# Patient Record
Sex: FEMALE | Race: WHITE | NOT HISPANIC OR LATINO | Employment: OTHER | URBAN - METROPOLITAN AREA
[De-identification: names, ages, dates, MRNs, and addresses within clinical notes are randomized per-mention and may not be internally consistent; named-entity substitution may affect disease eponyms.]

---

## 2017-05-04 LAB
ALBUMIN/CREAT UR: 17 MCG/MG CREAT
BUN SERPL-MCNC: 18 MG/DL (ref 7–25)
BUN/CREAT SERPL: ABNORMAL (CALC) (ref 6–22)
CALCIUM SERPL-MCNC: 10.1 MG/DL (ref 8.6–10.4)
CHLORIDE SERPL-SCNC: 99 MMOL/L (ref 98–110)
CO2 SERPL-SCNC: 30 MMOL/L (ref 20–31)
CREAT SERPL-MCNC: 0.86 MG/DL (ref 0.6–0.93)
CREAT UR-MCNC: 64 MG/DL (ref 20–320)
GLUCOSE SERPL-MCNC: 242 MG/DL (ref 65–99)
HBA1C MFR BLD: 8.6 % OF TOTAL HGB
MICROALBUMIN UR-MCNC: 1.1 MG/DL
POTASSIUM SERPL-SCNC: 4.7 MMOL/L (ref 3.5–5.3)
SL AMB EGFR AFRICAN AMERICAN: 74 ML/MIN/1.73M2
SL AMB EGFR NON AFRICAN AMERICAN: 64 ML/MIN/1.73M2
SODIUM SERPL-SCNC: 137 MMOL/L (ref 135–146)

## 2017-06-30 ENCOUNTER — CONVERSION ENCOUNTER (OUTPATIENT)
Dept: MAMMOGRAPHY | Facility: CLINIC | Age: 82
End: 2017-06-30

## 2018-02-02 LAB
ALBUMIN SERPL-MCNC: 4.2 G/DL (ref 3.6–5.1)
ALBUMIN/CREAT UR: 8 MCG/MG CREAT
ALBUMIN/GLOB SERPL: 1.2 (CALC) (ref 1–2.5)
ALP SERPL-CCNC: 110 U/L (ref 33–130)
ALT SERPL-CCNC: 19 U/L (ref 6–29)
AST SERPL-CCNC: 18 U/L (ref 10–35)
BILIRUB SERPL-MCNC: 0.5 MG/DL (ref 0.2–1.2)
BUN SERPL-MCNC: 20 MG/DL (ref 7–25)
BUN/CREAT SERPL: ABNORMAL (CALC) (ref 6–22)
CALCIUM SERPL-MCNC: 10.9 MG/DL (ref 8.6–10.4)
CHLORIDE SERPL-SCNC: 101 MMOL/L (ref 98–110)
CHOLEST SERPL-MCNC: 157 MG/DL
CHOLEST/HDLC SERPL: 2.5 (CALC)
CO2 SERPL-SCNC: 29 MMOL/L (ref 20–31)
CREAT SERPL-MCNC: 0.82 MG/DL (ref 0.6–0.88)
CREAT UR-MCNC: 77 MG/DL (ref 20–320)
GLOBULIN SER CALC-MCNC: 3.4 G/DL (CALC) (ref 1.9–3.7)
GLUCOSE SERPL-MCNC: 119 MG/DL (ref 65–99)
HBA1C MFR BLD: 7.8 % OF TOTAL HGB
HDLC SERPL-MCNC: 62 MG/DL
LDLC SERPL CALC-MCNC: 81 MG/DL (CALC)
MICROALBUMIN UR-MCNC: 0.6 MG/DL
NONHDLC SERPL-MCNC: 95 MG/DL (CALC)
POTASSIUM SERPL-SCNC: 3.8 MMOL/L (ref 3.5–5.3)
PROT SERPL-MCNC: 7.6 G/DL (ref 6.1–8.1)
SL AMB EGFR AFRICAN AMERICAN: 78 ML/MIN/1.73M2
SL AMB EGFR NON AFRICAN AMERICAN: 68 ML/MIN/1.73M2
SODIUM SERPL-SCNC: 141 MMOL/L (ref 135–146)
TRIGL SERPL-MCNC: 63 MG/DL

## 2018-03-09 LAB
25(OH)D3 SERPL-MCNC: 56 NG/ML (ref 30–100)
HBA1C MFR BLD: 7.5 % OF TOTAL HGB
PTH-INTACT SERPL-MCNC: NORMAL PG/ML

## 2018-03-15 LAB
CALCIUM SERPL-MCNC: 10.2 MG/DL (ref 8.6–10.4)
PTH-INTACT SERPL-MCNC: 82 PG/ML (ref 14–64)

## 2018-05-21 LAB
CALCIUM SERPL-MCNC: 9.7 MG/DL (ref 8.4–10.2)
GLUCOSE SERPL-MCNC: 179 MG/DL (ref 70–99)
GLUCOSE SERPL-MCNC: 185 MG/DL (ref 70–99)
GLUCOSE SERPL-MCNC: 230 MG/DL (ref 70–99)
GLUCOSE SERPL-MCNC: 233 MG/DL (ref 70–99)
PTH-INTACT SERPL-MCNC: 102 PG/ML (ref 16.7–78.9)
PTH-INTACT SERPL-MCNC: 40.5 PG/ML (ref 16.7–78.9)

## 2018-05-22 LAB
CALCIUM SERPL-MCNC: 9 MG/DL (ref 8.4–10.2)
GLUCOSE SERPL-MCNC: 170 MG/DL (ref 70–99)
GLUCOSE SERPL-MCNC: 206 MG/DL (ref 70–99)

## 2018-05-28 LAB
ABSOL LYMPHOCYTES (HISTORICAL): 1.9 K/UL (ref 0.5–4)
ALBUMIN SERPL BCP-MCNC: 3.8 G/DL (ref 3–5.2)
ALP SERPL-CCNC: 120 U/L (ref 43–122)
ALT SERPL W P-5'-P-CCNC: 35 U/L (ref 9–52)
ANION GAP SERPL CALCULATED.3IONS-SCNC: 12 MMOL/L (ref 5–14)
AST SERPL W P-5'-P-CCNC: 23 U/L (ref 14–36)
BASOPHILS # BLD AUTO: 0.1 K/UL (ref 0–0.1)
BASOPHILS # BLD AUTO: 1 % (ref 0–1)
BILIRUB SERPL-MCNC: 0.4 MG/DL
BUN SERPL-MCNC: 23 MG/DL (ref 5–25)
CALCIUM SERPL-MCNC: 9.4 MG/DL (ref 8.4–10.2)
CALLED AND READ BACK BY. (HISTORICAL): NORMAL
CALLED AND READ BACK BY. (HISTORICAL): NORMAL
CHLORIDE SERPL-SCNC: 101 MEQ/L (ref 97–108)
CK SERPL-CCNC: 39 U/L (ref 30–135)
CK SERPL-CCNC: 45 U/L (ref 30–135)
CO2 SERPL-SCNC: 25 MMOL/L (ref 22–30)
CREATINE KINASE-MB FRACTION (HISTORICAL): 0.9 NG/ML (ref 0–2.37)
CREATINE KINASE-MB FRACTION (HISTORICAL): 1.21 NG/ML (ref 0–2.37)
CREATINE, SERUM (HISTORICAL): 0.84 MG/DL (ref 0.6–1.2)
DEPRECATED RDW RBC AUTO: 12.8 %
EGFR (HISTORICAL): >60 ML/MIN/1.73 M2
EOSINOPHIL # BLD AUTO: 0.2 K/UL (ref 0–0.4)
EOSINOPHIL NFR BLD AUTO: 3 % (ref 0–6)
GLUCOSE SERPL-MCNC: 128 MG/DL (ref 70–99)
GLUCOSE SERPL-MCNC: 191 MG/DL (ref 70–99)
GLUCOSE SERPL-MCNC: 217 MG/DL (ref 70–99)
GLUCOSE SERPL-MCNC: 281 MG/DL (ref 70–99)
GLUCOSE SERPL-MCNC: 287 MG/DL (ref 70–99)
HCT VFR BLD AUTO: 38.3 % (ref 36–46)
HGB BLD-MCNC: 13.1 G/DL (ref 12–16)
LYMPHOCYTES NFR BLD AUTO: 21 % (ref 25–45)
MAGNESIUM SERPL-MCNC: 2 MG/DL (ref 1.6–2.3)
MCH RBC QN AUTO: 31 PG (ref 26–34)
MCHC RBC AUTO-ENTMCNC: 34.2 % (ref 31–36)
MCV RBC AUTO: 91 FL (ref 80–100)
MONOCYTES # BLD AUTO: 0.7 K/UL (ref 0.2–0.9)
MONOCYTES NFR BLD AUTO: 8 % (ref 1–10)
NEUTROPHILS ABS COUNT (HISTORICAL): 6.3 K/UL (ref 1.8–7.8)
NEUTS SEG NFR BLD AUTO: 67 % (ref 45–65)
PLATELET # BLD AUTO: 232 K/MCL (ref 150–450)
POTASSIUM SERPL-SCNC: 3.8 MEQ/L (ref 3.6–5)
RBC # BLD AUTO: 4.23 M/MCL (ref 4–5.2)
SODIUM SERPL-SCNC: 138 MEQ/L (ref 137–147)
TOTAL PROTEIN (HISTORICAL): 7.1 G/DL (ref 5.9–8.4)
TROPONIN I SERPL-MCNC: 0.08 NG/ML (ref 0–0.03)
TROPONIN I SERPL-MCNC: 0.12 NG/ML (ref 0–0.03)
TROPONIN I SERPL-MCNC: 0.16 NG/ML (ref 0–0.03)
TROPONIN I SERPL-MCNC: 0.38 NG/ML (ref 0–0.03)
TSH SERPL DL<=0.05 MIU/L-ACNC: 1.92 UIU/ML (ref 0.47–4.68)
WBC # BLD AUTO: 9.3 K/MCL (ref 4.5–11)

## 2018-05-29 LAB
ABSOL LYMPHOCYTES (HISTORICAL): 2.2 K/UL (ref 0.5–4)
ANION GAP SERPL CALCULATED.3IONS-SCNC: 7 MMOL/L (ref 5–14)
BASOPHILS # BLD AUTO: 0.1 K/UL (ref 0–0.1)
BASOPHILS # BLD AUTO: 1 % (ref 0–1)
BUN SERPL-MCNC: 19 MG/DL (ref 5–25)
CALCIUM SERPL-MCNC: 9.2 MG/DL (ref 8.4–10.2)
CHLORIDE SERPL-SCNC: 99 MEQ/L (ref 97–108)
CO2 SERPL-SCNC: 30 MMOL/L (ref 22–30)
CREATINE, SERUM (HISTORICAL): 0.87 MG/DL (ref 0.6–1.2)
DEPRECATED RDW RBC AUTO: 13.1 %
EGFR (HISTORICAL): >60 ML/MIN/1.73 M2
EOSINOPHIL # BLD AUTO: 0.3 K/UL (ref 0–0.4)
EOSINOPHIL NFR BLD AUTO: 4 % (ref 0–6)
GLUCOSE SERPL-MCNC: 103 MG/DL (ref 70–99)
GLUCOSE SERPL-MCNC: 104 MG/DL (ref 70–99)
GLUCOSE SERPL-MCNC: 113 MG/DL (ref 70–99)
GLUCOSE SERPL-MCNC: 244 MG/DL (ref 70–99)
GLUCOSE SERPL-MCNC: 270 MG/DL (ref 70–99)
HCT VFR BLD AUTO: 38.4 % (ref 36–46)
HGB BLD-MCNC: 12.8 G/DL (ref 12–16)
LYMPHOCYTES NFR BLD AUTO: 24 % (ref 25–45)
MCH RBC QN AUTO: 30.1 PG (ref 26–34)
MCHC RBC AUTO-ENTMCNC: 33.5 % (ref 31–36)
MCV RBC AUTO: 90 FL (ref 80–100)
MONOCYTES # BLD AUTO: 0.8 K/UL (ref 0.2–0.9)
MONOCYTES NFR BLD AUTO: 8 % (ref 1–10)
NEUTROPHILS ABS COUNT (HISTORICAL): 5.8 K/UL (ref 1.8–7.8)
NEUTS SEG NFR BLD AUTO: 63 % (ref 45–65)
PLATELET # BLD AUTO: 259 K/MCL (ref 150–450)
POTASSIUM SERPL-SCNC: 3.7 MEQ/L (ref 3.6–5)
RBC # BLD AUTO: 4.27 M/MCL (ref 4–5.2)
SODIUM SERPL-SCNC: 137 MEQ/L (ref 137–147)
TROPONIN I SERPL-MCNC: 0.07 NG/ML (ref 0–0.03)
WBC # BLD AUTO: 9.2 K/MCL (ref 4.5–11)

## 2018-05-30 LAB
GLUCOSE SERPL-MCNC: 242 MG/DL (ref 70–99)
GLUCOSE SERPL-MCNC: 86 MG/DL (ref 70–99)

## 2018-07-12 ENCOUNTER — TELEPHONE (OUTPATIENT)
Dept: FAMILY MEDICINE CLINIC | Facility: CLINIC | Age: 83
End: 2018-07-12

## 2018-07-12 DIAGNOSIS — E78.49 OTHER HYPERLIPIDEMIA: Primary | ICD-10-CM

## 2018-07-12 RX ORDER — SIMVASTATIN 20 MG
20 TABLET ORAL DAILY
Qty: 90 TABLET | Refills: 3 | Status: SHIPPED | OUTPATIENT
Start: 2018-07-12 | End: 2018-12-17 | Stop reason: SDUPTHER

## 2018-07-12 RX ORDER — SIMVASTATIN 20 MG
1 TABLET ORAL DAILY
COMMUNITY
Start: 2017-04-03 | End: 2018-07-12 | Stop reason: SDUPTHER

## 2018-07-12 NOTE — TELEPHONE ENCOUNTER
Pt needs a refill on her Simvastatin 20 mg 1 tablet daily sent into the pharmacy listed in chart  Pt sees Dr Juan Horne

## 2018-07-23 DIAGNOSIS — I10 HYPERTENSION, UNSPECIFIED TYPE: Primary | ICD-10-CM

## 2018-07-23 RX ORDER — DILTIAZEM HYDROCHLORIDE 120 MG/1
120 CAPSULE, COATED, EXTENDED RELEASE ORAL DAILY
Qty: 30 CAPSULE | Refills: 5 | Status: SHIPPED | OUTPATIENT
Start: 2018-07-23 | End: 2019-01-14 | Stop reason: SDUPTHER

## 2018-07-24 DIAGNOSIS — E55.9 VITAMIN D DEFICIENCY, UNSPECIFIED: Primary | ICD-10-CM

## 2018-07-24 RX ORDER — B-COMPLEX WITH VITAMIN C
1 TABLET ORAL 2 TIMES DAILY
Refills: 1 | COMMUNITY
Start: 2018-05-24 | End: 2018-07-24 | Stop reason: SDUPTHER

## 2018-07-24 RX ORDER — B-COMPLEX WITH VITAMIN C
1 TABLET ORAL
Qty: 200 TABLET | Refills: 0 | Status: SHIPPED | OUTPATIENT
Start: 2018-07-24 | End: 2018-12-17 | Stop reason: SDUPTHER

## 2018-08-15 ENCOUNTER — CLINICAL SUPPORT (OUTPATIENT)
Dept: FAMILY MEDICINE CLINIC | Facility: CLINIC | Age: 83
End: 2018-08-15
Payer: COMMERCIAL

## 2018-08-15 DIAGNOSIS — R73.09 ABNORMAL GLUCOSE: Primary | ICD-10-CM

## 2018-08-15 PROCEDURE — 36415 COLL VENOUS BLD VENIPUNCTURE: CPT

## 2018-08-16 LAB
EST. AVERAGE GLUCOSE BLD GHB EST-MCNC: 189 (CALC)
EST. AVERAGE GLUCOSE BLD GHB EST-SCNC: 10.4 (CALC)
HBA1C MFR BLD: 8.2 % OF TOTAL HGB

## 2018-08-22 ENCOUNTER — OFFICE VISIT (OUTPATIENT)
Dept: FAMILY MEDICINE CLINIC | Facility: CLINIC | Age: 83
End: 2018-08-22
Payer: COMMERCIAL

## 2018-08-22 VITALS
BODY MASS INDEX: 28.19 KG/M2 | SYSTOLIC BLOOD PRESSURE: 150 MMHG | OXYGEN SATURATION: 95 % | RESPIRATION RATE: 18 BRPM | WEIGHT: 153.2 LBS | HEART RATE: 85 BPM | DIASTOLIC BLOOD PRESSURE: 92 MMHG | TEMPERATURE: 97.2 F | HEIGHT: 62 IN

## 2018-08-22 DIAGNOSIS — E04.1 THYROID NODULE: ICD-10-CM

## 2018-08-22 DIAGNOSIS — I48.0 PAROXYSMAL ATRIAL FIBRILLATION (HCC): ICD-10-CM

## 2018-08-22 DIAGNOSIS — E11.22 CONTROLLED TYPE 2 DIABETES MELLITUS WITH STAGE 1 CHRONIC KIDNEY DISEASE, WITHOUT LONG-TERM CURRENT USE OF INSULIN (HCC): ICD-10-CM

## 2018-08-22 DIAGNOSIS — E11.9 TYPE 2 DIABETES MELLITUS WITHOUT COMPLICATION, UNSPECIFIED WHETHER LONG TERM INSULIN USE (HCC): Primary | ICD-10-CM

## 2018-08-22 DIAGNOSIS — E21.3 HYPERPARATHYROIDISM (HCC): ICD-10-CM

## 2018-08-22 DIAGNOSIS — I72.8 SPLENIC ARTERY ANEURYSM (HCC): ICD-10-CM

## 2018-08-22 DIAGNOSIS — N18.1 CONTROLLED TYPE 2 DIABETES MELLITUS WITH STAGE 1 CHRONIC KIDNEY DISEASE, WITHOUT LONG-TERM CURRENT USE OF INSULIN (HCC): ICD-10-CM

## 2018-08-22 DIAGNOSIS — E78.2 MIXED HYPERLIPIDEMIA: ICD-10-CM

## 2018-08-22 DIAGNOSIS — I10 BENIGN ESSENTIAL HYPERTENSION: Primary | ICD-10-CM

## 2018-08-22 PROBLEM — M54.17 LUMBOSACRAL RADICULOPATHY: Status: ACTIVE | Noted: 2017-04-10

## 2018-08-22 PROBLEM — I72.2 ANEURYSM OF RENAL ARTERY (HCC): Status: RESOLVED | Noted: 2017-04-17 | Resolved: 2018-08-22

## 2018-08-22 PROBLEM — I72.2 ANEURYSM OF RENAL ARTERY (HCC): Status: ACTIVE | Noted: 2017-04-17

## 2018-08-22 PROCEDURE — 99214 OFFICE O/P EST MOD 30 MIN: CPT | Performed by: FAMILY MEDICINE

## 2018-08-22 RX ORDER — UREA 10 %
1 LOTION (ML) TOPICAL DAILY
COMMUNITY
Start: 2018-05-31 | End: 2018-08-22 | Stop reason: SDUPTHER

## 2018-08-22 RX ORDER — FUROSEMIDE 20 MG/1
1 TABLET ORAL DAILY
Refills: 0 | COMMUNITY
Start: 2018-05-24 | End: 2018-12-27 | Stop reason: SDUPTHER

## 2018-08-22 RX ORDER — HYDROCHLOROTHIAZIDE 25 MG/1
0.5 TABLET ORAL DAILY
COMMUNITY
Start: 2018-02-06 | End: 2018-08-22 | Stop reason: ALTCHOICE

## 2018-08-22 RX ORDER — AMLODIPINE BESYLATE 5 MG/1
5 TABLET ORAL DAILY
COMMUNITY
End: 2018-08-22 | Stop reason: ALTCHOICE

## 2018-08-22 RX ORDER — REPAGLINIDE 0.5 MG/1
0.5 TABLET ORAL 3 TIMES DAILY
Qty: 270 TABLET | Refills: 3 | Status: SHIPPED | OUTPATIENT
Start: 2018-08-22 | End: 2018-12-17 | Stop reason: DRUGHIGH

## 2018-08-22 RX ORDER — LISINOPRIL 40 MG/1
1 TABLET ORAL DAILY
COMMUNITY
Start: 2018-02-06 | End: 2018-08-22 | Stop reason: ALTCHOICE

## 2018-08-22 RX ORDER — ATENOLOL 50 MG/1
1 TABLET ORAL EVERY 12 HOURS
COMMUNITY
Start: 2018-03-06 | End: 2018-12-17 | Stop reason: SDUPTHER

## 2018-08-22 RX ORDER — REPAGLINIDE 0.5 MG/1
1 TABLET ORAL 3 TIMES DAILY
COMMUNITY
Start: 2018-06-08 | End: 2018-08-22 | Stop reason: SDUPTHER

## 2018-08-22 RX ORDER — ELECTROLYTES/DEXTROSE
1 SOLUTION, ORAL ORAL EVERY 24 HOURS
COMMUNITY
Start: 2010-10-04 | End: 2018-09-24 | Stop reason: ALTCHOICE

## 2018-08-22 RX ORDER — DILTIAZEM HYDROCHLORIDE 120 MG/1
1 CAPSULE, COATED, EXTENDED RELEASE ORAL EVERY 24 HOURS
COMMUNITY
Start: 2018-06-22 | End: 2018-08-22 | Stop reason: SDUPTHER

## 2018-08-22 RX ORDER — CHOLECALCIFEROL (VITAMIN D3) 50 MCG
1 TABLET ORAL DAILY
COMMUNITY
Start: 2014-09-25 | End: 2019-02-08 | Stop reason: ALTCHOICE

## 2018-08-22 NOTE — ASSESSMENT & PLAN NOTE
Lab Results   Component Value Date    HGBA1C 8 2 (H) 08/15/2018   Hospital labs reviewed from 5/2018  No hypoglycemia  Had 2 hospitalizations which messed with her BS  No change in meds  We discuuseed today  She tries to eat healthy and exercises regularly  No change in meds and follow the HbA1c  Blood Sugar Average: Last 72 hrs: JML=[id number]  RADHA= 175-200

## 2018-08-22 NOTE — ASSESSMENT & PLAN NOTE
Has been in NSR since the hospitalization  Has had no change in meds  No palpitations  Goes to exercise 2-3x/wk and does resistence and Cardio for an hour  Reviewed labs from 5/23/18  No change in meds

## 2018-08-22 NOTE — PROGRESS NOTES
Assessment/Plan:    Diabetes mellitus type 2, controlled ([facility] )  Lab Results   Component Value Date    HGBA1C 8 2 (H) 08/15/2018   Hospital labs reviewed from 5/2018  No hypoglycemia  Had 2 hospitalizations which messed with her BS  No change in meds  We discuuseed today  She tries to eat healthy and exercises regularly  No change in meds and follow the HbA1c  Blood Sugar Average: Last 72 hrs: ISQ=[id number]  RADHA= 175-200  Paroxysmal atrial fibrillation (HCC)  Has been in NSR since the hospitalization  Has had no change in meds  No palpitations  Goes to exercise 2-3x/wk and does resistence and Cardio for an hour  Reviewed labs from 5/23/18  No change in meds  Benign essential hypertension  HBPM - SBP= 120s and 130s and the DBP = 70s  Exercises regularly and I reviewed labs from 5/2018  Mixed hyperlipidemia  Doing well on simvistatin  No change in present meds  Hyperparathyroidism ([place] [address] )  On calcium replacement once a day, post parathroid surgery for removal of active adenoma  Diagnoses and all orders for this visit:    Benign essential hypertension    Paroxysmal atrial fibrillation ([place] [address] )    Controlled type 2 diabetes mellitus with stage 1 chronic kidney disease, without long-term current use of insulin ([facility])  -     Hemoglobin A1C; Future  -     Basic metabolic panel; Future  -     Lipid Panel with Direct LDL reflex; Future    Hyperparathyroidism ([place] [address] )    Splenic artery aneurysm (HCC)    Mixed hyperlipidemia    Thyroid nodule  -     US head neck soft tissue; Future    Other orders  -     Discontinue: amLODIPine (NORVASC) 5 mg tablet; Take 5 mg by mouth daily  -     Discontinue: aspirin 81 MG tablet; Take 1 tablet by mouth daily  -     atenolol (TENORMIN) 50 mg tablet; Take 1 tablet by mouth Every 12 hours  -     Insulin Pen Needle (BD ULTRA-FINE MICRO PEN NEEDLE) 32G X 6 MM MISC; daily  -     Discontinue: calcium carbonate (OS-AMBER) 1250 (500 Ca) MG chewable tablet;  Chew 1 tablet daily  - Cholecalciferol (VITAMIN D) 2000 units tablet; Take 1 tablet by mouth daily  -     Discontinue: diltiazem (DILTIAZEM CD) 120 mg 24 hr capsule; Take 1 tablet by mouth every 24 hours  -     furosemide (LASIX) 20 mg tablet; Take 1 tablet by mouth daily  -     Discontinue: hydrochlorothiazide (HYDRODIURIL) 25 mg tablet; Take 0 5 tablets by mouth daily  -     insulin detemir (LEVEMIR FLEXTOUCH) 100 Units/mL injection pen; Inject 12 Units under the skin daily  -     Discontinue: lisinopril (ZESTRIL) 40 mg tablet; Take 1 tablet by mouth daily  -     Multiple Vitamins-Minerals (MULTIVITAMIN ADULT) TABS; Take 1 tablet by mouth every 24 hours  -     repaglinide (PRANDIN) 0 5 mg tablet; Take 1 tablet by mouth 3 (three) times a day  -     rivaroxaban (XARELTO) 20 mg tablet; Take 1 tablet by mouth every 24 hours          Subjective:     Chief Complaint   Patient presents with    Hypertension        Patient ID: Nba Duvall is a 82 y o  female  HPI    The following portions of the patient's history were reviewed and updated as appropriate: allergies, current medications, past family history, past medical history, past social history, past surgical history and problem list     Review of Systems   Constitutional: Negative for activity change, appetite change, fatigue, fever and unexpected weight change  HENT: Negative for congestion, dental problem and sneezing  Eyes: Negative for discharge and visual disturbance  Respiratory: Negative for cough and wheezing  Gastrointestinal: Negative for abdominal pain, constipation, diarrhea, nausea and vomiting  Endocrine: Negative for polydipsia and polyuria  Genitourinary: Negative for dysuria and frequency  Musculoskeletal: Negative for arthralgias  Skin: Negative for rash  Allergic/Immunologic: Negative for environmental allergies and food allergies  Neurological: Negative for headaches  Hematological: Negative for adenopathy     Psychiatric/Behavioral: Negative for behavioral problems and sleep disturbance  Objective:  Vitals:    08/22/18 1306 08/22/18 1335   BP: (!) 198/102 150/92   BP Location: Left arm    Patient Position: Sitting    Cuff Size: Standard    Pulse: 85    Resp: 18    Temp: (!) 97 2 °F (36 2 °C)    TempSrc: Temporal    SpO2: 95%    Weight: 69 5 kg (153 lb 3 2 oz)    Height: 5' 2" (1 575 m)       Physical Exam   Constitutional: She is oriented to person, place, and time  She appears well-developed and well-nourished  HENT:   Head: Normocephalic  Right Ear: External ear normal    Left Ear: External ear normal    Nose: Nose normal    Eyes: Conjunctivae are normal  Pupils are equal, round, and reactive to light  Right eye exhibits no discharge  Left eye exhibits no discharge  Neck: Normal range of motion  Neck supple  No thyromegaly present  Cardiovascular: Normal rate, regular rhythm and normal heart sounds  No murmur heard  Pulmonary/Chest: Effort normal and breath sounds normal    Abdominal: Soft  Bowel sounds are normal  There is no tenderness  Musculoskeletal: Normal range of motion  Lymphadenopathy:     She has no cervical adenopathy  Neurological: She is alert and oriented to person, place, and time  Skin: Skin is warm  No rash noted  Psychiatric: She has a normal mood and affect   Her behavior is normal

## 2018-08-27 ENCOUNTER — TRANSCRIBE ORDERS (OUTPATIENT)
Dept: ADMINISTRATIVE | Facility: HOSPITAL | Age: 83
End: 2018-08-27

## 2018-08-27 DIAGNOSIS — Z12.39 SCREENING BREAST EXAMINATION: Primary | ICD-10-CM

## 2018-09-12 ENCOUNTER — TELEPHONE (OUTPATIENT)
Dept: FAMILY MEDICINE CLINIC | Facility: CLINIC | Age: 83
End: 2018-09-12

## 2018-09-12 DIAGNOSIS — Z12.31 ENCOUNTER FOR SCREENING MAMMOGRAM FOR BREAST CANCER: Primary | ICD-10-CM

## 2018-09-18 ENCOUNTER — HOSPITAL ENCOUNTER (OUTPATIENT)
Dept: MAMMOGRAPHY | Facility: CLINIC | Age: 83
Discharge: HOME | End: 2018-09-18
Payer: COMMERCIAL

## 2018-09-18 DIAGNOSIS — Z12.39 SCREENING BREAST EXAMINATION: ICD-10-CM

## 2018-09-18 PROCEDURE — 77067 SCR MAMMO BI INCL CAD: CPT

## 2018-09-24 ENCOUNTER — OFFICE VISIT (OUTPATIENT)
Dept: CARDIOLOGY CLINIC | Facility: CLINIC | Age: 83
End: 2018-09-24
Payer: COMMERCIAL

## 2018-09-24 VITALS
BODY MASS INDEX: 28.01 KG/M2 | SYSTOLIC BLOOD PRESSURE: 146 MMHG | DIASTOLIC BLOOD PRESSURE: 90 MMHG | OXYGEN SATURATION: 95 % | HEART RATE: 63 BPM | HEIGHT: 62 IN | WEIGHT: 152.2 LBS

## 2018-09-24 DIAGNOSIS — E78.2 MIXED HYPERLIPIDEMIA: ICD-10-CM

## 2018-09-24 DIAGNOSIS — I48.0 PAROXYSMAL ATRIAL FIBRILLATION (HCC): ICD-10-CM

## 2018-09-24 DIAGNOSIS — I10 BENIGN ESSENTIAL HYPERTENSION: Primary | ICD-10-CM

## 2018-09-24 PROCEDURE — 99214 OFFICE O/P EST MOD 30 MIN: CPT | Performed by: INTERNAL MEDICINE

## 2018-09-24 PROCEDURE — 93000 ELECTROCARDIOGRAM COMPLETE: CPT | Performed by: INTERNAL MEDICINE

## 2018-09-24 NOTE — PROGRESS NOTES
Cardiology Follow Up    Adrienne Valencia  1935  [id number]  [address] [facility] [facility]  [address] [address] [phone]    1  Benign essential hypertension  POCT ECG   2  Paroxysmal atrial fibrillation (HCC)     3  Mixed hyperlipidemia         Patient was 1st seen by us for new onset atrial fibrillation with RVR postop parathyroidectomy by Dr Santi Houston  She is a 2nd admission for the same and at that time had a type 2 non ST elevation myocardial infarction  CT scan PE study was negative  Past medical history includes hypertension, hyperlipidemia and diabetes mellitus  05/29/2018 stress test: Negative Persantine stress test  LVEF 77%, hyperdynamic ventricle  Normal tomographic perfusion series  05/23/2018 echocardiogram: Normal left ventricular systolic function, EF 81-53%  Intermediate left ventricular diastolic function  Normal left ventricular filling pressures  Mild-to-moderate left atrial enlargement  Aortic sclerosis without stenosis  05/24/2018 FLP: Cholesterol 128, triglycerides 52, HDL 51, LDL calculated 59  Interval History:   Patient with a history of paroxysmal atrial fibrillation returns  She denies palpitations or being aware of an irregular heartbeat  Her EKG demonstrates sinus rhythm  She denies chest discomfort or shortness of breath      Patient Active Problem List   Diagnosis    Benign essential hypertension    Female bladder prolapse    Fibrocystic breast disease    Hypercalcemia    Incontinence    Kyphosis    Microproteinuria    Lumbosacral radiculopathy    Ventricular premature depolarization    Tinnitus    Paroxysmal atrial fibrillation (HCC)    Mixed hyperlipidemia    Diabetes mellitus type 2, controlled ([address] )    Hyperparathyroidism ([address] )    Splenic artery aneurysm (HCC)    Thyroid nodule     Past Medical History:   Diagnosis Date    Anemia IRON DEF ANEMIA DUE TO MENORRHAGIA    Hypercalcemia 10/06/2010    MILD  DECREASES HCTZ    Melanoma ([address] ) 11/2011    IN SITU  MID BACK    Palpitations 05/28/2018    SINUS TACHYCARDIA  TROPONIN SPLL    Skin cancer 2017    NOSE     Social History     Social History    Marital status: /Civil Union     Spouse name: N/A    Number of children: N/A    Years of education: N/A     Occupational History    Not on file       Social History Main Topics    Smoking status: Never Smoker    Smokeless tobacco: Never Used    Alcohol use No    Drug use: No    Sexual activity: Not on file     Other Topics Concern    Not on file     Social History Narrative    No narrative on file      Family History   Problem Relation Age of Onset    Other Mother         TESTED(-)    Colon cancer Father     Prostate cancer Father     Factor V Leiden deficiency Daughter     Factor V Leiden deficiency Daughter      Past Surgical History:   Procedure Laterality Date    HYSTERECTOMY  1995    UTERINE PROLAPSE VAGINAL POLYPS    HYSTERECTOMY      IRON REPLACEMENT    OTHER SURGICAL HISTORY  11/2011    EXCISION MELANOMA IN SITU MID BACK    PARATHYROIDECTOMY  05/21/2018       Current Outpatient Prescriptions:     atenolol (TENORMIN) 50 mg tablet, Take 1 tablet by mouth Every 12 hours, Disp: , Rfl:     calcium carbonate-vitamin D (OSCAL-D) 500 mg-200 units per tablet, Take 1 tablet by mouth daily with breakfast, Disp: 200 tablet, Rfl: 0    Cholecalciferol (VITAMIN D) 2000 units tablet, Take 1 tablet by mouth daily, Disp: , Rfl:     diltiazem (CARDIZEM CD) 120 mg 24 hr capsule, Take 1 capsule (120 mg total) by mouth daily, Disp: 30 capsule, Rfl: 5    furosemide (LASIX) 20 mg tablet, Take 1 tablet by mouth daily, Disp: , Rfl: 0    insulin detemir (LEVEMIR FLEXTOUCH) 100 Units/mL injection pen, Inject 12 Units under the skin daily, Disp: , Rfl:     repaglinide (PRANDIN) 0 5 mg tablet, Take 1 tablet (0 5 mg total) by mouth 3 (three) times a day, Disp: 270 tablet, Rfl: 3    rivaroxaban (XARELTO) 20 mg tablet, Take 1 tablet by mouth every 24 hours, Disp: , Rfl:     simvastatin (ZOCOR) 20 mg tablet, Take 1 tablet (20 mg total) by mouth daily, Disp: 90 tablet, Rfl: 3  Allergies   Allergen Reactions    No Active Allergies        Results for orders placed or performed in visit on 09/24/18   POCT ECG    Narrative    Normal sinus rhythm at a rate of 63 beats per minute  First degree AV block  Lab Results   Component Value Date    CHOL 128 05/24/2018     Lab Results   Component Value Date    HDL 59 05/24/2018    HDL 62 02/01/2018     Lab Results   Component Value Date    LDLCALC 59 05/24/2018     Lab Results   Component Value Date    TRIG 52 05/24/2018    TRIG 63 02/01/2018     No components found for: CHOLHDL  Lab Results   Component Value Date    GLUCOSE 104 (H) 05/29/2018    CALCIUM 9 2 05/29/2018     05/29/2018    K 3 7 05/29/2018    CO2 30 05/29/2018    CL 99 05/29/2018    BUN 19 05/29/2018    CREATININE 0 82 02/01/2018     Lab Results   Component Value Date     05/29/2018    K 3 7 05/29/2018    CL 99 05/29/2018    CO2 30 05/29/2018    ANIONGAP 7 05/29/2018    BUN 19 05/29/2018    CREATININE 0 82 02/01/2018    GLUCOSE 104 (H) 05/29/2018    GLUF 91 05/24/2018    CALCIUM 9 2 05/29/2018    AST 23 05/28/2018    ALT 35 05/28/2018    ALKPHOS 120 05/28/2018    PROT 7 1 05/28/2018    BILITOT 0 4 05/28/2018     Lab Results   Component Value Date    WBC 9 2 05/29/2018    HGB 12 8 05/29/2018    HCT 38 4 05/29/2018    MCV 90 05/29/2018     05/29/2018     Lab Results   Component Value Date    PRH2QLZGEWNI 1 920 05/28/2018     Review of Systems:  Review of Systems   Constitutional: Negative  HENT: Negative  Respiratory: Negative for chest tightness and shortness of breath  Cardiovascular: Negative for chest pain and leg swelling  Gastrointestinal: Negative  Endocrine: Negative  Genitourinary: Negative  Musculoskeletal: Negative  Skin: Negative  Allergic/Immunologic: Negative  Neurological: Negative  Hematological: Negative  Psychiatric/Behavioral: Negative  Physical Exam:  /90 (BP Location: Left arm, Patient Position: Sitting, Cuff Size: Large)   Pulse 63   Ht 5' 2" (1 575 m)   Wt 69 kg (152 lb 3 2 oz)   SpO2 95%   BMI 27 84 kg/m²   Physical Exam   Constitutional: She is oriented to person, place, and time  She appears well-developed and well-nourished  HENT:   Head: Normocephalic and atraumatic  Neck: Normal range of motion  Neck supple  No JVD present  No tracheal deviation present  No thyromegaly present  Cardiovascular: Normal rate, regular rhythm, normal heart sounds and intact distal pulses  Exam reveals no gallop and no friction rub  No murmur heard  Pulmonary/Chest: Effort normal  No respiratory distress  She has no rales  Abdominal: Soft  Bowel sounds are normal    Musculoskeletal: Normal range of motion  She exhibits no edema  Neurological: She is alert and oriented to person, place, and time  Skin: Skin is warm and dry  Psychiatric: She has a normal mood and affect  Her behavior is normal        Discussion/Summary:  1  Paroxysmal atrial fibrillation now in sinus rhythm  2    Return in 6 months

## 2018-10-04 ENCOUNTER — HOSPITAL ENCOUNTER (OUTPATIENT)
Dept: ULTRASOUND IMAGING | Facility: HOSPITAL | Age: 83
Discharge: HOME | End: 2018-10-04
Payer: COMMERCIAL

## 2018-10-04 DIAGNOSIS — E04.1 THYROID NODULE: ICD-10-CM

## 2018-10-04 PROCEDURE — 76536 US EXAM OF HEAD AND NECK: CPT

## 2018-10-15 ENCOUNTER — CLINICAL SUPPORT (OUTPATIENT)
Dept: FAMILY MEDICINE CLINIC | Facility: CLINIC | Age: 83
End: 2018-10-15
Payer: COMMERCIAL

## 2018-10-15 DIAGNOSIS — Z23 NEED FOR INFLUENZA VACCINATION: Primary | ICD-10-CM

## 2018-10-15 PROCEDURE — G0008 ADMIN INFLUENZA VIRUS VAC: HCPCS

## 2018-10-15 PROCEDURE — 90662 IIV NO PRSV INCREASED AG IM: CPT

## 2018-10-29 ENCOUNTER — OFFICE VISIT (OUTPATIENT)
Dept: VASCULAR SURGERY | Facility: CLINIC | Age: 83
End: 2018-10-29
Payer: COMMERCIAL

## 2018-10-29 VITALS
HEIGHT: 64 IN | BODY MASS INDEX: 25.1 KG/M2 | HEART RATE: 76 BPM | SYSTOLIC BLOOD PRESSURE: 162 MMHG | RESPIRATION RATE: 16 BRPM | DIASTOLIC BLOOD PRESSURE: 90 MMHG | WEIGHT: 147 LBS

## 2018-10-29 DIAGNOSIS — I72.8 SPLENIC ARTERY ANEURYSM (HCC): Primary | ICD-10-CM

## 2018-10-29 PROCEDURE — 4040F PNEUMOC VAC/ADMIN/RCVD: CPT | Performed by: SURGERY

## 2018-10-29 PROCEDURE — 99203 OFFICE O/P NEW LOW 30 MIN: CPT | Performed by: SURGERY

## 2018-10-29 NOTE — PATIENT INSTRUCTIONS
1) Splenic artery aneurysm  -you have an aneurysm (enlargement of the artery) going to the spleen which is about 13mm in size  -we would only consider treating that surgically if it reached >20mm in size  -we will continue to monitor this with yearly ultrasounds; the first one will be in 6 months

## 2018-10-29 NOTE — PROGRESS NOTES
Assessment/Plan:    Pt is an 83 yo F w/ DM, hyperparathyroidism (s/p resection), HTN, afib (on Xarelto), back pain, incontinence, tinnitus, HLD, presents to discuss incidental finding of splenic artery aneurysm    Splenic artery aneurysm (HCC)  -     Ambulatory referral to Vascular Surgery  -     VAS celiac and/or mesenteric duplex; complete study; Future  -reviewed CT chest w/ contrast which had incidental finding of splenic artery aneurysm; appears to be ~13mm by my read; done at [facility] and can't see formal read or indications for study (pt is unsure); very tortuous splenic artery but does appear to have single inflow and single outflow vessel; heavy calcific ring around aneurysm suggests chronic nature  -no fam hx of aneurysm; no smoking hx  -discussed pathophysiology of splenic aneurysm disease and indications for treatment including symptomatic or size >2cm; no indication for surgery at this time  -will continue surveillance with yearly DU; will get baseline exam in 6 mos; will also evaluate for AAA at that time (not included on chest CT)  -f/u 1 year    Medications  -cont statin  -not taking ASA but is on Xarelto for afib    Subjective:      Patient ID: Jahaira Brito is a 82 y o  female  Patient is new to our practice referred by Dr Rupali Perdomo)  Patient is asymptomatic  She has a history of HTN and diabetes  She takes Zocor and Xarelto  HPI:    Pt presents to discuss incidental finding of splenic artery aneurysm on chest CT  Patient reports getting chest CT in relation to her parathyroidectomy procedure  She denies abdominal or back pain    She denies family or personal hx of aneurysm    Denies any current or prior smoking history          The following portions of the patient's history were reviewed and updated as appropriate: allergies, current medications, past family history, past medical history, past social history, past surgical history and problem list     Review of Systems Constitutional: Negative for chills and fever  HENT: Negative for postnasal drip, sinus pressure and sore throat  Eyes: Positive for pain and itching  Respiratory: Negative for shortness of breath  Cardiovascular: Negative for chest pain  Irregular heart rate   Gastrointestinal: Negative  Negative for abdominal distention and abdominal pain  Endocrine: Positive for cold intolerance  Genitourinary: Negative  Musculoskeletal: Negative  Negative for back pain  Skin: Negative  Negative for wound  Allergic/Immunologic: Negative  Neurological: Negative  Hematological: Negative  Psychiatric/Behavioral: Positive for sleep disturbance  The patient is nervous/anxious  Objective:      /90 (BP Location: Right arm, Patient Position: Sitting)   Pulse 76   Resp 16   Ht 5' 3 5" (1 613 m)   Wt 66 7 kg (147 lb)   BMI 25 63 kg/m²          Physical Exam   Constitutional: She is oriented to person, place, and time  She appears well-developed and well-nourished  HENT:   Head: Normocephalic and atraumatic  Eyes: Conjunctivae are normal    Neck: Normal range of motion  Neck supple  Cardiovascular: Normal rate, regular rhythm and normal heart sounds  No murmur heard  Pulses:       Radial pulses are 2+ on the right side, and 2+ on the left side  Femoral pulses are 2+ on the right side, and 2+ on the left side  Popliteal pulses are 2+ on the right side, and 2+ on the left side  Dorsalis pedis pulses are 1+ on the right side, and 1+ on the left side  Posterior tibial pulses are 2+ on the right side, and 2+ on the left side  No carotid bruits B   Pulmonary/Chest: Effort normal and breath sounds normal  No respiratory distress  She has no wheezes  Abdominal: Soft  She exhibits mass (no large pulsatile mass; can palpate aorta)  She exhibits no distension  There is no tenderness  There is no rebound  Musculoskeletal: Normal range of motion   She exhibits no edema  Neurological: She is alert and oriented to person, place, and time  Skin: Skin is warm and dry  Psychiatric: She has a normal mood and affect  Her behavior is normal    Nursing note and vitals reviewed  Vitals:    10/29/18 1422   BP: 162/90   BP Location: Right arm   Patient Position: Sitting   Pulse: 76   Resp: 16   Weight: 66 7 kg (147 lb)   Height: 5' 3 5" (1 613 m)       Patient Active Problem List   Diagnosis    Benign essential hypertension    Female bladder prolapse    Fibrocystic breast disease    Hypercalcemia    Incontinence    Kyphosis    Microproteinuria    Lumbosacral radiculopathy    Ventricular premature depolarization    Tinnitus    Paroxysmal atrial fibrillation (HCC)    Mixed hyperlipidemia    Diabetes mellitus type 2, controlled (HCC)    Hyperparathyroidism ([address]    Splenic artery aneurysm (HCC)    Thyroid nodule       Past Surgical History:   Procedure Laterality Date    GALLBLADDER SURGERY  1991    HYSTERECTOMY  1995    UTERINE PROLAPSE VAGINAL POLYPS    HYSTERECTOMY      IRON REPLACEMENT    OTHER SURGICAL HISTORY  11/2011    EXCISION MELANOMA IN SITU MID BACK    PARATHYROIDECTOMY  05/21/2018       Family History   Problem Relation Age of Onset    Other Mother         TESTED(-)    Colon cancer Father     Prostate cancer Father     Factor V Leiden deficiency Daughter     Factor V Leiden deficiency Daughter        Social History     Social History    Marital status: /Civil Union     Spouse name: N/A    Number of children: N/A    Years of education: N/A     Occupational History    Not on file       Social History Main Topics    Smoking status: Never Smoker    Smokeless tobacco: Never Used    Alcohol use No    Drug use: No    Sexual activity: Not on file     Other Topics Concern    Not on file     Social History Narrative    No narrative on file       Allergies   Allergen Reactions    No Active Allergies          Current Outpatient Prescriptions:     atenolol (TENORMIN) 50 mg tablet, Take 1 tablet by mouth Every 12 hours, Disp: , Rfl:     calcium carbonate-vitamin D (OSCAL-D) 500 mg-200 units per tablet, Take 1 tablet by mouth daily with breakfast, Disp: 200 tablet, Rfl: 0    Cholecalciferol (VITAMIN D) 2000 units tablet, Take 1 tablet by mouth daily, Disp: , Rfl:     diltiazem (CARDIZEM CD) 120 mg 24 hr capsule, Take 1 capsule (120 mg total) by mouth daily, Disp: 30 capsule, Rfl: 5    furosemide (LASIX) 20 mg tablet, Take 1 tablet by mouth daily, Disp: , Rfl: 0    insulin detemir (LEVEMIR FLEXTOUCH) 100 Units/mL injection pen, Inject 12 Units under the skin daily, Disp: , Rfl:     repaglinide (PRANDIN) 0 5 mg tablet, Take 1 tablet (0 5 mg total) by mouth 3 (three) times a day, Disp: 270 tablet, Rfl: 3    rivaroxaban (XARELTO) 20 mg tablet, Take 1 tablet by mouth every 24 hours, Disp: , Rfl:     simvastatin (ZOCOR) 20 mg tablet, Take 1 tablet (20 mg total) by mouth daily, Disp: 90 tablet, Rfl: 3

## 2018-12-03 ENCOUNTER — CLINICAL SUPPORT (OUTPATIENT)
Dept: FAMILY MEDICINE CLINIC | Facility: CLINIC | Age: 83
End: 2018-12-03

## 2018-12-03 DIAGNOSIS — E11.9 TYPE 2 DIABETES MELLITUS WITHOUT COMPLICATION, WITHOUT LONG-TERM CURRENT USE OF INSULIN (HCC): ICD-10-CM

## 2018-12-03 DIAGNOSIS — E78.49 OTHER HYPERLIPIDEMIA: ICD-10-CM

## 2018-12-03 DIAGNOSIS — I10 ESSENTIAL HYPERTENSION: Primary | ICD-10-CM

## 2018-12-04 LAB
BUN SERPL-MCNC: 17 MG/DL (ref 7–25)
BUN/CREAT SERPL: ABNORMAL (CALC) (ref 6–22)
CALCIUM SERPL-MCNC: 9.4 MG/DL (ref 8.6–10.4)
CHLORIDE SERPL-SCNC: 101 MMOL/L (ref 98–110)
CHOLEST SERPL-MCNC: 143 MG/DL
CHOLEST/HDLC SERPL: 2.2 (CALC)
CO2 SERPL-SCNC: 29 MMOL/L (ref 20–32)
CREAT SERPL-MCNC: 0.82 MG/DL (ref 0.6–0.88)
GLUCOSE SERPL-MCNC: 123 MG/DL (ref 65–99)
HBA1C MFR BLD: 8.6 % OF TOTAL HGB
HDLC SERPL-MCNC: 65 MG/DL
LDLC SERPL CALC-MCNC: 65 MG/DL (CALC)
NONHDLC SERPL-MCNC: 78 MG/DL (CALC)
POTASSIUM SERPL-SCNC: 4 MMOL/L (ref 3.5–5.3)
SL AMB EGFR AFRICAN AMERICAN: 78 ML/MIN/1.73M2
SL AMB EGFR NON AFRICAN AMERICAN: 67 ML/MIN/1.73M2
SODIUM SERPL-SCNC: 141 MMOL/L (ref 135–146)
TRIGL SERPL-MCNC: 51 MG/DL

## 2018-12-17 ENCOUNTER — OFFICE VISIT (OUTPATIENT)
Dept: FAMILY MEDICINE CLINIC | Facility: CLINIC | Age: 83
End: 2018-12-17
Payer: COMMERCIAL

## 2018-12-17 VITALS
BODY MASS INDEX: 27.07 KG/M2 | TEMPERATURE: 97.5 F | HEART RATE: 79 BPM | DIASTOLIC BLOOD PRESSURE: 88 MMHG | HEIGHT: 63 IN | SYSTOLIC BLOOD PRESSURE: 136 MMHG | RESPIRATION RATE: 18 BRPM | OXYGEN SATURATION: 97 % | WEIGHT: 152.8 LBS

## 2018-12-17 DIAGNOSIS — E11.22 CONTROLLED TYPE 2 DIABETES MELLITUS WITH STAGE 1 CHRONIC KIDNEY DISEASE, WITHOUT LONG-TERM CURRENT USE OF INSULIN (HCC): ICD-10-CM

## 2018-12-17 DIAGNOSIS — N18.1 CONTROLLED TYPE 2 DIABETES MELLITUS WITH STAGE 1 CHRONIC KIDNEY DISEASE, WITHOUT LONG-TERM CURRENT USE OF INSULIN (HCC): ICD-10-CM

## 2018-12-17 DIAGNOSIS — E55.9 VITAMIN D DEFICIENCY: ICD-10-CM

## 2018-12-17 DIAGNOSIS — E83.52 HYPERCALCEMIA: ICD-10-CM

## 2018-12-17 DIAGNOSIS — I10 ESSENTIAL HYPERTENSION: Primary | ICD-10-CM

## 2018-12-17 DIAGNOSIS — I10 BENIGN ESSENTIAL HYPERTENSION: ICD-10-CM

## 2018-12-17 DIAGNOSIS — E11.9 TYPE 2 DIABETES MELLITUS WITHOUT COMPLICATION, UNSPECIFIED WHETHER LONG TERM INSULIN USE (HCC): ICD-10-CM

## 2018-12-17 DIAGNOSIS — E55.9 VITAMIN D DEFICIENCY, UNSPECIFIED: ICD-10-CM

## 2018-12-17 DIAGNOSIS — E78.2 MIXED HYPERLIPIDEMIA: ICD-10-CM

## 2018-12-17 DIAGNOSIS — I48.0 PAROXYSMAL ATRIAL FIBRILLATION (HCC): ICD-10-CM

## 2018-12-17 DIAGNOSIS — E78.49 OTHER HYPERLIPIDEMIA: ICD-10-CM

## 2018-12-17 PROBLEM — E21.3 HYPERPARATHYROIDISM (HCC): Status: RESOLVED | Noted: 2018-08-22 | Resolved: 2018-12-17

## 2018-12-17 PROCEDURE — 3075F SYST BP GE 130 - 139MM HG: CPT | Performed by: FAMILY MEDICINE

## 2018-12-17 PROCEDURE — 1036F TOBACCO NON-USER: CPT | Performed by: FAMILY MEDICINE

## 2018-12-17 PROCEDURE — 1160F RVW MEDS BY RX/DR IN RCRD: CPT | Performed by: FAMILY MEDICINE

## 2018-12-17 PROCEDURE — 99214 OFFICE O/P EST MOD 30 MIN: CPT | Performed by: FAMILY MEDICINE

## 2018-12-17 PROCEDURE — 3008F BODY MASS INDEX DOCD: CPT | Performed by: FAMILY MEDICINE

## 2018-12-17 PROCEDURE — 3079F DIAST BP 80-89 MM HG: CPT | Performed by: FAMILY MEDICINE

## 2018-12-17 RX ORDER — SIMVASTATIN 20 MG
20 TABLET ORAL DAILY
Qty: 90 TABLET | Refills: 3 | Status: SHIPPED | OUTPATIENT
Start: 2018-12-17 | End: 2019-01-16 | Stop reason: SDUPTHER

## 2018-12-17 RX ORDER — ATENOLOL 50 MG/1
50 TABLET ORAL DAILY
Qty: 90 TABLET | Refills: 3 | Status: SHIPPED | OUTPATIENT
Start: 2018-12-17 | End: 2018-12-17 | Stop reason: SDUPTHER

## 2018-12-17 RX ORDER — REPAGLINIDE 0.5 MG/1
0.5 TABLET ORAL 3 TIMES DAILY
Qty: 270 TABLET | Refills: 3 | Status: CANCELLED | OUTPATIENT
Start: 2018-12-17

## 2018-12-17 RX ORDER — PEN NEEDLE, DIABETIC 32GX 5/32"
NEEDLE, DISPOSABLE MISCELLANEOUS DAILY
Refills: 2 | COMMUNITY
Start: 2018-11-12 | End: 2019-01-16 | Stop reason: SDUPTHER

## 2018-12-17 RX ORDER — REPAGLINIDE 1 MG/1
1 TABLET ORAL
Qty: 270 TABLET | Refills: 3 | Status: SHIPPED | OUTPATIENT
Start: 2018-12-17 | End: 2019-02-08 | Stop reason: SINTOL

## 2018-12-17 RX ORDER — ATENOLOL 50 MG/1
50 TABLET ORAL DAILY
Qty: 90 TABLET | Refills: 3 | Status: SHIPPED | OUTPATIENT
Start: 2018-12-17 | End: 2019-01-16 | Stop reason: DRUGHIGH

## 2018-12-17 RX ORDER — REPAGLINIDE 1 MG/1
1 TABLET ORAL
Qty: 270 TABLET | Refills: 3 | Status: SHIPPED | OUTPATIENT
Start: 2018-12-17 | End: 2018-12-17 | Stop reason: SDUPTHER

## 2018-12-17 RX ORDER — B-COMPLEX WITH VITAMIN C
1 TABLET ORAL
Qty: 200 TABLET | Refills: 3 | Status: SHIPPED | OUTPATIENT
Start: 2018-12-17 | End: 2019-01-16 | Stop reason: SDUPTHER

## 2018-12-17 NOTE — ASSESSMENT & PLAN NOTE
BMP = WNL  Avoids salt  BMP=WNL  Patient's blood pressure is controlled  Patient denies any side effects with medications  Patient educated on the importance of weight loss, and appropriate dieting  Patient admits to be compliant with medications  No change in meds

## 2018-12-17 NOTE — PROGRESS NOTES
Patient's shoes and socks removed  Right Foot/Ankle   Right Foot Inspection  Skin Exam: skin normal and skin intact no dry skin, no warmth, no callus, no erythema, no maceration, no abnormal color, no pre-ulcer, no ulcer and no callus                              Vascular    The right DP pulse is 2+  The right PT pulse is 2+  Left Foot/Ankle  Left Foot Inspection  Skin Exam: skin normal and skin intactno dry skin, no warmth, no erythema, no maceration, normal color, no pre-ulcer, no ulcer and no callus                                           Vascular    The left DP pulse is 2+  The left PT pulse is 2+  Assign Risk Category:  No deformity present; No loss of protective sensation;  No weak pulses       Risk: 0

## 2018-12-17 NOTE — ASSESSMENT & PLAN NOTE
Lab Results   Component Value Date    HGBA1C 8 6 (H) 12/03/2018   HbA1c is increased from 8 2% in 8/2018 and 7 5% in 3/2018  Eats out 3x/wk, on average and that makes it difficult to control her calories and CHO  On Levemir at 12 units SQ daily  On the repaglinide but sometimes forgets one dose  Blood Sugar Average: Last 72 hrs: FBS = range of 125-155  Noon = 170-280  0xp=076-292  RADHA = 235-290

## 2018-12-17 NOTE — ASSESSMENT & PLAN NOTE
1 5 cm seen on CTA of 5/28/18 - had been admitted for A-Fib and this was a spurious finding  Saw the vascular specialist on 10/29/2018  The splenic artery aneurysm is 13 mm and any concerns would start at 20 mm  Patient will have a repeat ultrasound in 6 months and will be followed by vascular

## 2018-12-17 NOTE — ASSESSMENT & PLAN NOTE
Followed by Tonya Ho  Seen every 6 months  No change in meds  No recent echocardiogram    In May of 2018 she had a parathyroidectomy and the day after went to the emergency room with atrial fibrillation  As far as we know she has had no recurrence of atrial fibrillation since that time

## 2018-12-17 NOTE — PROGRESS NOTES
Assessment/Plan:    Paroxysmal atrial fibrillation ([facility] [address] )  Followed by [facility] - Dr Cris Meyers  Seen every 6 months  No change in meds  No recent echocardiogram    In May of 2018 she had a parathyroidectomy and the day after went to the emergency room with atrial fibrillation  As far as we know she has had no recurrence of atrial fibrillation since that time  Diabetes mellitus type 2, controlled ([facility] [address] )  Lab Results   Component Value Date    HGBA1C 8 6 (H) 12/03/2018   HbA1c is increased from 8 2% in 8/2018 and 7 5% in 3/2018  Eats out 3x/wk, on average and that makes it difficult to control her calories and CHO  On Levemir at 12 units SQ daily  On the repaglinide but sometimes forgets one dose  Blood Sugar Average: Last 72 hrs: FBS = range of 125-155  Noon = 170-280  [id number]  RADHA = 235-290  Hypercalcemia  Calcium = 9 2 (WNL) since the parathyroidectomy  Benign essential hypertension  BMP = WNL  Avoids salt  BMP=WNL  Patient's blood pressure is controlled  Patient denies any side effects with medications  Patient educated on the importance of weight loss, and appropriate dieting  Patient admits to be compliant with medications  No change in meds  Vitamin D deficiency  Stopping the vitamin D3 supplement because she is taking calcium and vitamin D tablet  Splenic artery aneurysm (HCC)  1 5 cm seen on CTA of 5/28/18 - had been admitted for A-Fib and this was a spurious finding  Saw the vascular specialist on 10/29/2018  The splenic artery aneurysm is 13 mm and any concerns would start at 20 mm  Patient will have a repeat ultrasound in 6 months and will be followed by vascular  Diagnoses and all orders for this visit:    Essential hypertension  -     atenolol (TENORMIN) 50 mg tablet;  Take 1 tablet (50 mg total) by mouth daily    Type 2 diabetes mellitus without complication, unspecified whether long term insulin use (HCC)  -     insulin detemir (LEVEMIR FLEXTOUCH) 100 Units/mL injection pen; Inject 12 Units under the skin daily  -     Glucose Blood (ROBBIE BREEZE 2 TEST) DISK; 1 each by In Vitro route daily  -     repaglinide (PRANDIN) 1 mg tablet; Take 1 tablet (1 mg total) by mouth 3 (three) times a day before meals    Mixed hyperlipidemia    Paroxysmal atrial fibrillation (HCC)    Hypercalcemia    Benign essential hypertension    Controlled type 2 diabetes mellitus with stage 1 chronic kidney disease, without long-term current use of insulin (HCC)  -     Hemoglobin A1C; Future  -     Comprehensive metabolic panel; Future  -     Microalbumin / creatinine urine ratio    Vitamin D deficiency  -     Vitamin D 25 hydroxy; Future    Other orders  -     BD PEN NEEDLE RIZWAN U/F 32G X 4 MM MISC; daily  -     Cancel: repaglinide (PRANDIN) 0 5 mg tablet; Take 1 tablet (0 5 mg total) by mouth 3 (three) times a day          Subjective:     Chief Complaint   Patient presents with    Diabetes    Hypertension    Hyperlipidemia        Patient ID: Imelda Mcelroy is a 82 y o  female  HPI    The following portions of the patient's history were reviewed and updated as appropriate: allergies, current medications, past family history, past medical history, past social history, past surgical history and problem list     Review of Systems   Constitutional: Negative for activity change, appetite change, fatigue, fever and unexpected weight change  HENT: Negative for congestion, dental problem and sneezing  Eyes: Negative for discharge and visual disturbance  Respiratory: Negative for cough, chest tightness, shortness of breath and wheezing  Cardiovascular: Negative for chest pain, palpitations and leg swelling  Gastrointestinal: Negative for abdominal pain, constipation, diarrhea, nausea and vomiting  Endocrine: Negative for polydipsia and polyuria  Genitourinary: Negative for dysuria and frequency  Musculoskeletal: Negative for arthralgias  Skin: Negative for rash  Allergic/Immunologic: Negative for environmental allergies and food allergies  Neurological: Negative for headaches  Hematological: Negative for adenopathy  Psychiatric/Behavioral: Negative for behavioral problems and sleep disturbance  Objective:  Vitals:    12/17/18 1356   BP: 136/88   BP Location: Left arm   Patient Position: Sitting   Cuff Size: Standard   Pulse: 79   Resp: 18   Temp: 97 5 °F (36 4 °C)   TempSrc: Temporal   SpO2: 97%   Weight: 69 3 kg (152 lb 12 8 oz)   Height: 5' 3" (1 6 m)      Physical Exam   Constitutional: She is oriented to person, place, and time  She appears well-developed and well-nourished  HENT:   Head: Normocephalic  Right Ear: External ear normal    Left Ear: External ear normal    Nose: Nose normal    Eyes: Pupils are equal, round, and reactive to light  Conjunctivae are normal  Right eye exhibits no discharge  Left eye exhibits no discharge  Neck: Normal range of motion  Neck supple  No thyromegaly present  Cardiovascular: Normal rate, regular rhythm and normal heart sounds  No murmur heard  Pulmonary/Chest: Effort normal and breath sounds normal    Abdominal: Soft  Bowel sounds are normal  There is no tenderness  Musculoskeletal: Normal range of motion  Lymphadenopathy:     She has no cervical adenopathy  Neurological: She is alert and oriented to person, place, and time  Skin: Skin is warm  No rash noted  Psychiatric: She has a normal mood and affect   Her behavior is normal  Judgment and thought content normal

## 2018-12-26 DIAGNOSIS — I48.91 ATRIAL FIBRILLATION, UNSPECIFIED TYPE (HCC): Primary | ICD-10-CM

## 2018-12-26 NOTE — TELEPHONE ENCOUNTER
Patient is a [facility] patient, Sending to Dr Brinda Ivan due to Dr Kay Martin being out of the office

## 2018-12-27 RX ORDER — FUROSEMIDE 20 MG/1
20 TABLET ORAL DAILY
Qty: 90 TABLET | Refills: 5 | Status: SHIPPED | OUTPATIENT
Start: 2018-12-27 | End: 2019-03-27

## 2019-01-07 DIAGNOSIS — E11.9 TYPE 2 DIABETES MELLITUS WITHOUT COMPLICATION, UNSPECIFIED WHETHER LONG TERM INSULIN USE (HCC): ICD-10-CM

## 2019-01-14 DIAGNOSIS — I48.91 ATRIAL FIBRILLATION, UNSPECIFIED TYPE (HCC): ICD-10-CM

## 2019-01-14 DIAGNOSIS — I10 HYPERTENSION, UNSPECIFIED TYPE: Primary | ICD-10-CM

## 2019-01-14 RX ORDER — DILTIAZEM HYDROCHLORIDE 120 MG/1
120 CAPSULE, COATED, EXTENDED RELEASE ORAL DAILY
Qty: 90 CAPSULE | Refills: 3 | Status: SHIPPED | OUTPATIENT
Start: 2019-01-14 | End: 2019-06-24 | Stop reason: SDUPTHER

## 2019-01-16 ENCOUNTER — OFFICE VISIT (OUTPATIENT)
Dept: FAMILY MEDICINE CLINIC | Facility: CLINIC | Age: 84
End: 2019-01-16
Payer: COMMERCIAL

## 2019-01-16 VITALS
RESPIRATION RATE: 18 BRPM | SYSTOLIC BLOOD PRESSURE: 160 MMHG | BODY MASS INDEX: 27.04 KG/M2 | WEIGHT: 152.6 LBS | HEART RATE: 79 BPM | OXYGEN SATURATION: 97 % | DIASTOLIC BLOOD PRESSURE: 84 MMHG | TEMPERATURE: 97.7 F | HEIGHT: 63 IN

## 2019-01-16 DIAGNOSIS — R42 VERTIGO: ICD-10-CM

## 2019-01-16 DIAGNOSIS — E11.9 CONTROLLED TYPE 2 DIABETES MELLITUS WITHOUT COMPLICATION, WITH LONG-TERM CURRENT USE OF INSULIN (HCC): ICD-10-CM

## 2019-01-16 DIAGNOSIS — Z79.4 CONTROLLED TYPE 2 DIABETES MELLITUS WITHOUT COMPLICATION, WITH LONG-TERM CURRENT USE OF INSULIN (HCC): ICD-10-CM

## 2019-01-16 DIAGNOSIS — E55.9 VITAMIN D DEFICIENCY, UNSPECIFIED: ICD-10-CM

## 2019-01-16 DIAGNOSIS — E11.9 TYPE 2 DIABETES MELLITUS WITHOUT COMPLICATION, UNSPECIFIED WHETHER LONG TERM INSULIN USE (HCC): Primary | ICD-10-CM

## 2019-01-16 DIAGNOSIS — E78.49 OTHER HYPERLIPIDEMIA: ICD-10-CM

## 2019-01-16 DIAGNOSIS — I10 BENIGN ESSENTIAL HYPERTENSION: ICD-10-CM

## 2019-01-16 LAB
ALBUMIN SERPL BCP-MCNC: 3.8 G/DL (ref 3.5–5)
ALP SERPL-CCNC: 105 U/L (ref 46–116)
ALT SERPL W P-5'-P-CCNC: 25 U/L (ref 12–78)
ANION GAP SERPL CALCULATED.3IONS-SCNC: 6 MMOL/L (ref 4–13)
AST SERPL W P-5'-P-CCNC: 19 U/L (ref 5–45)
BASOPHILS # BLD AUTO: 0.05 THOUSANDS/ΜL (ref 0–0.1)
BASOPHILS NFR BLD AUTO: 1 % (ref 0–1)
BILIRUB SERPL-MCNC: 0.41 MG/DL (ref 0.2–1)
BUN SERPL-MCNC: 24 MG/DL (ref 5–25)
CALCIUM SERPL-MCNC: 9.7 MG/DL (ref 8.3–10.1)
CHLORIDE SERPL-SCNC: 102 MMOL/L (ref 100–108)
CO2 SERPL-SCNC: 30 MMOL/L (ref 21–32)
CREAT SERPL-MCNC: 0.92 MG/DL (ref 0.6–1.3)
EOSINOPHIL # BLD AUTO: 0.06 THOUSAND/ΜL (ref 0–0.61)
EOSINOPHIL NFR BLD AUTO: 1 % (ref 0–6)
ERYTHROCYTE [DISTWIDTH] IN BLOOD BY AUTOMATED COUNT: 12.8 % (ref 11.6–15.1)
GFR SERPL CREATININE-BSD FRML MDRD: 59 ML/MIN/1.73SQ M
GLUCOSE SERPL-MCNC: 261 MG/DL (ref 65–140)
HCT VFR BLD AUTO: 42.6 % (ref 34.8–46.1)
HGB BLD-MCNC: 13.6 G/DL (ref 11.5–15.4)
IMM GRANULOCYTES # BLD AUTO: 0.04 THOUSAND/UL (ref 0–0.2)
IMM GRANULOCYTES NFR BLD AUTO: 0 % (ref 0–2)
LYMPHOCYTES # BLD AUTO: 1.33 THOUSANDS/ΜL (ref 0.6–4.47)
LYMPHOCYTES NFR BLD AUTO: 12 % (ref 14–44)
MAGNESIUM SERPL-MCNC: 2.3 MG/DL (ref 1.6–2.6)
MCH RBC QN AUTO: 29.8 PG (ref 26.8–34.3)
MCHC RBC AUTO-ENTMCNC: 31.9 G/DL (ref 31.4–37.4)
MCV RBC AUTO: 93 FL (ref 82–98)
MONOCYTES # BLD AUTO: 0.5 THOUSAND/ΜL (ref 0.17–1.22)
MONOCYTES NFR BLD AUTO: 5 % (ref 4–12)
NEUTROPHILS # BLD AUTO: 8.92 THOUSANDS/ΜL (ref 1.85–7.62)
NEUTS SEG NFR BLD AUTO: 81 % (ref 43–75)
NRBC BLD AUTO-RTO: 0 /100 WBCS
PLATELET # BLD AUTO: 244 THOUSANDS/UL (ref 149–390)
PMV BLD AUTO: 12.2 FL (ref 8.9–12.7)
POTASSIUM SERPL-SCNC: 3.8 MMOL/L (ref 3.5–5.3)
PROT SERPL-MCNC: 8 G/DL (ref 6.4–8.2)
RBC # BLD AUTO: 4.56 MILLION/UL (ref 3.81–5.12)
SODIUM SERPL-SCNC: 138 MMOL/L (ref 136–145)
TSH SERPL DL<=0.05 MIU/L-ACNC: 1.61 UIU/ML (ref 0.36–3.74)
WBC # BLD AUTO: 10.9 THOUSAND/UL (ref 4.31–10.16)

## 2019-01-16 PROCEDURE — 80053 COMPREHEN METABOLIC PANEL: CPT | Performed by: FAMILY MEDICINE

## 2019-01-16 PROCEDURE — 1101F PT FALLS ASSESS-DOCD LE1/YR: CPT | Performed by: FAMILY MEDICINE

## 2019-01-16 PROCEDURE — 99214 OFFICE O/P EST MOD 30 MIN: CPT | Performed by: FAMILY MEDICINE

## 2019-01-16 PROCEDURE — 3725F SCREEN DEPRESSION PERFORMED: CPT | Performed by: FAMILY MEDICINE

## 2019-01-16 PROCEDURE — 83735 ASSAY OF MAGNESIUM: CPT | Performed by: FAMILY MEDICINE

## 2019-01-16 PROCEDURE — 84443 ASSAY THYROID STIM HORMONE: CPT | Performed by: FAMILY MEDICINE

## 2019-01-16 PROCEDURE — 36415 COLL VENOUS BLD VENIPUNCTURE: CPT | Performed by: FAMILY MEDICINE

## 2019-01-16 PROCEDURE — 85025 COMPLETE CBC W/AUTO DIFF WBC: CPT | Performed by: FAMILY MEDICINE

## 2019-01-16 RX ORDER — B-COMPLEX WITH VITAMIN C
1 TABLET ORAL
Qty: 200 TABLET | Refills: 3 | Status: SHIPPED | OUTPATIENT
Start: 2019-01-16 | End: 2020-09-14 | Stop reason: ALTCHOICE

## 2019-01-16 RX ORDER — ATENOLOL 50 MG/1
50 TABLET ORAL 2 TIMES DAILY
Qty: 180 TABLET | Refills: 3 | Status: SHIPPED | OUTPATIENT
Start: 2019-01-16 | End: 2019-06-24 | Stop reason: SDUPTHER

## 2019-01-16 RX ORDER — PEN NEEDLE, DIABETIC 32GX 5/32"
NEEDLE, DISPOSABLE MISCELLANEOUS DAILY
Qty: 100 EACH | Refills: 3 | Status: SHIPPED | OUTPATIENT
Start: 2019-01-16 | End: 2020-03-30 | Stop reason: SDUPTHER

## 2019-01-16 RX ORDER — ATENOLOL 50 MG/1
50 TABLET ORAL 2 TIMES DAILY
COMMUNITY
End: 2019-01-16 | Stop reason: SDUPTHER

## 2019-01-16 RX ORDER — SIMVASTATIN 20 MG
20 TABLET ORAL DAILY
Qty: 90 TABLET | Refills: 3 | Status: SHIPPED | OUTPATIENT
Start: 2019-01-16 | End: 2019-06-24 | Stop reason: SDUPTHER

## 2019-01-16 RX ORDER — LANCETS
EACH MISCELLANEOUS DAILY
Qty: 50 EACH | Refills: 3 | Status: SHIPPED | OUTPATIENT
Start: 2019-01-16

## 2019-01-16 NOTE — PATIENT INSTRUCTIONS
It is quite possible that these episodes at nighttime are related to hypoglycemia  Her tilt test today is negative and she never has these episodes during the daytime even when she is exercising at the gym  We will check labs today  Will decrease the Levemir insulin to 8 units subcu daily  Other meds will stay the same including the repaglinide  I want her to continue home blood sugar checks but at nighttime when she has these episodes I want her to check her blood sugar and see if she is hypoglycemic  Her blood pressure is mildly elevated today but she is anxious  I will keep the meds the same but I want her to make sure she continues to avoid salt  I will see her back again in 2 weeks-she is to call me sooner if her symptoms worsen or change

## 2019-01-16 NOTE — ASSESSMENT & PLAN NOTE
Lab Results   Component Value Date    HGBA1C 8 6 (H) 12/03/2018     Taking 12 units of Levemir insulin per day, as well as the repaglinide TID,ac   Blood Sugar Average: Last 72 hrs: Over the past week, FBS = 110-130

## 2019-01-16 NOTE — PROGRESS NOTES
Assessment/Plan:    Vertigo  Started 3 weeks ago  Only occurs at night when getting OOB to go to [place], after having been asleep for 3-4 hrs  Feeling of being pushed to one side  No lightheadedness  Happens most nights  When she gets up the next time, happens 50% of the time  No headaches, Does not occur during the day, including when supine on the couch for short periods  No falls  The feeling lasts about a minute or less and then passes  Only med change was the increase in repaglinide at the last visit  Started using vinegar in Green Tea, at night, 10 days ago  Since then her FBS have improved to 110-130  No recorded hypoglycemia at other times of the day  No dizziness or vertigo when she does her Cardio exercises at the GYM, 2x/week  And in fact, none during the day, at all  Keeps well hydrated  Controlled type 2 diabetes mellitus without complication, with long-term current use of insulin ([facility])  Lab Results   Component Value Date    HGBA1C 8 6 (H) 12/03/2018     Taking 12 units of Levemir insulin per day, as well as the repaglinide TID,ac   Blood Sugar Average: Last 72 hrs: Over the past week, FBS = 110-130  Benign essential hypertension  BP yesterday, at GYN was 142/90  AVOID THESE HIGH SALT FOODS:    SNACKS THAT TASTE SALTY: PRETZELS/CHIPSPOPCORN  CANNED SOUPS AND VEGGIES  FROZEN FOODS/ MICROWAVEABLE FOODS/ CANNED FOODS    AVOID ADDING EXTRA SALT TO THE MEALS        Diagnoses and all orders for this visit:    Type 2 diabetes mellitus without complication, unspecified whether long term insulin use (HCC)  -     glucose blood (ACCU-CHEK SMARTVIEW) test strip;  Test blood sugar once a day  -     MICROLET LANCETS MISC; by Does not apply route daily  -     Comprehensive metabolic panel    Controlled type 2 diabetes mellitus without complication, with long-term current use of insulin (HCC)    Vertigo  -     CBC and differential  -     TSH, 3rd generation with Free T4 reflex  -     Magnesium    Benign essential hypertension    Other orders  -     atenolol (TENORMIN) 50 mg tablet; Take 50 mg by mouth 2 (two) times a day          Subjective:     Chief Complaint   Patient presents with    Dizziness        Patient ID: Madhavi Hines is a 82 y o  female  HPI  : episodic nocturnal imbalance  The following portions of the patient's history were reviewed and updated as appropriate: allergies, current medications, past family history, past medical history, past social history, past surgical history and problem list     Review of Systems   Constitutional: Negative for activity change, appetite change, fatigue, fever and unexpected weight change  HENT: Negative for congestion, dental problem and sneezing  Eyes: Negative for discharge and visual disturbance  Respiratory: Negative for cough, chest tightness, shortness of breath and wheezing  Cardiovascular: Negative for chest pain, palpitations and leg swelling  Gastrointestinal: Negative for abdominal pain, constipation, diarrhea, nausea and vomiting  Endocrine: Negative for polydipsia and polyuria  Genitourinary: Negative for dysuria and frequency  Musculoskeletal: Negative for arthralgias  Episodic imbalance  See HPI  Skin: Negative for rash  Allergic/Immunologic: Negative for environmental allergies and food allergies  Neurological: Negative for headaches  Hematological: Negative for adenopathy  Psychiatric/Behavioral: Negative for behavioral problems and sleep disturbance           Objective:  Vitals:    01/16/19 0856 01/16/19 0930 01/16/19 0931 01/16/19 0932   BP: (!) 174/94 168/84 170/90 160/84   BP Location: Left arm Left arm Left arm Left arm   Patient Position: Sitting Supine Sitting Standing   Cuff Size: Standard      Pulse: 90 84 88 79   Resp: 18      Temp: 97 7 °F (36 5 °C)      TempSrc: Temporal      SpO2: 97%      Weight: 69 2 kg (152 lb 9 6 oz)      Height: 5' 3" (1 6 m)         Physical Exam   Constitutional: She is oriented to person, place, and time  She appears well-developed and well-nourished  HENT:   Head: Normocephalic  Right Ear: External ear normal    Left Ear: External ear normal    Nose: Nose normal    Eyes: Pupils are equal, round, and reactive to light  Conjunctivae are normal  Right eye exhibits no discharge  Left eye exhibits no discharge  Neck: Normal range of motion  Neck supple  No thyromegaly present  Cardiovascular: Normal rate, regular rhythm and normal heart sounds  No murmur heard  Pulmonary/Chest: Effort normal and breath sounds normal    Abdominal: Soft  Bowel sounds are normal  There is no tenderness  Musculoskeletal: Normal range of motion  Lymphadenopathy:     She has no cervical adenopathy  Neurological: She is alert and oriented to person, place, and time  Skin: Skin is warm  No rash noted  Psychiatric: She has a normal mood and affect  Her behavior is normal  Judgment and thought content normal        Patient Instructions   It is quite possible that these episodes at nighttime are related to hypoglycemia  Her tilt test today is negative and she never has these episodes during the daytime even when she is exercising at the gym  We will check labs today  Will decrease the Levemir insulin to 8 units subcu daily  Other meds will stay the same including the repaglinide  I want her to continue home blood sugar checks but at nighttime when she has these episodes I want her to check her blood sugar and see if she is hypoglycemic  Her blood pressure is mildly elevated today but she is anxious  I will keep the meds the same but I want her to make sure she continues to avoid salt  I will see her back again in 2 weeks-she is to call me sooner if her symptoms worsen or change

## 2019-01-16 NOTE — ASSESSMENT & PLAN NOTE
Started 3 weeks ago  Only occurs at night when getting OOB to go to [place], after having been asleep for 3-4 hrs  Feeling of being pushed to one side  No lightheadedness  Happens most nights  When she gets up the next time, happens 50% of the time  No headaches, Does not occur during the day, including when supine on the couch for short periods  No falls  The feeling lasts about a minute or less and then passes  Only med change was the increase in repaglinide at the last visit  Started using vinegar in Green Tea, at night, 10 days ago  Since then her FBS have improved to 110-130  No recorded hypoglycemia at other times of the day  No dizziness or vertigo when she does her Cardio exercises at the GYM, 2x/week  And in fact, none during the day, at all  Keeps well hydrated

## 2019-01-16 NOTE — ASSESSMENT & PLAN NOTE
BP yesterday, at GYN was 142/90    AVOID THESE HIGH SALT FOODS:    SNACKS THAT TASTE SALTY: PRETZELS/CHIPSPOPCORN  CANNED SOUPS AND VEGGIES  FROZEN FOODS/ MICROWAVEABLE FOODS/ CANNED FOODS    AVOID ADDING EXTRA SALT TO THE MEALS

## 2019-02-08 ENCOUNTER — OFFICE VISIT (OUTPATIENT)
Dept: FAMILY MEDICINE CLINIC | Facility: CLINIC | Age: 84
End: 2019-02-08
Payer: COMMERCIAL

## 2019-02-08 VITALS
HEIGHT: 63 IN | DIASTOLIC BLOOD PRESSURE: 90 MMHG | TEMPERATURE: 98.4 F | HEART RATE: 68 BPM | OXYGEN SATURATION: 96 % | BODY MASS INDEX: 27 KG/M2 | RESPIRATION RATE: 18 BRPM | WEIGHT: 152.4 LBS | SYSTOLIC BLOOD PRESSURE: 166 MMHG

## 2019-02-08 DIAGNOSIS — I10 BENIGN ESSENTIAL HYPERTENSION: ICD-10-CM

## 2019-02-08 DIAGNOSIS — E11.9 CONTROLLED TYPE 2 DIABETES MELLITUS WITHOUT COMPLICATION, WITH LONG-TERM CURRENT USE OF INSULIN (HCC): Primary | ICD-10-CM

## 2019-02-08 DIAGNOSIS — R42 VERTIGO: ICD-10-CM

## 2019-02-08 DIAGNOSIS — Z79.4 CONTROLLED TYPE 2 DIABETES MELLITUS WITHOUT COMPLICATION, WITH LONG-TERM CURRENT USE OF INSULIN (HCC): Primary | ICD-10-CM

## 2019-02-08 PROCEDURE — 99214 OFFICE O/P EST MOD 30 MIN: CPT | Performed by: FAMILY MEDICINE

## 2019-02-08 RX ORDER — REPAGLINIDE 0.5 MG/1
0.5 TABLET ORAL
Qty: 270 TABLET | Refills: 3 | Status: SHIPPED | OUTPATIENT
Start: 2019-02-08 | End: 2019-06-24 | Stop reason: SDUPTHER
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
## 2019-02-08 NOTE — ASSESSMENT & PLAN NOTE
Lab Results   Component Value Date    HGBA1C 8 6 (H) 12/03/2018   Levemir decreased to 8 units per day, at the last visit on 1/16/19  Blood Sugar Average: Last 72 hrs: FBS = 135-170  1600 = 170-185 and RADHA = 220  No hypoglycemia  Feeling of severe vertigo in the middle of the night is gone  still gets a feeling of movement for a short period of time when she sits or when she is supine  She says this never happened until we increased her repaglinide  Decrease repaglinide to 0 5 mg p o  T i d , at the beginning of each meal   Increase Levemir to 12 units subcu daily in the evening  Continue home blood sugar checks and will go over these when I see her back in 4 weeks

## 2019-02-08 NOTE — ASSESSMENT & PLAN NOTE
The sensation of being "pulled to one side" when she gets OOB at night, has gone away since decreasing the Levemir  Denies HA or dizziness  However, gets a slight feeling of movement w/o true vertigo while sitting or supine, day or night  It is not postural  Has chronic tinnitus which is not new or worsening  She does not remember experiencing this before the dosage of repaglinide was increased 6-8 weeks ago  Does not experience dizziness or a feeling of movement when walking or exercising  Note that I decreased the repaglinide back to 0 5 mg t i d  She will call me if her symptoms worsen or change but I will see her back in 4 weeks

## 2019-02-08 NOTE — PATIENT INSTRUCTIONS
AVOID THESE HIGH SALT FOODS:    SNACKS THAT TASTE SALTY: PRETZELS/CHIPSPOPCORN  CANNED SOUPS AND VEGGIES  FROZEN FOODS/ MICROWAVEABLE FOODS/ CANNED FOODS  AVOID ADDING EXTRA SALT TO THE MEALS     A decrease repaglinide to 0 5 mg 3 times a day with each meal   Increase Levemir to 12 units subcu once a day in the evening  Continue checking home blood sugars and also we need to check home blood pressures to make sure of good control
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
## 2019-03-05 ENCOUNTER — OFFICE VISIT (OUTPATIENT)
Dept: FAMILY MEDICINE CLINIC | Facility: CLINIC | Age: 84
End: 2019-03-05
Payer: COMMERCIAL

## 2019-03-05 VITALS
RESPIRATION RATE: 18 BRPM | HEART RATE: 74 BPM | TEMPERATURE: 98.4 F | SYSTOLIC BLOOD PRESSURE: 142 MMHG | DIASTOLIC BLOOD PRESSURE: 96 MMHG | BODY MASS INDEX: 27.53 KG/M2 | OXYGEN SATURATION: 96 % | WEIGHT: 155.4 LBS

## 2019-03-05 DIAGNOSIS — I10 BENIGN ESSENTIAL HYPERTENSION: ICD-10-CM

## 2019-03-05 DIAGNOSIS — Z79.4 CONTROLLED TYPE 2 DIABETES MELLITUS WITHOUT COMPLICATION, WITH LONG-TERM CURRENT USE OF INSULIN (HCC): ICD-10-CM

## 2019-03-05 DIAGNOSIS — E11.9 CONTROLLED TYPE 2 DIABETES MELLITUS WITHOUT COMPLICATION, WITH LONG-TERM CURRENT USE OF INSULIN (HCC): ICD-10-CM

## 2019-03-05 DIAGNOSIS — R42 VERTIGO: Primary | ICD-10-CM

## 2019-03-05 PROCEDURE — 1160F RVW MEDS BY RX/DR IN RCRD: CPT | Performed by: FAMILY MEDICINE

## 2019-03-05 PROCEDURE — 99214 OFFICE O/P EST MOD 30 MIN: CPT | Performed by: FAMILY MEDICINE

## 2019-03-05 PROCEDURE — 1036F TOBACCO NON-USER: CPT | Performed by: FAMILY MEDICINE

## 2019-03-05 RX ORDER — CHOLECALCIFEROL (VITAMIN D3) 50 MCG
1 TABLET ORAL EVERY 24 HOURS
COMMUNITY
Start: 2018-06-21 | End: 2019-03-05 | Stop reason: ALTCHOICE

## 2019-03-05 RX ORDER — HYDROCHLOROTHIAZIDE 25 MG/1
12.5 TABLET ORAL DAILY
COMMUNITY
End: 2019-03-05 | Stop reason: ALTCHOICE

## 2019-03-05 RX ORDER — DILTIAZEM HYDROCHLORIDE 120 MG/1
1 CAPSULE, COATED, EXTENDED RELEASE ORAL EVERY 24 HOURS
COMMUNITY
Start: 2018-06-22 | End: 2019-03-05 | Stop reason: SDUPTHER

## 2019-03-05 RX ORDER — AMLODIPINE BESYLATE 5 MG/1
5 TABLET ORAL DAILY
COMMUNITY
End: 2019-03-27 | Stop reason: ALTCHOICE

## 2019-03-05 RX ORDER — ELECTROLYTES/DEXTROSE
1 SOLUTION, ORAL ORAL DAILY
COMMUNITY
End: 2019-03-05 | Stop reason: ALTCHOICE

## 2019-03-05 RX ORDER — LISINOPRIL 40 MG/1
40 TABLET ORAL DAILY
COMMUNITY
End: 2019-03-05 | Stop reason: ALTCHOICE

## 2019-03-05 NOTE — PROGRESS NOTES
Assessment/Plan:    Vertigo  Vertigo disappeared when the repaglinide dose was decreased to 0 5 mg  The vertigo has not reappeared  Controlled type 2 diabetes mellitus without complication, with long-term current use of insulin ([facility])  Lab Results   Component Value Date    HGBA1C 8 6 (H) 12/03/2018     Repaglinide is back to 0 5mg at each meal  Other meds stay the same  Levemir is at 12 units once a day  No hypoglycemia  Blood Sugar Average: Last 72 hrs: FBS = 130-160  1700 = 160-215 and RADHA = 210  Increase Levemir to 14 units once a day  Keep the repaglinide the same  Benign essential hypertension  HBPM: SBP = 130s and 140s  The DBP = 70s  Avoids salt  Blood pressure is controlled on present treatment regimen  Patient misses no doses and tolerates the meds without side effects  Patient avoids salt  Discussed diet, exercise and weight control  No change in present meds  Continue HBPM        Diagnoses and all orders for this visit:    Vertigo    Controlled type 2 diabetes mellitus without complication, with long-term current use of insulin ([facility])    Benign essential hypertension          Subjective:     Chief Complaint   Patient presents with    Follow-up     vertigo        Patient ID: Jordy Bustos is a 82 y o  female  HPI : Follow-up for vertigo  The following portions of the patient's history were reviewed and updated as appropriate: allergies, current medications, past family history, past medical history, past social history, past surgical history and problem list     Review of Systems   Constitutional: Negative for activity change, appetite change, fatigue and fever  HENT: Negative for congestion, ear pain, postnasal drip, rhinorrhea, sore throat and trouble swallowing  Eyes: Negative for pain, redness and visual disturbance  Respiratory: Negative for cough, chest tightness and shortness of breath  Cardiovascular: Negative for chest pain, palpitations and leg swelling  Gastrointestinal: Positive for vomiting  Negative for abdominal pain, blood in stool, constipation, diarrhea, nausea and rectal pain  Endocrine: Negative for cold intolerance, heat intolerance, polydipsia and polyphagia  Genitourinary: Negative for difficulty urinating, dysuria, frequency and hematuria  Musculoskeletal: Negative for arthralgias, back pain, gait problem, joint swelling and neck pain  Skin: Negative for rash  Neurological: Negative for dizziness, tremors, weakness, light-headedness and headaches  Hematological: Does not bruise/bleed easily  Psychiatric/Behavioral: Negative for agitation, behavioral problems and dysphoric mood  The patient is not nervous/anxious  Objective:  Vitals:    03/05/19 1032   BP: 142/96   BP Location: Left arm   Patient Position: Sitting   Cuff Size: Standard   Pulse: 74   Resp: 18   Temp: 98 4 °F (36 9 °C)   TempSrc: Temporal   SpO2: 96%   Weight: 70 5 kg (155 lb 6 4 oz)      Physical Exam   Constitutional: She is oriented to person, place, and time  She appears well-developed and well-nourished  HENT:   Left Ear: External ear normal    Nose: Nose normal    Eyes: Pupils are equal, round, and reactive to light  Conjunctivae are normal  Right eye exhibits no discharge  Left eye exhibits no discharge  Neck: Normal range of motion  Neck supple  No thyromegaly present  Cardiovascular: Normal rate, regular rhythm and normal heart sounds  No murmur heard  Pulmonary/Chest: Effort normal and breath sounds normal    Abdominal: Soft  Bowel sounds are normal  There is no tenderness  Musculoskeletal: Normal range of motion  Lymphadenopathy:     She has no cervical adenopathy  Neurological: She is alert and oriented to person, place, and time  Skin: Skin is warm  No rash noted  Psychiatric: She has a normal mood and affect   Her behavior is normal        Patient Instructions   Continue same medications except increase the Levemir to 14 units subcutaneously once a day  The repaglinide will stay at 0 5 mg orally 3 times a day at each meal   Continue diet and exercise program   Continue to minimize carbs by keeping the total carbohydrate level per day to less than 150 g per day  Refer to the diet she for carbohydrate counting that is below  Continue home blood pressure checks and continue home blood sugar checks  Has an appointment with me in June with labs prior  Basic Carbohydrate Counting   WHAT YOU NEED TO KNOW:   Carbohydrate counting is a way to plan your meals by counting the amount of carbohydrate in foods  Carbohydrates are the sugars, starches, and fiber found in fruit, grains, vegetables, and milk products  Carbohydrates increase your blood sugar levels  Carbohydrate counting can help you eat the right amount of carbohydrate to keep your blood sugar levels under control  DISCHARGE INSTRUCTIONS:   What you need to know about planning meals using carbohydrate counting:  · A dietitian or healthcare provider will help you develop a healthy meal plan that works best for you  You will be taught how much carbohydrate to eat or drink for each meal and snack  Your meal plan will be based on your age, weight, usual food intake, and physical activity level  If you have diabetes, it will also include your blood sugar levels and diabetes medicine  Once you know how much carbohydrate you should eat, you can decide what type of food you want to eat  · You will need to know what foods contain carbohydrate and how much they contain  Keep track of the amount of carbohydrate in meals and snacks in order to follow your meal plan  Do not avoid carbohydrates or skip meals  Your blood sugar may fall too low if you do not eat enough carbohydrate or you skip meals  Foods that contain carbohydrate:   · Breads:  Each serving of food listed below contains about 15 g of carbohydrate       ¨ 1 slice of bread (1 ounce) or 1 flour or corn tortilla (6 inch)    ¨ ½ of a hamburger bun or ¼ of a large bagel (about 1 ounce)    ¨ 1 pancake (about 4 inches across and ¼ inch thick)    · Cereals and grains:  Serving sizes of ready-to-eat cereals vary  Look at the serving size and the total carbohydrate amount listed on the food label  Each serving of food listed below contains about 15 g of carbohydrate   ¨ ¾ cup of dry, unsweetened, ready-to-eat cereal or ¼ cup of low-fat granola     ¨ ½ cup of oatmeal or other cooked cereal     ¨ ? cup of cooked rice or pasta    · Starchy vegetables and beans:  Each serving of food listed below contains about 15 g of carbohydrate   ¨ ½ cup of corn, green peas, sweet potatoes, or mashed potatoes    ¨ ¼ of a large baked potato    ¨ ½ cup of beans, lentils, and peas (garbanzo, parry, kidney, white, split, black-eyed)    · Crackers and snacks:  Each serving of food listed below contains about 15 g of carbohydrate   ¨ 3 fartun cracker squares or 8 animal crackers     ¨ 6 saltine-type crackers    ¨ 3 cups of popcorn or ¾ ounce of pretzels, potato chips, or tortilla chips    · Fruit:  Each serving of food listed below contains about 15 g of carbohydrate   ¨ 1 small (4 ounce) piece of fresh fruit or ¾ to 1 cup of fresh fruit    ¨ ½ cup of canned or frozen fruit, packed in natural juice    ¨ ½ cup (4 ounces) of unsweetened fruit juice    ¨ 2 tablespoons of dried fruit    · Desserts or sugary foods:  Each serving of food listed below contains about 15 g of carbohydrate   ¨ 2-inch square unfrosted cake or brownie     ¨ 2 small cookies    ¨ ½ cup of ice cream, frozen yogurt, or nondairy frozen yogurt    ¨ ¼ cup of sherbet or sorbet    ¨ 1 tablespoon of regular syrup, jam, or jelly    ¨ 2 tablespoons of light syrup    · Milk and yogurt:  Foods from the milk group contain about 12 g of carbohydrate per serving      ¨ 1 cup of fat-free or low-fat milk    ¨ 1 cup of soy milk    ¨ ? cup of fat-free, yogurt sweetened with artificial sweetener    · Non-starchy vegetables:  Each serving contains about 5 g of carbohydrate   Three servings of non-starch vegetables count as 1 carbohydrate serving  ¨ ½ cup of cooked vegetables or 1 cup of raw vegetables  This includes beets, broccoli, cabbage, cauliflower, cucumber, mushrooms, tomatoes, and zucchini    ¨ ½ cup of vegetable juice  How to use carbohydrate counting to plan meals:   · Count carbohydrate amounts using serving sizes:      ¨ Pasta dinner example: You plan to have pasta, tossed salad, and an 8-ounce glass of milk  Your healthcare provider tells you that you may have 4 carbohydrate servings for dinner  One carbohydrate serving of pasta is ? cup  One cup of pasta will equal 3 carbohydrate servings  An 8-ounce glass of milk will count as 1 carbohydrate serving  These amounts of food would equal 4 carbohydrate servings  One cup of tossed salad does not count toward your carbohydrate servings  · Count carbohydrate amounts using food labels:  Find the total amount of carbohydrate in a packaged food by reading the food label  Food labels tell you the serving size of the food and the total carbohydrate amount in each serving  Find the serving size on the food label and then decide how many servings you will eat  Multiply the number of servings you plan to eat by the carbohydrate amount per serving  ¨ Granola bar snack example: Your meal plan allows you to have 2 carbohydrate servings (30 grams) of carbohydrate for a snack  You plan to eat 1 package of granola bars, which contains 2 bars  According to the food label, the serving size of food in this package is 1 bar  Each serving (1 bar) contains 25 grams of carbohydrate  The total amount of carbohydrate in this package of granola bars would be 50 g  Based on your meal plan, you should eat only 1 bar  Follow up with your healthcare provider as directed:  Write down your questions so you remember to ask them during your visits     © 2017 [facility] Information is for End User's use only and may not be sold, redistributed or otherwise used for commercial purposes  All illustrations and images included in CareNotes® are the copyrighted property of [employer]  or [employer]  The above information is an  only  It is not intended as medical advice for individual conditions or treatments  Talk to your doctor, nurse or pharmacist before following any medical regimen to see if it is safe and effective for you

## 2019-03-05 NOTE — PATIENT INSTRUCTIONS
Continue same medications except increase the Levemir to 14 units subcutaneously once a day  The repaglinide will stay at 0 5 mg orally 3 times a day at each meal   Continue diet and exercise program   Continue to minimize carbs by keeping the total carbohydrate level per day to less than 150 g per day  Refer to the diet she for carbohydrate counting that is below  Continue home blood pressure checks and continue home blood sugar checks  Has an appointment with me in June with labs prior  Basic Carbohydrate Counting   WHAT YOU NEED TO KNOW:   Carbohydrate counting is a way to plan your meals by counting the amount of carbohydrate in foods  Carbohydrates are the sugars, starches, and fiber found in fruit, grains, vegetables, and milk products  Carbohydrates increase your blood sugar levels  Carbohydrate counting can help you eat the right amount of carbohydrate to keep your blood sugar levels under control  DISCHARGE INSTRUCTIONS:   What you need to know about planning meals using carbohydrate counting:  · A dietitian or healthcare provider will help you develop a healthy meal plan that works best for you  You will be taught how much carbohydrate to eat or drink for each meal and snack  Your meal plan will be based on your age, weight, usual food intake, and physical activity level  If you have diabetes, it will also include your blood sugar levels and diabetes medicine  Once you know how much carbohydrate you should eat, you can decide what type of food you want to eat  · You will need to know what foods contain carbohydrate and how much they contain  Keep track of the amount of carbohydrate in meals and snacks in order to follow your meal plan  Do not avoid carbohydrates or skip meals  Your blood sugar may fall too low if you do not eat enough carbohydrate or you skip meals  Foods that contain carbohydrate:   · Breads:  Each serving of food listed below contains about 15 g of carbohydrate       ¨ 1 slice of bread (1 ounce) or 1 flour or corn tortilla (6 inch)    ¨ ½ of a hamburger bun or ¼ of a large bagel (about 1 ounce)    ¨ 1 pancake (about 4 inches across and ¼ inch thick)    · Cereals and grains:  Serving sizes of ready-to-eat cereals vary  Look at the serving size and the total carbohydrate amount listed on the food label  Each serving of food listed below contains about 15 g of carbohydrate   ¨ ¾ cup of dry, unsweetened, ready-to-eat cereal or ¼ cup of low-fat granola     ¨ ½ cup of oatmeal or other cooked cereal     ¨ ? cup of cooked rice or pasta    · Starchy vegetables and beans:  Each serving of food listed below contains about 15 g of carbohydrate   ¨ ½ cup of corn, green peas, sweet potatoes, or mashed potatoes    ¨ ¼ of a large baked potato    ¨ ½ cup of beans, lentils, and peas (garbanzo, parry, kidney, white, split, black-eyed)    · Crackers and snacks:  Each serving of food listed below contains about 15 g of carbohydrate   ¨ 3 fartun cracker squares or 8 animal crackers     ¨ 6 saltine-type crackers    ¨ 3 cups of popcorn or ¾ ounce of pretzels, potato chips, or tortilla chips    · Fruit:  Each serving of food listed below contains about 15 g of carbohydrate   ¨ 1 small (4 ounce) piece of fresh fruit or ¾ to 1 cup of fresh fruit    ¨ ½ cup of canned or frozen fruit, packed in natural juice    ¨ ½ cup (4 ounces) of unsweetened fruit juice    ¨ 2 tablespoons of dried fruit    · Desserts or sugary foods:  Each serving of food listed below contains about 15 g of carbohydrate   ¨ 2-inch square unfrosted cake or brownie     ¨ 2 small cookies    ¨ ½ cup of ice cream, frozen yogurt, or nondairy frozen yogurt    ¨ ¼ cup of sherbet or sorbet    ¨ 1 tablespoon of regular syrup, jam, or jelly    ¨ 2 tablespoons of light syrup    · Milk and yogurt:  Foods from the milk group contain about 12 g of carbohydrate per serving  ¨ 1 cup of fat-free or low-fat milk    ¨ 1 cup of soy milk    ¨ ? cup of fat-free, yogurt sweetened with artificial sweetener    · Non-starchy vegetables:  Each serving contains about 5 g of carbohydrate   Three servings of non-starch vegetables count as 1 carbohydrate serving  ¨ ½ cup of cooked vegetables or 1 cup of raw vegetables  This includes beets, broccoli, cabbage, cauliflower, cucumber, mushrooms, tomatoes, and zucchini    ¨ ½ cup of vegetable juice  How to use carbohydrate counting to plan meals:   · Count carbohydrate amounts using serving sizes:      ¨ Pasta dinner example: You plan to have pasta, tossed salad, and an 8-ounce glass of milk  Your healthcare provider tells you that you may have 4 carbohydrate servings for dinner  One carbohydrate serving of pasta is ? cup  One cup of pasta will equal 3 carbohydrate servings  An 8-ounce glass of milk will count as 1 carbohydrate serving  These amounts of food would equal 4 carbohydrate servings  One cup of tossed salad does not count toward your carbohydrate servings  · Count carbohydrate amounts using food labels:  Find the total amount of carbohydrate in a packaged food by reading the food label  Food labels tell you the serving size of the food and the total carbohydrate amount in each serving  Find the serving size on the food label and then decide how many servings you will eat  Multiply the number of servings you plan to eat by the carbohydrate amount per serving  ¨ Granola bar snack example: Your meal plan allows you to have 2 carbohydrate servings (30 grams) of carbohydrate for a snack  You plan to eat 1 package of granola bars, which contains 2 bars  According to the food label, the serving size of food in this package is 1 bar  Each serving (1 bar) contains 25 grams of carbohydrate  The total amount of carbohydrate in this package of granola bars would be 50 g  Based on your meal plan, you should eat only 1 bar      Follow up with your healthcare provider as directed:  Write down your questions so you remember to ask them during your visits  © 2017 [address] [employer] Information is for End User's use only and may not be sold, redistributed or otherwise used for commercial purposes  All illustrations and images included in CareNotes® are the copyrighted property of [employer]  or [employer]  The above information is an  only  It is not intended as medical advice for individual conditions or treatments  Talk to your doctor, nurse or pharmacist before following any medical regimen to see if it is safe and effective for you

## 2019-03-05 NOTE — ASSESSMENT & PLAN NOTE
Vertigo disappeared when the repaglinide dose was decreased to 0 5 mg  The vertigo has not reappeared

## 2019-03-05 NOTE — ASSESSMENT & PLAN NOTE
Lab Results   Component Value Date    HGBA1C 8 6 (H) 12/03/2018     Repaglinide is back to 0 5mg at each meal  Other meds stay the same  Levemir is at 12 units once a day  No hypoglycemia  Blood Sugar Average: Last 72 hrs: FBS = 130-160  1700 = 160-215 and RADHA = 210  Increase Levemir to 14 units once a day  Keep the repaglinide the same

## 2019-03-27 ENCOUNTER — OFFICE VISIT (OUTPATIENT)
Dept: CARDIOLOGY CLINIC | Facility: CLINIC | Age: 84
End: 2019-03-27
Payer: COMMERCIAL

## 2019-03-27 VITALS
HEIGHT: 63 IN | DIASTOLIC BLOOD PRESSURE: 80 MMHG | SYSTOLIC BLOOD PRESSURE: 160 MMHG | HEART RATE: 71 BPM | OXYGEN SATURATION: 96 % | BODY MASS INDEX: 27.11 KG/M2 | WEIGHT: 153 LBS

## 2019-03-27 DIAGNOSIS — E78.2 MIXED HYPERLIPIDEMIA: ICD-10-CM

## 2019-03-27 DIAGNOSIS — I48.91 ATRIAL FIBRILLATION, UNSPECIFIED TYPE (HCC): ICD-10-CM

## 2019-03-27 DIAGNOSIS — I48.0 PAROXYSMAL ATRIAL FIBRILLATION (HCC): ICD-10-CM

## 2019-03-27 DIAGNOSIS — I10 HYPERTENSION, UNSPECIFIED TYPE: Primary | ICD-10-CM

## 2019-03-27 PROCEDURE — 99215 OFFICE O/P EST HI 40 MIN: CPT | Performed by: INTERNAL MEDICINE

## 2019-03-27 PROCEDURE — 93000 ELECTROCARDIOGRAM COMPLETE: CPT | Performed by: INTERNAL MEDICINE

## 2019-03-27 RX ORDER — FUROSEMIDE 20 MG/1
20 TABLET ORAL EVERY OTHER DAY
Qty: 90 TABLET | Refills: 5
Start: 2019-03-27 | End: 2019-06-24 | Stop reason: SDUPTHER

## 2019-03-27 NOTE — PROGRESS NOTES
Cardiology Follow Up    Tucker Redd  1935  [id number]  [address] [facility] [facility]  [address] [id number] [phone]    1  Hypertension, unspecified type  POCT ECG   2  Paroxysmal atrial fibrillation (HCC)     3  Mixed hyperlipidemia     4  Atrial fibrillation, unspecified type (HCC)  furosemide (LASIX) 20 mg tablet       Patient was 1st seen by us for new onset atrial fibrillation with RVR postop parathyroidectomy by Dr Antwon Mathis  She is a 2nd admission for the same and at that time had a type 2 non ST elevation myocardial infarction  CT scan PE study was negative  Past medical history includes hypertension, hyperlipidemia and diabetes mellitus  05/29/2018 stress test: Negative Persantine stress test  LVEF 77%, hyperdynamic ventricle  Normal tomographic perfusion series  05/23/2018 echocardiogram: Normal left ventricular systolic function, EF 11-96%  Intermediate left ventricular diastolic function  Normal left ventricular filling pressures  Mild-to-moderate left atrial enlargement  Aortic sclerosis without stenosis  12/03/2018 lipid profile: Total cholesterol 143, triglycerides 51, HDL 65, LDL direct 65, non HDL cholesterol 78  Interval History:  Patient denies palpitations and is in normal sinus rhythm today  Denies chest pain, shortness of breath, dizziness, lightheadedness or symptoms of near-syncope/syncope      Patient Active Problem List   Diagnosis    Benign essential hypertension    Female bladder prolapse    Fibrocystic breast disease    Incontinence    Kyphosis    Microproteinuria    Lumbosacral radiculopathy    Ventricular premature depolarization    Tinnitus    Paroxysmal atrial fibrillation (HCC)    Mixed hyperlipidemia    Controlled type 2 diabetes mellitus without complication, with long-term current use of insulin (HCC)    Splenic artery aneurysm ([address] )  Thyroid nodule    Vitamin D deficiency    Vertigo     Past Medical History:   Diagnosis Date    Anemia     IRON DEF ANEMIA DUE TO MENORRHAGIA    Hypercalcemia 10/06/2010    MILD  DECREASES HCTZ    Melanoma ([address] ) 11/2011    IN SITU  MID BACK    Palpitations 05/28/2018    SINUS TACHYCARDIA  TROPONIN SPLL    Skin cancer 2017    NOSE     Social History     Socioeconomic History    Marital status: /Civil Union     Spouse name: Not on file    Number of children: Not on file    Years of education: Not on file    Highest education level: Not on file   Occupational History    Not on file   Social Needs    Financial resource strain: Not on file    Food insecurity:     Worry: Not on file     Inability: Not on file    Transportation needs:     Medical: Not on file     Non-medical: Not on file   Tobacco Use    Smoking status: Never Smoker    Smokeless tobacco: Never Used   Substance and Sexual Activity    Alcohol use: No    Drug use: No    Sexual activity: Never   Lifestyle    Physical activity:     Days per week: Not on file     Minutes per session: Not on file    Stress: Not on file   Relationships    Social connections:     Talks on phone: Not on file     Gets together: Not on file     Attends [employer] service: Not on file     Active member of club or organization: Not on file     Attends meetings of clubs or organizations: Not on file     Relationship status: Not on file    Intimate partner violence:     Fear of current or ex partner: Not on file     Emotionally abused: Not on file     Physically abused: Not on file     Forced sexual activity: Not on file   Other Topics Concern    Not on file   Social History Narrative    Not on file      Family History   Problem Relation Age of Onset    Other Mother         TESTED(-)    Colon cancer Father     Prostate cancer Father     Factor V Leiden deficiency Daughter     Factor V Leiden deficiency Daughter      Past Surgical History: Procedure Laterality Date    GALLBLADDER SURGERY  1991    HYSTERECTOMY  1995    UTERINE PROLAPSE VAGINAL POLYPS    HYSTERECTOMY      IRON REPLACEMENT    OTHER SURGICAL HISTORY  11/2011    EXCISION MELANOMA IN SITU MID BACK    PARATHYROIDECTOMY  05/21/2018       Current Outpatient Medications:     atenolol (TENORMIN) 50 mg tablet, Take 1 tablet (50 mg total) by mouth 2 (two) times a day, Disp: 180 tablet, Rfl: 3    BD PEN NEEDLE RIZWAN U/F 32G X 4 MM MISC, by Other route daily, Disp: 100 each, Rfl: 3    calcium carbonate-vitamin D (OSCAL-D) 500 mg-200 units per tablet, Take 1 tablet by mouth daily with breakfast, Disp: 200 tablet, Rfl: 3    diltiazem (CARDIZEM CD) 120 mg 24 hr capsule, Take 1 capsule (120 mg total) by mouth daily, Disp: 90 capsule, Rfl: 3    furosemide (LASIX) 20 mg tablet, Take 1 tablet (20 mg total) by mouth every other day, Disp: 90 tablet, Rfl: 5    glucose blood (ACCU-CHEK SMARTVIEW) test strip, Test blood sugar once a day, Disp: 50 each, Rfl: 3    insulin detemir (LEVEMIR FLEXTOUCH) 100 Units/mL injection pen, Inject 12 Units under the skin daily at bedtime, Disp: 5 pen, Rfl: 3    MICROLET LANCETS MISC, by Does not apply route daily, Disp: 50 each, Rfl: 3    repaglinide (PRANDIN) 0 5 mg tablet, Take 1 tablet (0 5 mg total) by mouth 3 (three) times a day before meals, Disp: 270 tablet, Rfl: 3    rivaroxaban (XARELTO) 20 mg tablet, Take 1 tablet (20 mg total) by mouth every 24 hours, Disp: 90 tablet, Rfl: 3    simvastatin (ZOCOR) 20 mg tablet, Take 1 tablet (20 mg total) by mouth daily, Disp: 90 tablet, Rfl: 3  Allergies   Allergen Reactions    No Active Allergies        Results for orders placed or performed in visit on 03/27/19   POCT ECG    Narrative    Normal sinus rhythm with 1st degree AV block  Low precordial voltage           Lab Results   Component Value Date    CHOL 128 05/24/2018     Lab Results   Component Value Date    HDL 65 12/03/2018    HDL 59 05/24/2018    HDL 62 02/01/2018     Lab Results   Component Value Date    LDLCALC 59 05/24/2018     Lab Results   Component Value Date    TRIG 51 12/03/2018    TRIG 52 05/24/2018    TRIG 63 02/01/2018     No results found for: CHOLHDL  Lab Results   Component Value Date    GLUCOSE 104 (H) 05/29/2018    CALCIUM 9 7 01/16/2019     05/29/2018    SODIUM 138 01/16/2019    K 3 8 01/16/2019    CO2 30 01/16/2019     01/16/2019    BUN 24 01/16/2019    CREATININE 0 92 01/16/2019     Lab Results   Component Value Date     05/29/2018    SODIUM 138 01/16/2019    K 3 8 01/16/2019     01/16/2019    CO2 30 01/16/2019    ANIONGAP 7 05/29/2018    AGAP 6 01/16/2019    BUN 24 01/16/2019    CREATININE 0 92 01/16/2019    GLUC 261 (H) 01/16/2019    GLUF 91 05/24/2018    CALCIUM 9 7 01/16/2019    AST 19 01/16/2019    ALT 25 01/16/2019    ALKPHOS 105 01/16/2019    PROT 7 1 05/28/2018    TP 8 0 01/16/2019    BILITOT 0 4 05/28/2018    TBILI 0 41 01/16/2019    EGFR 59 01/16/2019     Lab Results   Component Value Date    WBC 10 90 (H) 01/16/2019    HGB 13 6 01/16/2019    HCT 42 6 01/16/2019    MCV 93 01/16/2019     01/16/2019     Lab Results   Component Value Date    [id number] 1 610 01/16/2019         Review of Systems:  Review of Systems   Respiratory: Negative for cough, choking, chest tightness, shortness of breath and wheezing  Cardiovascular: Negative for chest pain, palpitations and leg swelling  Musculoskeletal: Negative for gait problem  Skin: Negative for rash  Neurological: Negative for dizziness, tremors, syncope, weakness, light-headedness, numbness and headaches  Psychiatric/Behavioral: Negative for agitation and behavioral problems  The patient is not hyperactive          Physical Exam:  /80 (BP Location: Left arm, Patient Position: Sitting, Cuff Size: Adult)   Pulse 71   Ht 5' 3" (1 6 m)   Wt 69 4 kg (153 lb)   SpO2 96%   BMI 27 10 kg/m²    Physical Exam   Constitutional: She is oriented to person, place, and time  She appears well-developed and well-nourished  No distress  HENT:   Head: Normocephalic and atraumatic  Neck: No JVD present  No thyromegaly present  Cardiovascular: Normal rate, regular rhythm, normal heart sounds and intact distal pulses  Exam reveals no gallop and no friction rub  No murmur heard  Pulmonary/Chest: No respiratory distress  She has no wheezes  She has no rales  Musculoskeletal: She exhibits no edema  Neurological: She is alert and oriented to person, place, and time  Skin: Skin is warm and dry  Psychiatric: She has a normal mood and affect  Her behavior is normal        Discussion/Summary:  1  Continue present management  2  Return in 6 months

## 2019-04-16 DIAGNOSIS — Z79.4 CONTROLLED TYPE 2 DIABETES MELLITUS WITHOUT COMPLICATION, WITH LONG-TERM CURRENT USE OF INSULIN (HCC): ICD-10-CM

## 2019-04-16 DIAGNOSIS — E11.9 CONTROLLED TYPE 2 DIABETES MELLITUS WITHOUT COMPLICATION, WITH LONG-TERM CURRENT USE OF INSULIN (HCC): ICD-10-CM

## 2019-04-22 DIAGNOSIS — Z79.4 CONTROLLED TYPE 2 DIABETES MELLITUS WITHOUT COMPLICATION, WITH LONG-TERM CURRENT USE OF INSULIN (HCC): ICD-10-CM

## 2019-04-22 DIAGNOSIS — E11.9 CONTROLLED TYPE 2 DIABETES MELLITUS WITHOUT COMPLICATION, WITH LONG-TERM CURRENT USE OF INSULIN (HCC): ICD-10-CM

## 2019-05-07 ENCOUNTER — HOSPITAL ENCOUNTER (OUTPATIENT)
Dept: NON INVASIVE DIAGNOSTICS | Facility: CLINIC | Age: 84
Discharge: HOME | End: 2019-05-07
Payer: COMMERCIAL

## 2019-05-07 DIAGNOSIS — I72.8 SPLENIC ARTERY ANEURYSM (HCC): ICD-10-CM

## 2019-05-07 PROCEDURE — 93975 VASCULAR STUDY: CPT | Performed by: SURGERY

## 2019-05-07 PROCEDURE — 93975 VASCULAR STUDY: CPT

## 2019-06-18 ENCOUNTER — CLINICAL SUPPORT (OUTPATIENT)
Dept: FAMILY MEDICINE CLINIC | Facility: CLINIC | Age: 84
End: 2019-06-18

## 2019-06-18 DIAGNOSIS — E11.22 CONTROLLED TYPE 2 DIABETES MELLITUS WITH STAGE 1 CHRONIC KIDNEY DISEASE, WITHOUT LONG-TERM CURRENT USE OF INSULIN (HCC): ICD-10-CM

## 2019-06-18 DIAGNOSIS — N18.1 CONTROLLED TYPE 2 DIABETES MELLITUS WITH STAGE 1 CHRONIC KIDNEY DISEASE, WITHOUT LONG-TERM CURRENT USE OF INSULIN (HCC): ICD-10-CM

## 2019-06-18 DIAGNOSIS — E55.9 VITAMIN D DEFICIENCY: ICD-10-CM

## 2019-06-19 LAB
25(OH)D3 SERPL-MCNC: 37 NG/ML (ref 30–100)
ALBUMIN SERPL-MCNC: 3.7 G/DL (ref 3.6–5.1)
ALBUMIN/GLOB SERPL: 1.2 (CALC) (ref 1–2.5)
ALP SERPL-CCNC: 93 U/L (ref 33–130)
ALT SERPL-CCNC: 16 U/L (ref 6–29)
AST SERPL-CCNC: 18 U/L (ref 10–35)
BILIRUB SERPL-MCNC: 0.5 MG/DL (ref 0.2–1.2)
BUN SERPL-MCNC: 20 MG/DL (ref 7–25)
BUN/CREAT SERPL: ABNORMAL (CALC) (ref 6–22)
CALCIUM SERPL-MCNC: 9.5 MG/DL (ref 8.6–10.4)
CHLORIDE SERPL-SCNC: 102 MMOL/L (ref 98–110)
CHOLEST SERPL-MCNC: 148 MG/DL
CHOLEST/HDLC SERPL: 2.7 (CALC)
CO2 SERPL-SCNC: 28 MMOL/L (ref 20–32)
CREAT SERPL-MCNC: 0.82 MG/DL (ref 0.6–0.88)
GLOBULIN SER CALC-MCNC: 3.1 G/DL (CALC) (ref 1.9–3.7)
GLUCOSE SERPL-MCNC: 151 MG/DL (ref 65–99)
HBA1C MFR BLD: 8.2 % OF TOTAL HGB
HDLC SERPL-MCNC: 55 MG/DL
LDLC SERPL CALC-MCNC: 79 MG/DL (CALC)
NONHDLC SERPL-MCNC: 93 MG/DL (CALC)
POTASSIUM SERPL-SCNC: 4 MMOL/L (ref 3.5–5.3)
PROT SERPL-MCNC: 6.8 G/DL (ref 6.1–8.1)
SL AMB EGFR AFRICAN AMERICAN: 78 ML/MIN/1.73M2
SL AMB EGFR NON AFRICAN AMERICAN: 67 ML/MIN/1.73M2
SODIUM SERPL-SCNC: 141 MMOL/L (ref 135–146)
TRIGL SERPL-MCNC: 49 MG/DL

## 2019-06-24 ENCOUNTER — OFFICE VISIT (OUTPATIENT)
Dept: FAMILY MEDICINE CLINIC | Facility: CLINIC | Age: 84
End: 2019-06-24
Payer: COMMERCIAL

## 2019-06-24 VITALS
SYSTOLIC BLOOD PRESSURE: 142 MMHG | OXYGEN SATURATION: 97 % | HEIGHT: 63 IN | WEIGHT: 157 LBS | BODY MASS INDEX: 27.82 KG/M2 | RESPIRATION RATE: 18 BRPM | TEMPERATURE: 98.8 F | HEART RATE: 76 BPM | DIASTOLIC BLOOD PRESSURE: 82 MMHG

## 2019-06-24 DIAGNOSIS — E11.9 CONTROLLED TYPE 2 DIABETES MELLITUS WITHOUT COMPLICATION, WITH LONG-TERM CURRENT USE OF INSULIN (HCC): ICD-10-CM

## 2019-06-24 DIAGNOSIS — I48.91 ATRIAL FIBRILLATION, UNSPECIFIED TYPE (HCC): ICD-10-CM

## 2019-06-24 DIAGNOSIS — I48.0 PAROXYSMAL ATRIAL FIBRILLATION (HCC): ICD-10-CM

## 2019-06-24 DIAGNOSIS — E55.9 VITAMIN D DEFICIENCY: ICD-10-CM

## 2019-06-24 DIAGNOSIS — Z00.00 MEDICARE ANNUAL WELLNESS VISIT, INITIAL: Primary | ICD-10-CM

## 2019-06-24 DIAGNOSIS — I49.3 VENTRICULAR PREMATURE DEPOLARIZATION: ICD-10-CM

## 2019-06-24 DIAGNOSIS — I72.8 SPLENIC ARTERY ANEURYSM (HCC): ICD-10-CM

## 2019-06-24 DIAGNOSIS — I10 HYPERTENSION, UNSPECIFIED TYPE: ICD-10-CM

## 2019-06-24 DIAGNOSIS — E78.2 MIXED HYPERLIPIDEMIA: ICD-10-CM

## 2019-06-24 DIAGNOSIS — Z79.4 CONTROLLED TYPE 2 DIABETES MELLITUS WITHOUT COMPLICATION, WITH LONG-TERM CURRENT USE OF INSULIN (HCC): ICD-10-CM

## 2019-06-24 DIAGNOSIS — E78.49 OTHER HYPERLIPIDEMIA: ICD-10-CM

## 2019-06-24 DIAGNOSIS — I10 BENIGN ESSENTIAL HYPERTENSION: ICD-10-CM

## 2019-06-24 PROCEDURE — 1036F TOBACCO NON-USER: CPT | Performed by: FAMILY MEDICINE

## 2019-06-24 PROCEDURE — 1160F RVW MEDS BY RX/DR IN RCRD: CPT | Performed by: FAMILY MEDICINE

## 2019-06-24 PROCEDURE — 1170F FXNL STATUS ASSESSED: CPT | Performed by: FAMILY MEDICINE

## 2019-06-24 PROCEDURE — 1125F AMNT PAIN NOTED PAIN PRSNT: CPT | Performed by: FAMILY MEDICINE

## 2019-06-24 PROCEDURE — G0438 PPPS, INITIAL VISIT: HCPCS | Performed by: FAMILY MEDICINE

## 2019-06-24 PROCEDURE — 99214 OFFICE O/P EST MOD 30 MIN: CPT | Performed by: FAMILY MEDICINE

## 2019-06-24 RX ORDER — SIMVASTATIN 20 MG
20 TABLET ORAL DAILY
Qty: 90 TABLET | Refills: 3 | Status: SHIPPED | OUTPATIENT
Start: 2019-06-24 | End: 2020-07-06 | Stop reason: SDUPTHER

## 2019-06-24 RX ORDER — ATENOLOL 50 MG/1
50 TABLET ORAL 2 TIMES DAILY
Qty: 180 TABLET | Refills: 3 | Status: SHIPPED | OUTPATIENT
Start: 2019-06-24 | End: 2020-08-10 | Stop reason: SDUPTHER

## 2019-06-24 RX ORDER — REPAGLINIDE 0.5 MG/1
0.5 TABLET ORAL
Qty: 270 TABLET | Refills: 3 | Status: SHIPPED | OUTPATIENT
Start: 2019-06-24 | End: 2020-10-20 | Stop reason: SDUPTHER

## 2019-06-24 RX ORDER — DILTIAZEM HYDROCHLORIDE 120 MG/1
120 CAPSULE, COATED, EXTENDED RELEASE ORAL DAILY
Qty: 90 CAPSULE | Refills: 3 | Status: SHIPPED | OUTPATIENT
Start: 2019-06-24 | End: 2020-02-10

## 2019-06-24 RX ORDER — FUROSEMIDE 20 MG/1
20 TABLET ORAL EVERY OTHER DAY
Qty: 90 TABLET | Refills: 5 | Status: SHIPPED | OUTPATIENT
Start: 2019-06-24 | End: 2019-09-12

## 2019-08-27 DIAGNOSIS — E11.9 TYPE 2 DIABETES MELLITUS WITHOUT COMPLICATION, UNSPECIFIED WHETHER LONG TERM INSULIN USE (HCC): ICD-10-CM

## 2019-09-12 ENCOUNTER — OFFICE VISIT (OUTPATIENT)
Dept: CARDIOLOGY CLINIC | Facility: CLINIC | Age: 84
End: 2019-09-12
Payer: COMMERCIAL

## 2019-09-12 VITALS
BODY MASS INDEX: 27.64 KG/M2 | DIASTOLIC BLOOD PRESSURE: 70 MMHG | HEIGHT: 63 IN | WEIGHT: 156 LBS | HEART RATE: 66 BPM | SYSTOLIC BLOOD PRESSURE: 124 MMHG

## 2019-09-12 DIAGNOSIS — I10 BENIGN ESSENTIAL HYPERTENSION: ICD-10-CM

## 2019-09-12 DIAGNOSIS — E78.2 MIXED HYPERLIPIDEMIA: ICD-10-CM

## 2019-09-12 DIAGNOSIS — I48.91 ATRIAL FIBRILLATION, UNSPECIFIED TYPE (HCC): ICD-10-CM

## 2019-09-12 DIAGNOSIS — I48.0 PAROXYSMAL ATRIAL FIBRILLATION (HCC): Primary | ICD-10-CM

## 2019-09-12 PROCEDURE — 93000 ELECTROCARDIOGRAM COMPLETE: CPT | Performed by: INTERNAL MEDICINE

## 2019-09-12 PROCEDURE — 3078F DIAST BP <80 MM HG: CPT | Performed by: INTERNAL MEDICINE

## 2019-09-12 PROCEDURE — 99214 OFFICE O/P EST MOD 30 MIN: CPT | Performed by: INTERNAL MEDICINE

## 2019-09-12 PROCEDURE — 3074F SYST BP LT 130 MM HG: CPT | Performed by: INTERNAL MEDICINE

## 2019-09-12 RX ORDER — FUROSEMIDE 20 MG/1
20 TABLET ORAL DAILY PRN
Qty: 90 TABLET | Refills: 5
Start: 2019-09-12 | End: 2020-01-22 | Stop reason: ALTCHOICE

## 2019-09-12 NOTE — PROGRESS NOTES
Cardiology Follow Up    Mane Don  1935  [id number]  [address] [facility] [id number] [phone]    1  Paroxysmal atrial fibrillation (HCC)  POCT ECG   2  Benign essential hypertension     3  Mixed hyperlipidemia     4  Atrial fibrillation, unspecified type (HCC)  furosemide (LASIX) 20 mg tablet       Patient was 1st seen by us for new onset atrial fibrillation with RVR postop parathyroidectomy by Dr Mikel Santacruz  She is a 2nd admission for the same and at that time had a type 2 non ST elevation myocardial infarction  CT scan PE study was negative  Past medical history includes hypertension, hyperlipidemia and diabetes mellitus  05/29/2018 stress test: Negative Persantine stress test  LVEF 77%, hyperdynamic ventricle  Normal tomographic perfusion series  05/23/2018 echocardiogram: Normal left ventricular systolic function, EF 62-33%  Intermediate left ventricular diastolic function  Normal left ventricular filling pressures  Mild-to-moderate left atrial enlargement  Aortic sclerosis without stenosis  06/18/2019 lipid profile: Total cholesterol 148, triglycerides 49, HDL 55, LDL direct 79    Interval History:  Patient with a history of paroxysmal atrial fibrillation returns  She has a normal sinus rhythm  She has no palpitations  She has no chest pain or shortness of breath  She has had no lightheadedness/dizziness  She has no leg edema despite reducing her Lasix last visit to 20 mg every other day  Her blood pressure is good at 124/70  Her lipid profile is excellent        Patient Active Problem List   Diagnosis    Benign essential hypertension    Female bladder prolapse    Fibrocystic breast disease    Incontinence    Kyphosis    Microproteinuria    Lumbosacral radiculopathy    Ventricular premature depolarization    Tinnitus    Paroxysmal atrial fibrillation (HCC)    Mixed hyperlipidemia    Controlled type 2 diabetes mellitus without complication, with long-term current use of insulin (HCC)    Splenic artery aneurysm (HCC)    Thyroid nodule    Vitamin D deficiency    Vertigo     Past Medical History:   Diagnosis Date    Anemia     IRON DEF ANEMIA DUE TO MENORRHAGIA    Hypercalcemia 10/06/2010    MILD  DECREASES HCTZ    [address] [address] 11/2011    IN SITU  MID BACK    Palpitations 05/28/2018    SINUS TACHYCARDIA  TROPONIN SPLL    Skin cancer 2017    NOSE     Social History     Socioeconomic History    Marital status: /Civil Union     Spouse name: Not on file    Number of children: Not on file    Years of education: Not on file    Highest education level: Not on file   Occupational History    Not on file   Social Needs    Financial resource strain: Not on file    Food insecurity:     Worry: Not on file     Inability: Not on file    Transportation needs:     Medical: Not on file     Non-medical: Not on file   Tobacco Use    Smoking status: Never Smoker    Smokeless tobacco: Never Used   Substance and Sexual Activity    Alcohol use: No    Drug use: No    Sexual activity: Never   Lifestyle    Physical activity:     Days per week: Not on file     Minutes per session: Not on file    Stress: Not on file   Relationships    Social connections:     Talks on phone: Not on file     Gets together: Not on file     Attends Nondenominational service: Not on file     Active member of club or organization: Not on file     Attends meetings of clubs or organizations: Not on file     Relationship status: Not on file    Intimate partner violence:     Fear of current or ex partner: Not on file     Emotionally abused: Not on file     Physically abused: Not on file     Forced sexual activity: Not on file   Other Topics Concern    Not on file   Social History Narrative    Not on file      Family History   Problem Relation Age of Onset    Other Mother         TESTED(-)    Colon cancer Father     Prostate cancer Father     Factor V Leiden deficiency Daughter     Factor V Leiden deficiency Daughter      Past Surgical History:   Procedure Laterality Date    GALLBLADDER SURGERY  1991    HYSTERECTOMY  1995    UTERINE PROLAPSE VAGINAL POLYPS    HYSTERECTOMY      IRON REPLACEMENT    OTHER SURGICAL HISTORY  11/2011    EXCISION MELANOMA IN SITU MID BACK    PARATHYROIDECTOMY  05/21/2018       Current Outpatient Medications:     atenolol (TENORMIN) 50 mg tablet, Take 1 tablet (50 mg total) by mouth 2 (two) times a day, Disp: 180 tablet, Rfl: 3    BD PEN NEEDLE RIZWAN U/F 32G X 4 MM MISC, by Other route daily, Disp: 100 each, Rfl: 3    calcium carbonate-vitamin D (OSCAL-D) 500 mg-200 units per tablet, Take 1 tablet by mouth daily with breakfast, Disp: 200 tablet, Rfl: 3    diltiazem (CARDIZEM CD) 120 mg 24 hr capsule, Take 1 capsule (120 mg total) by mouth daily, Disp: 90 capsule, Rfl: 3    furosemide (LASIX) 20 mg tablet, Take 1 tablet (20 mg total) by mouth daily as needed (As needed for leg swelling and edema), Disp: 90 tablet, Rfl: 5    glucose blood (ACCU-CHEK SMARTVIEW) test strip, TEST BLOOD SUGAR ONCE A DAY, Disp: 100 each, Rfl: 1    insulin detemir (LEVEMIR FLEXTOUCH) 100 Units/mL injection pen, Inject 14 Units under the skin daily at bedtime, Disp: 5 pen, Rfl: 3    MICROLET LANCETS MISC, by Does not apply route daily, Disp: 50 each, Rfl: 3    repaglinide (PRANDIN) 0 5 mg tablet, Take 1 tablet (0 5 mg total) by mouth 3 (three) times a day before meals, Disp: 270 tablet, Rfl: 3    rivaroxaban (XARELTO) 20 mg tablet, Take 1 tablet (20 mg total) by mouth every 24 hours, Disp: 90 tablet, Rfl: 3    simvastatin (ZOCOR) 20 mg tablet, Take 1 tablet (20 mg total) by mouth daily, Disp: 90 tablet, Rfl: 3  Allergies   Allergen Reactions    No Active Allergies        Results for orders placed or performed in visit on 09/12/19   POCT ECG    Narrative    Normal sinus rhythm at a rate of 66 beats per minute  First degree AV block, VA interval 254 milliseconds  Abnormal EKG  Lab Results   Component Value Date    CHOL 128 05/24/2018     Lab Results   Component Value Date    HDL 55 06/18/2019    HDL 65 12/03/2018    HDL 59 05/24/2018     Lab Results   Component Value Date    LDLCALC 59 05/24/2018     Lab Results   Component Value Date    TRIG 49 06/18/2019    TRIG 51 12/03/2018    TRIG 52 05/24/2018     No results found for: CHOLHDL  Lab Results   Component Value Date    SODIUM 141 06/18/2019    K 4 0 06/18/2019     06/18/2019    CO2 28 06/18/2019    BUN 20 06/18/2019    CREATININE 0 82 06/18/2019    GLUC 151 (H) 06/18/2019    CALCIUM 9 5 06/18/2019     Lab Results   Component Value Date     05/29/2018    SODIUM 141 06/18/2019    K 4 0 06/18/2019     06/18/2019    CO2 28 06/18/2019    ANIONGAP 7 05/29/2018    AGAP 6 01/16/2019    BUN 20 06/18/2019    CREATININE 0 82 06/18/2019    GLUC 151 (H) 06/18/2019    GLUF 91 05/24/2018    CALCIUM 9 5 06/18/2019    AST 18 06/18/2019    ALT 16 06/18/2019    ALKPHOS 93 06/18/2019    PROT 7 1 05/28/2018    TP 6 8 06/18/2019    BILITOT 0 4 05/28/2018    TBILI 0 5 06/18/2019    EGFR 59 01/16/2019     Lab Results   Component Value Date    WBC 10 90 (H) 01/16/2019    HGB 13 6 01/16/2019    HCT 42 6 01/16/2019    MCV 93 01/16/2019     01/16/2019     Lab Results   Component Value Date    [id number] 1 610 01/16/2019         Review of Systems:  Review of Systems   Respiratory: Negative for cough, choking, chest tightness, shortness of breath and wheezing  Cardiovascular: Negative for chest pain, palpitations and leg swelling  Musculoskeletal: Negative for gait problem  Skin: Negative for rash  Neurological: Negative for dizziness, tremors, syncope, weakness, light-headedness, numbness and headaches  Psychiatric/Behavioral: Negative for agitation and behavioral problems  The patient is not hyperactive          Physical Exam:  /70 (BP Location: Left arm, Patient Position: Sitting, Cuff Size: Adult)   Pulse 66   Ht 5' 3" (1 6 m)   Wt 70 8 kg (156 lb)   BMI 27 63 kg/m²    Physical Exam   Constitutional: She is oriented to person, place, and time  She appears well-developed and well-nourished  No distress  Patient is overweight, BMI 27 6   HENT:   Head: Normocephalic and atraumatic  Neck: No JVD present  No thyromegaly present  Cardiovascular: Normal rate, regular rhythm, normal heart sounds and intact distal pulses  Exam reveals no gallop and no friction rub  No murmur heard  Pulmonary/Chest: No respiratory distress  She has no wheezes  She has no rales  Musculoskeletal: She exhibits no edema  Neurological: She is alert and oriented to person, place, and time  Skin: Skin is warm and dry  Psychiatric: She has a normal mood and affect  Her behavior is normal        Discussion/Summary:  1  Return in 6 months  2  Change furosemide 20 mg to take daily on a p r n  basis for leg edema

## 2019-10-22 ENCOUNTER — CLINICAL SUPPORT (OUTPATIENT)
Dept: FAMILY MEDICINE CLINIC | Facility: CLINIC | Age: 84
End: 2019-10-22
Payer: COMMERCIAL

## 2019-10-22 DIAGNOSIS — Z23 NEED FOR INFLUENZA VACCINATION: Primary | ICD-10-CM

## 2019-10-22 PROCEDURE — 90662 IIV NO PRSV INCREASED AG IM: CPT | Performed by: FAMILY MEDICINE

## 2019-10-22 PROCEDURE — G0008 ADMIN INFLUENZA VIRUS VAC: HCPCS | Performed by: FAMILY MEDICINE

## 2019-11-05 LAB
LEFT EYE DIABETIC RETINOPATHY: NORMAL
RIGHT EYE DIABETIC RETINOPATHY: NORMAL

## 2019-12-30 ENCOUNTER — CLINICAL SUPPORT (OUTPATIENT)
Dept: FAMILY MEDICINE CLINIC | Facility: CLINIC | Age: 84
End: 2019-12-30
Payer: COMMERCIAL

## 2019-12-30 DIAGNOSIS — E78.2 MIXED HYPERLIPIDEMIA: ICD-10-CM

## 2019-12-30 DIAGNOSIS — Z79.4 CONTROLLED TYPE 2 DIABETES MELLITUS WITHOUT COMPLICATION, WITH LONG-TERM CURRENT USE OF INSULIN (HCC): ICD-10-CM

## 2019-12-30 DIAGNOSIS — E11.9 CONTROLLED TYPE 2 DIABETES MELLITUS WITHOUT COMPLICATION, WITH LONG-TERM CURRENT USE OF INSULIN (HCC): ICD-10-CM

## 2019-12-30 DIAGNOSIS — E55.9 VITAMIN D DEFICIENCY: Primary | ICD-10-CM

## 2019-12-30 LAB
25(OH)D3 SERPL-MCNC: 35.8 NG/ML (ref 30–100)
ALBUMIN SERPL BCP-MCNC: 3.7 G/DL (ref 3.5–5)
ALP SERPL-CCNC: 94 U/L (ref 46–116)
ALT SERPL W P-5'-P-CCNC: 29 U/L (ref 12–78)
ANION GAP SERPL CALCULATED.3IONS-SCNC: 5 MMOL/L (ref 4–13)
AST SERPL W P-5'-P-CCNC: 20 U/L (ref 5–45)
BILIRUB SERPL-MCNC: 0.53 MG/DL (ref 0.2–1)
BUN SERPL-MCNC: 18 MG/DL (ref 5–25)
CALCIUM SERPL-MCNC: 9.8 MG/DL (ref 8.3–10.1)
CHLORIDE SERPL-SCNC: 107 MMOL/L (ref 100–108)
CHOLEST SERPL-MCNC: 152 MG/DL (ref 50–200)
CO2 SERPL-SCNC: 31 MMOL/L (ref 21–32)
CREAT SERPL-MCNC: 0.85 MG/DL (ref 0.6–1.3)
EST. AVERAGE GLUCOSE BLD GHB EST-MCNC: 171 MG/DL
GFR SERPL CREATININE-BSD FRML MDRD: 64 ML/MIN/1.73SQ M
GLUCOSE P FAST SERPL-MCNC: 120 MG/DL (ref 65–99)
HBA1C MFR BLD: 7.6 % (ref 4.2–6.3)
HDLC SERPL-MCNC: 63 MG/DL
LDLC SERPL CALC-MCNC: 79 MG/DL (ref 0–100)
POTASSIUM SERPL-SCNC: 3.6 MMOL/L (ref 3.5–5.3)
PROT SERPL-MCNC: 7.8 G/DL (ref 6.4–8.2)
SODIUM SERPL-SCNC: 143 MMOL/L (ref 136–145)
TRIGL SERPL-MCNC: 50 MG/DL

## 2019-12-30 PROCEDURE — 83036 HEMOGLOBIN GLYCOSYLATED A1C: CPT | Performed by: FAMILY MEDICINE

## 2019-12-30 PROCEDURE — 80053 COMPREHEN METABOLIC PANEL: CPT | Performed by: FAMILY MEDICINE

## 2019-12-30 PROCEDURE — 80061 LIPID PANEL: CPT | Performed by: FAMILY MEDICINE

## 2019-12-30 PROCEDURE — 36415 COLL VENOUS BLD VENIPUNCTURE: CPT | Performed by: FAMILY MEDICINE

## 2019-12-30 PROCEDURE — 82306 VITAMIN D 25 HYDROXY: CPT | Performed by: FAMILY MEDICINE

## 2019-12-30 NOTE — PROGRESS NOTES
Labs drawn     Patient requested [facility]  But we are not able at this time to process thru [facility]   She said to send to [facility] lab this time   Patient advised for future blood work to go to [facility] for draw

## 2020-01-22 ENCOUNTER — OFFICE VISIT (OUTPATIENT)
Dept: FAMILY MEDICINE CLINIC | Facility: CLINIC | Age: 85
End: 2020-01-22
Payer: COMMERCIAL

## 2020-01-22 VITALS
TEMPERATURE: 98.5 F | BODY MASS INDEX: 27.57 KG/M2 | RESPIRATION RATE: 18 BRPM | WEIGHT: 155.6 LBS | DIASTOLIC BLOOD PRESSURE: 90 MMHG | OXYGEN SATURATION: 98 % | HEART RATE: 87 BPM | SYSTOLIC BLOOD PRESSURE: 172 MMHG | HEIGHT: 63 IN

## 2020-01-22 DIAGNOSIS — E11.9 CONTROLLED TYPE 2 DIABETES MELLITUS WITHOUT COMPLICATION, WITH LONG-TERM CURRENT USE OF INSULIN (HCC): Primary | Chronic | ICD-10-CM

## 2020-01-22 DIAGNOSIS — M85.80 OSTEOPENIA, UNSPECIFIED LOCATION: ICD-10-CM

## 2020-01-22 DIAGNOSIS — E55.9 VITAMIN D DEFICIENCY: ICD-10-CM

## 2020-01-22 DIAGNOSIS — I72.8 SPLENIC ARTERY ANEURYSM (HCC): Chronic | ICD-10-CM

## 2020-01-22 DIAGNOSIS — E78.2 MIXED HYPERLIPIDEMIA: Chronic | ICD-10-CM

## 2020-01-22 DIAGNOSIS — Z79.4 CONTROLLED TYPE 2 DIABETES MELLITUS WITHOUT COMPLICATION, WITH LONG-TERM CURRENT USE OF INSULIN (HCC): Primary | Chronic | ICD-10-CM

## 2020-01-22 DIAGNOSIS — N60.19 FIBROCYSTIC BREAST DISEASE (FCBD), UNSPECIFIED LATERALITY: ICD-10-CM

## 2020-01-22 DIAGNOSIS — I10 BENIGN ESSENTIAL HYPERTENSION: Chronic | ICD-10-CM

## 2020-01-22 DIAGNOSIS — I48.0 PAROXYSMAL ATRIAL FIBRILLATION (HCC): ICD-10-CM

## 2020-01-22 DIAGNOSIS — Z12.31 ENCOUNTER FOR SCREENING MAMMOGRAM FOR MALIGNANT NEOPLASM OF BREAST: ICD-10-CM

## 2020-01-22 PROBLEM — M54.17 LUMBOSACRAL RADICULOPATHY: Status: RESOLVED | Noted: 2017-04-10 | Resolved: 2020-01-22

## 2020-01-22 PROCEDURE — 1160F RVW MEDS BY RX/DR IN RCRD: CPT | Performed by: FAMILY MEDICINE

## 2020-01-22 PROCEDURE — 99214 OFFICE O/P EST MOD 30 MIN: CPT | Performed by: FAMILY MEDICINE

## 2020-01-22 NOTE — ASSESSMENT & PLAN NOTE
CMP equals within normal limits on 12/30/2019  Home blood pressure checks show systolics to range between 135 and 946 and diastolics in the 60Y and low 70s  Blood pressure is controlled on present treatment regimen, at least at home  Patient misses no doses and tolerates the meds without side effects  Patient avoids salt  Discussed diet, exercise and weight control  No change in present meds  Continue HBPM   Will bring her back in 2 days, in the middle of the morning to recheck her blood pressure  We need to be sure that her blood pressure is controlled and matches her home blood pressures  I want her to come in before she goes to her exercise class and having taken her morning blood pressure medications  I also do not want her to drink any coffee that morning

## 2020-01-22 NOTE — PROGRESS NOTES
Patient's shoes and socks removed  Right Foot/Ankle   Right Foot Inspection    Toe Exam: ROM and strength within normal limits  Sensory       Monofilament testing: intact  Vascular    The right DP pulse is 2+  The right PT pulse is 2+  Left Foot/Ankle  Left Foot Inspection                           Toe Exam: ROM and strength within normal limits                   Sensory       Monofilament: intact  Vascular    The left DP pulse is 2+  The left PT pulse is 2+  Assign Risk Category:  Deformity present; No loss of protective sensation; No weak pulses       Risk: 1    BMI Counseling: Body mass index is 28 01 kg/m²  The BMI is above normal  Nutrition recommendations include reducing portion sizes, decreasing overall calorie intake, 3-5 servings of fruits/vegetables daily, reducing fast food intake, consuming healthier snacks, decreasing soda and/or juice intake, moderation in carbohydrate intake, increasing intake of lean protein, reducing intake of saturated fat and trans fat and reducing intake of cholesterol  Exercise recommendations include moderate aerobic physical activity for 150 minutes/week, exercising 3-5 times per week and joining a gym  Assessment/Plan:    Splenic artery aneurysm (HCC)  Followed by [facility]  Last ultrasound was on 5/07/19 and is unchanged  Paroxysmal atrial fibrillation ([address] )  Followed by Cardiology, every 6 months  Last visit was in 09/2019  The furosemide was decreased to just p r n   No symptoms of recurrence of atrial fibrillation  Benign essential hypertension  CMP equals within normal limits on 12/30/2019  Home blood pressure checks show systolics to range between 135 and 756 and diastolics in the 99P and low 70s  Blood pressure is controlled on present treatment regimen, at least at home  Patient misses no doses and tolerates the meds without side effects  Patient avoids salt  Discussed diet, exercise and weight control  No change in present meds   Continue HBPM  Will bring her back in 2 days, in the middle of the morning to recheck her blood pressure  We need to be sure that her blood pressure is controlled and matches her home blood pressures  I want her to come in before she goes to her exercise class and having taken her morning blood pressure medications  I also do not want her to drink any coffee that morning  Mixed hyperlipidemia  12/30/19: HDL/LDL/TG = 63/79/50  On simvastatin 20 mg p o  Daily  Continue simvastatin at 20 mg orally once a day  Continue diet and exercise program     Fibrocystic breast disease  Due for Mammogram     BMI 28 0-28 9,adult  Has lost 2 lb since her visit with me in 06/2019  Exercise: goes to exercise classes, 3x/week - "[employer]"  DIET: low CHO and eating smaller portions  Continue diet and exercise program     Vitamin D deficiency  12/30/2019-vitamin-D level equals 36  Controlled type 2 diabetes mellitus without complication, with long-term current use of insulin ([facility])    Lab Results   Component Value Date    HGBA1C 7 6 (H) 12/30/2019 12/30/19: HbA1c = 7 6%  Improved from 8 2% on 06/18/2019 and 8 6% on 12/03/2018  Fasting blood sugar equals 120 on 12/30/2019  HBSM: FBS = 110-135  9pm = 145-175  Meds reconciled  Levemir insulin remains at 14 units, every evening  No change in meds or insulin  Diagnoses and all orders for this visit:    Controlled type 2 diabetes mellitus without complication, with long-term current use of insulin (HCC)    BMI 28 0-28 9,adult    Osteopenia, unspecified location  -     DXA bone density spine hip and pelvis; Future    Encounter for screening mammogram for malignant neoplasm of breast  -     Mammo screening bilateral w cad;  Future    Splenic artery aneurysm (HCC)    Benign essential hypertension    Mixed hyperlipidemia    Paroxysmal atrial fibrillation (HCC)    Fibrocystic breast disease ([facility]), unspecified laterality    Vitamin D deficiency          Subjective:     Chief Complaint   Patient presents with    Hypertension    Diabetes        Patient ID: Marybeth Valverde is a 82 y o  female  HPI :  Diabetic foot exam and chronic multiple medical problems  The following portions of the patient's history were reviewed and updated as appropriate: allergies, current medications, past family history, past medical history, past social history, past surgical history and problem list     Review of Systems   Constitutional: Negative for activity change, appetite change, fatigue, fever and unexpected weight change  HENT: Positive for hearing loss  Negative for congestion, dental problem, ear discharge, ear pain and sneezing  Recovering from URI and her left ear feels plugged  There is no pain  Eyes: Negative for discharge and visual disturbance  Last eye check was one month ago - Dr Edi Rodriguez of [address]  No problems  Respiratory: Negative for cough, chest tightness, shortness of breath and wheezing  Cardiovascular: Negative for chest pain, palpitations and leg swelling  Gastrointestinal: Negative for abdominal pain, constipation, diarrhea, nausea and vomiting  Endocrine: Negative for polydipsia and polyuria  Genitourinary: Negative for dysuria and frequency  Musculoskeletal: Negative for arthralgias  Skin: Negative for rash  Allergic/Immunologic: Negative for environmental allergies and food allergies  Neurological: Negative for dizziness, light-headedness and headaches  Hematological: Negative for adenopathy  Psychiatric/Behavioral: Negative for behavioral problems and sleep disturbance           Objective:  Vitals:    01/22/20 1426 01/22/20 1511   BP: (!) 186/96 (!) 172/90   BP Location: Left arm    Patient Position: Sitting    Cuff Size: Standard    Pulse: 87    Resp: 18    Temp: 98 5 °F (36 9 °C)    TempSrc: Temporal    SpO2: 98%    Weight: 70 6 kg (155 lb 9 6 oz)    Height: 5' 2 5" (1 588 m)       Physical Exam   Constitutional: She is oriented to person, place, and time  She appears well-developed and well-nourished  HENT:   Head: Normocephalic  Right Ear: Hearing, tympanic membrane, external ear and ear canal normal    Left Ear: No decreased hearing is noted  Ears:    Nose: Nose normal    Mouth/Throat: Oropharynx is clear and moist    Eyes: Pupils are equal, round, and reactive to light  Conjunctivae are normal  Right eye exhibits no discharge  Left eye exhibits no discharge  Neck: Normal range of motion  Neck supple  No thyromegaly present  Cardiovascular: Normal rate, regular rhythm and normal heart sounds  Pulses are no weak pulses  No murmur heard  Pulses:       Dorsalis pedis pulses are 2+ on the right side, and 2+ on the left side  Posterior tibial pulses are 2+ on the right side, and 2+ on the left side  Pulmonary/Chest: Effort normal and breath sounds normal  She has no wheezes  She has no rales  Abdominal: Soft  Bowel sounds are normal  There is no tenderness  Musculoskeletal: Normal range of motion  Feet:    Lymphadenopathy:     She has no cervical adenopathy  Neurological: She is alert and oriented to person, place, and time  Skin: Skin is warm  No rash noted  Psychiatric: She has a normal mood and affect   Her behavior is normal

## 2020-01-22 NOTE — ASSESSMENT & PLAN NOTE
Followed by Cardiology, every 6 months  Last visit was in 09/2019  The furosemide was decreased to just p r n   No symptoms of recurrence of atrial fibrillation

## 2020-01-22 NOTE — ASSESSMENT & PLAN NOTE
Lab Results   Component Value Date    HGBA1C 7 6 (H) 12/30/2019 12/30/19: HbA1c = 7 6%  Improved from 8 2% on 06/18/2019 and 8 6% on 12/03/2018  Fasting blood sugar equals 120 on 12/30/2019  HBSM: FBS = 110-135  9pm = 145-175  Meds reconciled  Levemir insulin remains at 14 units, every evening  No change in meds or insulin

## 2020-01-22 NOTE — PATIENT INSTRUCTIONS
Continue diet and exercise program   Continue to avoid salt  Continue to check blood pressures and blood sugars at home  No change in the present dose of your Levemir insulin  Come back in 2 days for a nurse check for blood pressure recheck  We can then make decision about follow-up visits  Recommendations for weight loss:   Eat smaller portions at meals and drink water with meals  Avoid bread and bread products, like bagels and muffins  Avoid fast foods  Eat HEALTHY snacks  Avoid drinking soda or sweet drinks  Eat 3 to 5 servings of fruit and vegetables per day  Eat more fish and only lean meat

## 2020-01-22 NOTE — ASSESSMENT & PLAN NOTE
Has lost 2 lb since her visit with me in 06/2019  Exercise: goes to exercise classes, 3x/week - "[employer]"  DIET: low CHO and eating smaller portions    Continue diet and exercise program

## 2020-01-22 NOTE — ASSESSMENT & PLAN NOTE
12/30/19: HDL/LDL/TG = 63/79/50  On simvastatin 20 mg p o  Daily  Continue simvastatin at 20 mg orally once a day    Continue diet and exercise program

## 2020-01-24 ENCOUNTER — CLINICAL SUPPORT (OUTPATIENT)
Dept: FAMILY MEDICINE CLINIC | Facility: CLINIC | Age: 85
End: 2020-01-24

## 2020-01-24 VITALS — SYSTOLIC BLOOD PRESSURE: 155 MMHG | DIASTOLIC BLOOD PRESSURE: 90 MMHG

## 2020-01-24 DIAGNOSIS — I10 HYPERTENSION, UNSPECIFIED TYPE: Primary | ICD-10-CM

## 2020-01-31 ENCOUNTER — CLINICAL SUPPORT (OUTPATIENT)
Dept: FAMILY MEDICINE CLINIC | Facility: CLINIC | Age: 85
End: 2020-01-31

## 2020-01-31 VITALS — DIASTOLIC BLOOD PRESSURE: 90 MMHG | SYSTOLIC BLOOD PRESSURE: 180 MMHG

## 2020-01-31 DIAGNOSIS — I10 BENIGN ESSENTIAL HYPERTENSION: Primary | ICD-10-CM

## 2020-01-31 DIAGNOSIS — I10 HYPERTENSION, UNSPECIFIED TYPE: Primary | ICD-10-CM

## 2020-01-31 RX ORDER — HYDROCHLOROTHIAZIDE 12.5 MG/1
12.5 TABLET ORAL DAILY
Qty: 90 TABLET | Refills: 1 | Status: SHIPPED | OUTPATIENT
Start: 2020-01-31 | End: 2020-03-09 | Stop reason: DRUGHIGH

## 2020-02-10 DIAGNOSIS — I10 HYPERTENSION, UNSPECIFIED TYPE: ICD-10-CM

## 2020-02-10 RX ORDER — DILTIAZEM HYDROCHLORIDE 120 MG/1
CAPSULE, COATED, EXTENDED RELEASE ORAL
Qty: 90 CAPSULE | Refills: 3 | Status: SHIPPED | OUTPATIENT
Start: 2020-02-10 | End: 2020-03-18 | Stop reason: DRUGHIGH

## 2020-03-09 ENCOUNTER — OFFICE VISIT (OUTPATIENT)
Dept: FAMILY MEDICINE CLINIC | Facility: CLINIC | Age: 85
End: 2020-03-09
Payer: COMMERCIAL

## 2020-03-09 VITALS
OXYGEN SATURATION: 96 % | HEIGHT: 63 IN | RESPIRATION RATE: 18 BRPM | BODY MASS INDEX: 27.18 KG/M2 | DIASTOLIC BLOOD PRESSURE: 90 MMHG | WEIGHT: 153.4 LBS | SYSTOLIC BLOOD PRESSURE: 158 MMHG | TEMPERATURE: 98.3 F | HEART RATE: 80 BPM

## 2020-03-09 DIAGNOSIS — I10 BENIGN ESSENTIAL HYPERTENSION: Primary | ICD-10-CM

## 2020-03-09 DIAGNOSIS — I48.0 PAROXYSMAL ATRIAL FIBRILLATION (HCC): ICD-10-CM

## 2020-03-09 DIAGNOSIS — E11.9 TYPE 2 DIABETES MELLITUS WITHOUT COMPLICATION, UNSPECIFIED WHETHER LONG TERM INSULIN USE (HCC): ICD-10-CM

## 2020-03-09 PROCEDURE — 3008F BODY MASS INDEX DOCD: CPT | Performed by: FAMILY MEDICINE

## 2020-03-09 PROCEDURE — 3080F DIAST BP >= 90 MM HG: CPT | Performed by: FAMILY MEDICINE

## 2020-03-09 PROCEDURE — 4040F PNEUMOC VAC/ADMIN/RCVD: CPT | Performed by: FAMILY MEDICINE

## 2020-03-09 PROCEDURE — 1036F TOBACCO NON-USER: CPT | Performed by: FAMILY MEDICINE

## 2020-03-09 PROCEDURE — 3077F SYST BP >= 140 MM HG: CPT | Performed by: FAMILY MEDICINE

## 2020-03-09 PROCEDURE — 2022F DILAT RTA XM EVC RTNOPTHY: CPT | Performed by: FAMILY MEDICINE

## 2020-03-09 PROCEDURE — 1160F RVW MEDS BY RX/DR IN RCRD: CPT | Performed by: FAMILY MEDICINE

## 2020-03-09 PROCEDURE — 3066F NEPHROPATHY DOC TX: CPT | Performed by: FAMILY MEDICINE

## 2020-03-09 PROCEDURE — 99214 OFFICE O/P EST MOD 30 MIN: CPT | Performed by: FAMILY MEDICINE

## 2020-03-09 RX ORDER — HYDROCHLOROTHIAZIDE 25 MG/1
25 TABLET ORAL DAILY
Qty: 90 TABLET | Refills: 3 | Status: SHIPPED | OUTPATIENT
Start: 2020-03-09 | End: 2020-08-10 | Stop reason: SINTOL

## 2020-03-09 NOTE — PROGRESS NOTES
Assessment/Plan:    Benign essential hypertension  Last visit was 1/28/19 and I added HCTZ for BP control  HBPM: SBP = 140s & 150s and the DBP = 70s & 80s  Drinks water during the day and green tea  Avoids salt  Paroxysmal atrial fibrillation (HCC)  Clinically, has not felt that she has gone back into atrial fibrillation  Followed by Cardiology and has a 6 month appointment next week  Diagnoses and all orders for this visit:    Benign essential hypertension  -     hydrochlorothiazide (HYDRODIURIL) 25 mg tablet; Take 1 tablet (25 mg total) by mouth daily    Paroxysmal atrial fibrillation (HCC)          Subjective:     Chief Complaint   Patient presents with    Hypertension    Diabetes        Patient ID: Sanya Ba is a 82 y o  female  HPI : Multiple Chronic Medical Conditions    The following portions of the patient's history were reviewed and updated as appropriate: allergies, current medications, past family history, past medical history, past social history, past surgical history and problem list     Review of Systems   Constitutional: Negative for activity change, appetite change, fatigue, fever and unexpected weight change  HENT: Negative for congestion, dental problem and sneezing  Eyes: Negative for discharge and visual disturbance  Respiratory: Negative for cough, chest tightness, shortness of breath and wheezing  Cardiovascular: Negative for chest pain, palpitations and leg swelling  Gastrointestinal: Negative for abdominal pain, constipation, diarrhea, nausea and vomiting  Endocrine: Negative for polydipsia and polyuria  Genitourinary: Negative for dysuria and frequency  Musculoskeletal: Negative for arthralgias  Skin: Negative for rash  Allergic/Immunologic: Negative for environmental allergies and food allergies  Neurological: Negative for dizziness, light-headedness and headaches  Hematological: Negative for adenopathy     Psychiatric/Behavioral: Negative for behavioral problems and sleep disturbance  Objective:  Vitals:    03/09/20 1527 03/09/20 1550   BP: (!) 186/88 158/90   BP Location: Left arm    Patient Position: Sitting    Cuff Size: Large    Pulse: 83 80   Resp: 18    Temp: 98 3 °F (36 8 °C)    TempSrc: Tympanic    SpO2: 96%    Weight: 69 6 kg (153 lb 6 4 oz)    Height: 5' 2 5" (1 588 m)       Physical Exam   Constitutional: She is oriented to person, place, and time  She appears well-developed and well-nourished  HENT:   Head: Normocephalic  Eyes: Pupils are equal, round, and reactive to light  Conjunctivae are normal  Right eye exhibits no discharge  Left eye exhibits no discharge  Neck: Normal range of motion  Neck supple  No thyromegaly present  Cardiovascular: Normal rate, regular rhythm and normal heart sounds  No murmur heard  Pulmonary/Chest: Effort normal and breath sounds normal  She has no rales  Abdominal: Soft  Bowel sounds are normal  There is no tenderness  Musculoskeletal: Normal range of motion  She exhibits no edema  Lymphadenopathy:     She has no cervical adenopathy  Neurological: She is alert and oriented to person, place, and time  Skin: Skin is warm  No rash noted  Psychiatric: She has a normal mood and affect  Her behavior is normal  Judgment and thought content normal        Patient Instructions   Continue to avoid salt but continue also to be drinking about 5 glasses of water a day and no should be 8 oz glasses  Increase hydrochlorothiazide to 25 mg, orally, once a day  You can use up year 12 5 mg tablets by taking 2 at a time  Other medications will stay the same  Want you to continue checking blood pressures for me at least 3 or 4 times a week  I will see you back in 3 weeks and will go over your blood pressures  I will draw a basic metabolic profile at that visit

## 2020-03-09 NOTE — PATIENT INSTRUCTIONS
Continue to avoid salt but continue also to be drinking about 5 glasses of water a day and no should be 8 oz glasses  Increase hydrochlorothiazide to 25 mg, orally, once a day  You can use up year 12 5 mg tablets by taking 2 at a time  Other medications will stay the same  Want you to continue checking blood pressures for me at least 3 or 4 times a week  I will see you back in 3 weeks and will go over your blood pressures  I will draw a basic metabolic profile at that visit

## 2020-03-09 NOTE — ASSESSMENT & PLAN NOTE
Clinically, has not felt that she has gone back into atrial fibrillation  Followed by Cardiology and has a 6 month appointment next week

## 2020-03-09 NOTE — ASSESSMENT & PLAN NOTE
Last visit was 1/28/19 and I added HCTZ for BP control  HBPM: SBP = 140s & 150s and the DBP = 70s & 80s  Drinks water during the day and green tea  Avoids salt

## 2020-03-18 ENCOUNTER — OFFICE VISIT (OUTPATIENT)
Dept: CARDIOLOGY CLINIC | Facility: CLINIC | Age: 85
End: 2020-03-18
Payer: COMMERCIAL

## 2020-03-18 VITALS
SYSTOLIC BLOOD PRESSURE: 150 MMHG | WEIGHT: 152 LBS | BODY MASS INDEX: 27.97 KG/M2 | HEART RATE: 65 BPM | DIASTOLIC BLOOD PRESSURE: 90 MMHG | HEIGHT: 62 IN

## 2020-03-18 DIAGNOSIS — I10 BENIGN ESSENTIAL HYPERTENSION: ICD-10-CM

## 2020-03-18 DIAGNOSIS — E78.2 MIXED HYPERLIPIDEMIA: ICD-10-CM

## 2020-03-18 DIAGNOSIS — E11.9 CONTROLLED TYPE 2 DIABETES MELLITUS WITHOUT COMPLICATION, WITH LONG-TERM CURRENT USE OF INSULIN (HCC): ICD-10-CM

## 2020-03-18 DIAGNOSIS — Z79.4 CONTROLLED TYPE 2 DIABETES MELLITUS WITHOUT COMPLICATION, WITH LONG-TERM CURRENT USE OF INSULIN (HCC): ICD-10-CM

## 2020-03-18 DIAGNOSIS — I48.0 PAROXYSMAL ATRIAL FIBRILLATION (HCC): Primary | ICD-10-CM

## 2020-03-18 PROCEDURE — 99214 OFFICE O/P EST MOD 30 MIN: CPT | Performed by: INTERNAL MEDICINE

## 2020-03-18 PROCEDURE — 3066F NEPHROPATHY DOC TX: CPT | Performed by: INTERNAL MEDICINE

## 2020-03-18 PROCEDURE — 2022F DILAT RTA XM EVC RTNOPTHY: CPT | Performed by: INTERNAL MEDICINE

## 2020-03-18 PROCEDURE — 1036F TOBACCO NON-USER: CPT | Performed by: INTERNAL MEDICINE

## 2020-03-18 PROCEDURE — 3080F DIAST BP >= 90 MM HG: CPT | Performed by: INTERNAL MEDICINE

## 2020-03-18 PROCEDURE — 3077F SYST BP >= 140 MM HG: CPT | Performed by: INTERNAL MEDICINE

## 2020-03-18 PROCEDURE — 4040F PNEUMOC VAC/ADMIN/RCVD: CPT | Performed by: INTERNAL MEDICINE

## 2020-03-18 PROCEDURE — 93000 ELECTROCARDIOGRAM COMPLETE: CPT | Performed by: INTERNAL MEDICINE

## 2020-03-18 PROCEDURE — 3008F BODY MASS INDEX DOCD: CPT | Performed by: INTERNAL MEDICINE

## 2020-03-18 PROCEDURE — 1160F RVW MEDS BY RX/DR IN RCRD: CPT | Performed by: INTERNAL MEDICINE

## 2020-03-18 RX ORDER — DILTIAZEM HYDROCHLORIDE 240 MG/1
240 CAPSULE, COATED, EXTENDED RELEASE ORAL DAILY
Qty: 90 CAPSULE | Refills: 3 | Status: SHIPPED | OUTPATIENT
Start: 2020-03-18 | End: 2020-05-20

## 2020-03-18 NOTE — PROGRESS NOTES
Cardiology Follow Up    Marybeth Valverde  1935  [id number]  [address] [facility] [id number] [phone]    1  Paroxysmal atrial fibrillation (HCC)  POCT ECG   2  Benign essential hypertension  diltiazem (CARDIZEM CD) 240 mg 24 hr capsule   3  Mixed hyperlipidemia     4  Controlled type 2 diabetes mellitus without complication, with long-term current use of insulin ([address] )         Patient was 1st seen by us for new onset atrial fibrillation with RVR postop parathyroidectomy by Dr Jason Pickett  She is a 2nd admission for the same and at that time had a type 2 non ST elevation myocardial infarction  CT scan PE study was negative  Past medical history includes hypertension, hyperlipidemia and diabetes mellitus  05/29/2018 stress test: Negative Persantine stress test  LVEF 77%, hyperdynamic ventricle  Normal tomographic perfusion series  05/23/2018 echocardiogram: Normal left ventricular systolic function, EF 35-00%  Intermediate left ventricular diastolic function  Normal left ventricular filling pressures  Mild-to-moderate left atrial enlargement  Aortic sclerosis without stenosis  06/18/2019 lipid profile: Total cholesterol 148, triglycerides 49, HDL 55, LDL direct 79    Interval History:  Patient has no chest discomfort or shortness of breath  She denies palpitations  She denies dizziness or lightheadedness  Her blood pressure is 150/90 and it was the same at her last visit 6 months ago  She has not taken any Lasix over the last 6 months and has had no swelling in her legs        Patient Active Problem List   Diagnosis    Benign essential hypertension    Female bladder prolapse    Fibrocystic breast disease    Incontinence    Kyphosis    Microproteinuria    Ventricular premature depolarization    Tinnitus    Paroxysmal atrial fibrillation (HCC)    Mixed hyperlipidemia    Controlled type 2 diabetes mellitus without complication, with long-term current use of insulin (HCC)    Splenic artery aneurysm (HCC)    Thyroid nodule    Vitamin D deficiency    Vertigo    BMI 28 0-28 9,adult     Past Medical History:   Diagnosis Date    Anemia     IRON DEF ANEMIA DUE TO MENORRHAGIA    Hypercalcemia 10/06/2010    MILD  DECREASES HCTZ    Melanoma ([facility] ) 11/2011    IN SITU  MID BACK    Palpitations 05/28/2018    SINUS TACHYCARDIA  TROPONIN SPLL    Skin cancer 2017    NOSE     Social History     Socioeconomic History    Marital status: /Civil Union     Spouse name: Not on file    Number of children: Not on file    Years of education: Not on file    Highest education level: Not on file   Occupational History    Not on file   Social Needs    Financial resource strain: Not on file    Food insecurity:     Worry: Not on file     Inability: Not on file    Transportation needs:     Medical: Not on file     Non-medical: Not on file   Tobacco Use    Smoking status: Never Smoker    Smokeless tobacco: Never Used   Substance and Sexual Activity    Alcohol use: No    Drug use: No    Sexual activity: Never   Lifestyle    Physical activity:     Days per week: Not on file     Minutes per session: Not on file    Stress: Not on file   Relationships    Social connections:     Talks on phone: Not on file     Gets together: Not on file     Attends Sikh service: Not on file     Active member of club or organization: Not on file     Attends meetings of clubs or organizations: Not on file     Relationship status: Not on file    Intimate partner violence:     Fear of current or ex partner: Not on file     Emotionally abused: Not on file     Physically abused: Not on file     Forced sexual activity: Not on file   Other Topics Concern    Not on file   Social History Narrative    Not on file      Family History   Problem Relation Age of Onset    Other Mother         TESTED(-)    Colon cancer Father     Prostate cancer Father     Factor V Leiden deficiency Daughter     Factor V Leiden deficiency Daughter      Past Surgical History:   Procedure Laterality Date    GALLBLADDER SURGERY  1991    HYSTERECTOMY  1995    UTERINE PROLAPSE VAGINAL POLYPS    HYSTERECTOMY      IRON REPLACEMENT    OTHER SURGICAL HISTORY  11/2011    EXCISION MELANOMA IN SITU MID BACK    PARATHYROIDECTOMY  05/21/2018       Current Outpatient Medications:     atenolol (TENORMIN) 50 mg tablet, Take 1 tablet (50 mg total) by mouth 2 (two) times a day, Disp: 180 tablet, Rfl: 3    BD PEN NEEDLE RIZWAN U/F 32G X 4 MM MISC, by Other route daily, Disp: 100 each, Rfl: 3    calcium carbonate-vitamin D (OSCAL-D) 500 mg-200 units per tablet, Take 1 tablet by mouth daily with breakfast, Disp: 200 tablet, Rfl: 3    glucose blood (Accu-Chek SmartView) test strip, TEST BLOOD SUGAR ONCE A DAY, Disp: 100 each, Rfl: 1    hydrochlorothiazide (HYDRODIURIL) 25 mg tablet, Take 1 tablet (25 mg total) by mouth daily, Disp: 90 tablet, Rfl: 3    insulin detemir (LEVEMIR FLEXTOUCH) 100 Units/mL injection pen, Inject 14 Units under the skin daily at bedtime, Disp: 5 pen, Rfl: 3    MICROLET LANCETS MISC, by Does not apply route daily, Disp: 50 each, Rfl: 3    repaglinide (PRANDIN) 0 5 mg tablet, Take 1 tablet (0 5 mg total) by mouth 3 (three) times a day before meals, Disp: 270 tablet, Rfl: 3    rivaroxaban (XARELTO) 20 mg tablet, Take 1 tablet (20 mg total) by mouth every 24 hours, Disp: 90 tablet, Rfl: 3    simvastatin (ZOCOR) 20 mg tablet, Take 1 tablet (20 mg total) by mouth daily, Disp: 90 tablet, Rfl: 3    diltiazem (CARDIZEM CD) 240 mg 24 hr capsule, Take 1 capsule (240 mg total) by mouth daily, Disp: 90 capsule, Rfl: 3  Allergies   Allergen Reactions    No Active Allergies        Results for orders placed or performed in visit on 03/18/20   POCT ECG    Narrative    Normal sinus rhythm with 1st degree a the block at a rate of 65 beats per minute    Minor nonspecific ST abnormality is abnormal EKG         Lab Results   Component Value Date    CHOLESTEROL 152 12/30/2019    CHOLESTEROL 148 06/18/2019    CHOLESTEROL 143 12/03/2018     Lab Results   Component Value Date    HDL 63 12/30/2019    HDL 55 06/18/2019    HDL 65 12/03/2018     Lab Results   Component Value Date    TRIG 50 12/30/2019    TRIG 49 06/18/2019    TRIG 51 12/03/2018     No results found for: [place]  Lab Results   Component Value Date    SODIUM 143 12/30/2019    K 3 6 12/30/2019     12/30/2019    CO2 31 12/30/2019    BUN 18 12/30/2019    CREATININE 0 85 12/30/2019    GLUC 151 (H) 06/18/2019    CALCIUM 9 8 12/30/2019     Lab Results   Component Value Date     05/29/2018    SODIUM 143 12/30/2019    K 3 6 12/30/2019     12/30/2019    CO2 31 12/30/2019    ANIONGAP 7 05/29/2018    AGAP 5 12/30/2019    BUN 18 12/30/2019    CREATININE 0 85 12/30/2019    GLUC 151 (H) 06/18/2019    GLUF 120 (H) 12/30/2019    CALCIUM 9 8 12/30/2019    AST 20 12/30/2019    ALT 29 12/30/2019    ALKPHOS 94 12/30/2019    PROT 7 1 05/28/2018    TP 7 8 12/30/2019    BILITOT 0 4 05/28/2018    TBILI 0 53 12/30/2019    EGFR 64 12/30/2019     Lab Results   Component Value Date    WBC 10 90 (H) 01/16/2019    HGB 13 6 01/16/2019    HCT 42 6 01/16/2019    MCV 93 01/16/2019     01/16/2019     Lab Results   Component Value Date    PUX4OXSNAZNM 1 610 01/16/2019         Review of Systems:  Review of Systems   Respiratory: Negative for cough, choking, chest tightness, shortness of breath and wheezing  Cardiovascular: Negative for chest pain, palpitations and leg swelling  Musculoskeletal: Negative for gait problem  Skin: Negative for rash  Neurological: Negative for dizziness, tremors, syncope, weakness, light-headedness, numbness and headaches  Psychiatric/Behavioral: Negative for agitation and behavioral problems  The patient is not hyperactive          Physical Exam:  /90 (BP Location: Right arm, Patient Position: Sitting, Cuff Size: Adult)   Pulse 65   Ht 5' 2" (1 575 m)   Wt 68 9 kg (152 lb)   BMI 27 80 kg/m²   Physical Exam   Constitutional: She is oriented to person, place, and time  She appears well-developed and well-nourished  No distress  HENT:   Head: Normocephalic and atraumatic  Neck: No JVD present  No thyromegaly present  Cardiovascular: Normal rate, regular rhythm, normal heart sounds and intact distal pulses  Exam reveals no gallop and no friction rub  No murmur heard  Pulmonary/Chest: No respiratory distress  She has no wheezes  She has no rales  Musculoskeletal: She exhibits no edema  Neurological: She is alert and oriented to person, place, and time  Skin: Skin is warm and dry  Psychiatric: She has a normal mood and affect  Her behavior is normal        Discussion/Summary:  1  Increase diltiazem CD to 240 mg daily  2   Return in 4 months

## 2020-03-18 NOTE — LETTER
March 18, 2020     Jennifer Hunter MD  [address] [address]    Patient: Sarah Lobato   YOB: 1935   Date of Visit: 3/18/2020       Dear Dr Aguilera Needles:    Thank you for referring Wojciech Jennings to me for evaluation  Below are my notes for this consultation  If you have questions, please do not hesitate to call me  I look forward to following your patient along with you  Sincerely,        Colt Gutierres MD        CC: No Recipients  Colt Gutierres MD  3/18/2020  2:12 PM  Sign at close encounter                                             Cardiology Follow Up    Sarah Lobato  1935  [id number]  [address]  [address] [facility] [address] [phone]    1  Paroxysmal atrial fibrillation (HCC)  POCT ECG   2  Benign essential hypertension  diltiazem (CARDIZEM CD) 240 mg 24 hr capsule   3  Mixed hyperlipidemia     4  Controlled type 2 diabetes mellitus without complication, with long-term current use of insulin ([facility] [address] )         Patient was 1st seen by us for new onset atrial fibrillation with RVR postop parathyroidectomy by Dr Rajan Castañeda  She is a 2nd admission for the same and at that time had a type 2 non ST elevation myocardial infarction  CT scan PE study was negative  Past medical history includes hypertension, hyperlipidemia and diabetes mellitus  05/29/2018 stress test: Negative Persantine stress test  LVEF 77%, hyperdynamic ventricle  Normal tomographic perfusion series  05/23/2018 echocardiogram: Normal left ventricular systolic function, EF 84-97%  Intermediate left ventricular diastolic function  Normal left ventricular filling pressures  Mild-to-moderate left atrial enlargement  Aortic sclerosis without stenosis  06/18/2019 lipid profile: Total cholesterol 148, triglycerides 49, HDL 55, LDL direct 79    Interval History:  Patient has no chest discomfort or shortness of breath  She denies palpitations    She denies dizziness or lightheadedness  Her blood pressure is 150/90 and it was the same at her last visit 6 months ago  She has not taken any Lasix over the last 6 months and has had no swelling in her legs        Patient Active Problem List   Diagnosis    Benign essential hypertension    Female bladder prolapse    Fibrocystic breast disease    Incontinence    Kyphosis    Microproteinuria    Ventricular premature depolarization    Tinnitus    Paroxysmal atrial fibrillation (HCC)    Mixed hyperlipidemia    Controlled type 2 diabetes mellitus without complication, with long-term current use of insulin (HCC)    Splenic artery aneurysm (HCC)    Thyroid nodule    Vitamin D deficiency    Vertigo    BMI 28 0-28 9,adult     Past Medical History:   Diagnosis Date    Anemia     IRON DEF ANEMIA DUE TO MENORRHAGIA    Hypercalcemia 10/06/2010    MILD  DECREASES HCTZ    Melanoma ([facility] [address] ) 11/2011    IN SITU  MID BACK    Palpitations 05/28/2018    SINUS TACHYCARDIA  TROPONIN SPLL    Skin cancer 2017    NOSE     Social History     Socioeconomic History    Marital status: /Civil Union     Spouse name: Not on file    Number of children: Not on file    Years of education: Not on file    Highest education level: Not on file   Occupational History    Not on file   Social Needs    Financial resource strain: Not on file    Food insecurity:     Worry: Not on file     Inability: Not on file    Transportation needs:     Medical: Not on file     Non-medical: Not on file   Tobacco Use    Smoking status: Never Smoker    Smokeless tobacco: Never Used   Substance and Sexual Activity    Alcohol use: No    Drug use: No    Sexual activity: Never   Lifestyle    Physical activity:     Days per week: Not on file     Minutes per session: Not on file    Stress: Not on file   Relationships    Social connections:     Talks on phone: Not on file     Gets together: Not on file     Attends Caodaism service: Not on file     Active member of club or organization: Not on file     Attends meetings of clubs or organizations: Not on file     Relationship status: Not on file    Intimate partner violence:     Fear of current or ex partner: Not on file     Emotionally abused: Not on file     Physically abused: Not on file     Forced sexual activity: Not on file   Other Topics Concern    Not on file   Social History Narrative    Not on file      Family History   Problem Relation Age of Onset    Other Mother         TESTED(-)    Colon cancer Father     Prostate cancer Father     Factor V Leiden deficiency Daughter     Factor V Leiden deficiency Daughter      Past Surgical History:   Procedure Laterality Date    GALLBLADDER [address] OTHER SURGICAL HISTORY  11/2011    EXCISION MELANOMA IN SITU MID BACK    PARATHYROIDECTOMY  05/21/2018       Current Outpatient Medications:     atenolol (TENORMIN) 50 mg tablet, Take 1 tablet (50 mg total) by mouth 2 (two) times a day, Disp: 180 tablet, Rfl: 3    BD PEN NEEDLE RIZWAN U/F 32G X 4 MM MISC, by Other route daily, Disp: 100 each, Rfl: 3    calcium carbonate-vitamin D (OSCAL-D) 500 mg-200 units per tablet, Take 1 tablet by mouth daily with breakfast, Disp: 200 tablet, Rfl: 3    glucose blood (Accu-Chek SmartView) test strip, TEST BLOOD SUGAR ONCE A DAY, Disp: 100 each, Rfl: 1    hydrochlorothiazide (HYDRODIURIL) 25 mg tablet, Take 1 tablet (25 mg total) by mouth daily, Disp: 90 tablet, Rfl: 3    insulin detemir (LEVEMIR FLEXTOUCH) 100 Units/mL injection pen, Inject 14 Units under the skin daily at bedtime, Disp: 5 pen, Rfl: 3    MICROLET LANCETS MISC, by Does not apply route daily, Disp: 50 each, Rfl: 3    repaglinide (PRANDIN) 0 5 mg tablet, Take 1 tablet (0 5 mg total) by mouth 3 (three) times a day before meals, Disp: 270 tablet, Rfl: 3    rivaroxaban (XARELTO) 20 mg tablet, Take 1 tablet (20 mg total) by mouth every 24 hours, Disp: 90 tablet, Rfl: 3    simvastatin (ZOCOR) 20 mg tablet, Take 1 tablet (20 mg total) by mouth daily, Disp: 90 tablet, Rfl: 3    diltiazem (CARDIZEM CD) 240 mg 24 hr capsule, Take 1 capsule (240 mg total) by mouth daily, Disp: 90 capsule, Rfl: 3  Allergies   Allergen Reactions    No Active Allergies        Results for orders placed or performed in visit on 03/18/20   POCT ECG    Narrative    Normal sinus rhythm with 1st degree a the block at a rate of 65 beats per minute    Minor nonspecific ST abnormality is abnormal EKG         Lab Results   Component Value Date    CHOLESTEROL 152 12/30/2019    CHOLESTEROL 148 06/18/2019    CHOLESTEROL 143 12/03/2018     Lab Results   Component Value Date    HDL 63 12/30/2019    HDL 55 06/18/2019    HDL 65 12/03/2018     Lab Results   Component Value Date    TRIG 50 12/30/2019    TRIG 49 06/18/2019    TRIG 51 12/03/2018     No results found for: [place]  Lab Results   Component Value Date    SODIUM 143 12/30/2019    K 3 6 12/30/2019     12/30/2019    CO2 31 12/30/2019    BUN 18 12/30/2019    CREATININE 0 85 12/30/2019    GLUC 151 (H) 06/18/2019    CALCIUM 9 8 12/30/2019     Lab Results   Component Value Date     05/29/2018    SODIUM 143 12/30/2019    K 3 6 12/30/2019     12/30/2019    CO2 31 12/30/2019    ANIONGAP 7 05/29/2018    AGAP 5 12/30/2019    BUN 18 12/30/2019    CREATININE 0 85 12/30/2019    GLUC 151 (H) 06/18/2019    GLUF 120 (H) 12/30/2019    CALCIUM 9 8 12/30/2019    AST 20 12/30/2019    ALT 29 12/30/2019    ALKPHOS 94 12/30/2019    PROT 7 1 05/28/2018    TP 7 8 12/30/2019    BILITOT 0 4 05/28/2018    TBILI 0 53 12/30/2019    EGFR 64 12/30/2019     Lab Results   Component Value Date    WBC 10 90 (H) 01/16/2019    HGB 13 6 01/16/2019    HCT 42 6 01/16/2019    MCV 93 01/16/2019     01/16/2019     Lab Results   Component Value Date    OSG3ZHWPRNNC 1 610 01/16/2019         Review of Systems:  Review of Systems Respiratory: Negative for cough, choking, chest tightness, shortness of breath and wheezing  Cardiovascular: Negative for chest pain, palpitations and leg swelling  Musculoskeletal: Negative for gait problem  Skin: Negative for rash  Neurological: Negative for dizziness, tremors, syncope, weakness, light-headedness, numbness and headaches  Psychiatric/Behavioral: Negative for agitation and behavioral problems  The patient is not hyperactive  Physical Exam:  /90 (BP Location: Right arm, Patient Position: Sitting, Cuff Size: Adult)   Pulse 65   Ht 5' 2" (1 575 m)   Wt 68 9 kg (152 lb)   BMI 27 80 kg/m²    Physical Exam   Constitutional: She is oriented to person, place, and time  She appears well-developed and well-nourished  No distress  HENT:   Head: Normocephalic and atraumatic  Neck: No JVD present  No thyromegaly present  Cardiovascular: Normal rate, regular rhythm, normal heart sounds and intact distal pulses  Exam reveals no gallop and no friction rub  No murmur heard  Pulmonary/Chest: No respiratory distress  She has no wheezes  She has no rales  Musculoskeletal: She exhibits no edema  Neurological: She is alert and oriented to person, place, and time  Skin: Skin is warm and dry  Psychiatric: She has a normal mood and affect  Her behavior is normal        Discussion/Summary:  1  Increase diltiazem CD to 240 mg daily  2   Return in 4 months

## 2020-03-27 PROBLEM — E11.22 TYPE 2 DIABETES MELLITUS WITH CHRONIC KIDNEY DISEASE, WITH LONG-TERM CURRENT USE OF INSULIN (HCC): Status: ACTIVE | Noted: 2018-08-22

## 2020-03-30 DIAGNOSIS — E11.9 TYPE 2 DIABETES MELLITUS WITHOUT COMPLICATION, UNSPECIFIED WHETHER LONG TERM INSULIN USE (HCC): ICD-10-CM

## 2020-03-30 RX ORDER — PEN NEEDLE, DIABETIC 32GX 5/32"
NEEDLE, DISPOSABLE MISCELLANEOUS DAILY
Qty: 100 EACH | Refills: 3 | Status: SHIPPED | OUTPATIENT
Start: 2020-03-30 | End: 2021-03-18

## 2020-04-03 ENCOUNTER — TELEPHONE (OUTPATIENT)
Dept: FAMILY MEDICINE CLINIC | Facility: CLINIC | Age: 85
End: 2020-04-03

## 2020-04-06 ENCOUNTER — TELEMEDICINE (OUTPATIENT)
Dept: FAMILY MEDICINE CLINIC | Facility: CLINIC | Age: 85
End: 2020-04-06
Payer: COMMERCIAL

## 2020-04-06 DIAGNOSIS — N18.1 TYPE 2 DIABETES MELLITUS WITH STAGE 1 CHRONIC KIDNEY DISEASE, WITH LONG-TERM CURRENT USE OF INSULIN (HCC): ICD-10-CM

## 2020-04-06 DIAGNOSIS — Z79.4 TYPE 2 DIABETES MELLITUS WITH STAGE 1 CHRONIC KIDNEY DISEASE, WITH LONG-TERM CURRENT USE OF INSULIN (HCC): ICD-10-CM

## 2020-04-06 DIAGNOSIS — E78.2 MIXED HYPERLIPIDEMIA: ICD-10-CM

## 2020-04-06 DIAGNOSIS — I10 BENIGN ESSENTIAL HYPERTENSION: Primary | ICD-10-CM

## 2020-04-06 DIAGNOSIS — E11.22 TYPE 2 DIABETES MELLITUS WITH STAGE 1 CHRONIC KIDNEY DISEASE, WITH LONG-TERM CURRENT USE OF INSULIN (HCC): ICD-10-CM

## 2020-04-06 PROCEDURE — 99214 OFFICE O/P EST MOD 30 MIN: CPT | Performed by: FAMILY MEDICINE

## 2020-04-06 PROCEDURE — 1160F RVW MEDS BY RX/DR IN RCRD: CPT | Performed by: FAMILY MEDICINE

## 2020-04-13 ENCOUNTER — CLINICAL SUPPORT (OUTPATIENT)
Dept: FAMILY MEDICINE CLINIC | Facility: CLINIC | Age: 85
End: 2020-04-13
Payer: COMMERCIAL

## 2020-04-13 ENCOUNTER — TELEPHONE (OUTPATIENT)
Dept: CARDIOLOGY CLINIC | Facility: CLINIC | Age: 85
End: 2020-04-13

## 2020-04-13 DIAGNOSIS — Z79.4 TYPE 2 DIABETES MELLITUS WITH STAGE 1 CHRONIC KIDNEY DISEASE, WITH LONG-TERM CURRENT USE OF INSULIN (HCC): ICD-10-CM

## 2020-04-13 DIAGNOSIS — N18.1 TYPE 2 DIABETES MELLITUS WITH STAGE 1 CHRONIC KIDNEY DISEASE, WITH LONG-TERM CURRENT USE OF INSULIN (HCC): ICD-10-CM

## 2020-04-13 DIAGNOSIS — R00.2 PALPITATIONS: Primary | ICD-10-CM

## 2020-04-13 DIAGNOSIS — I10 BENIGN ESSENTIAL HYPERTENSION: ICD-10-CM

## 2020-04-13 DIAGNOSIS — E11.22 TYPE 2 DIABETES MELLITUS WITH STAGE 1 CHRONIC KIDNEY DISEASE, WITH LONG-TERM CURRENT USE OF INSULIN (HCC): ICD-10-CM

## 2020-04-13 LAB
ANION GAP SERPL CALCULATED.3IONS-SCNC: 9 MMOL/L (ref 4–13)
BUN SERPL-MCNC: 16 MG/DL (ref 5–25)
CALCIUM SERPL-MCNC: 9.8 MG/DL (ref 8.3–10.1)
CHLORIDE SERPL-SCNC: 101 MMOL/L (ref 100–108)
CO2 SERPL-SCNC: 27 MMOL/L (ref 21–32)
CREAT SERPL-MCNC: 0.96 MG/DL (ref 0.6–1.3)
GFR SERPL CREATININE-BSD FRML MDRD: 55 ML/MIN/1.73SQ M
GLUCOSE SERPL-MCNC: 278 MG/DL (ref 65–140)
POTASSIUM SERPL-SCNC: 3.9 MMOL/L (ref 3.5–5.3)
SODIUM SERPL-SCNC: 137 MMOL/L (ref 136–145)

## 2020-04-13 PROCEDURE — 36415 COLL VENOUS BLD VENIPUNCTURE: CPT

## 2020-04-13 PROCEDURE — 80048 BASIC METABOLIC PNL TOTAL CA: CPT | Performed by: FAMILY MEDICINE

## 2020-04-16 ENCOUNTER — TELEPHONE (OUTPATIENT)
Dept: FAMILY MEDICINE CLINIC | Facility: CLINIC | Age: 85
End: 2020-04-16

## 2020-05-14 ENCOUNTER — CLINICAL SUPPORT (OUTPATIENT)
Dept: CARDIOLOGY CLINIC | Facility: CLINIC | Age: 85
End: 2020-05-14
Payer: COMMERCIAL

## 2020-05-14 DIAGNOSIS — R00.2 PALPITATIONS: ICD-10-CM

## 2020-05-14 PROCEDURE — 0298T PR EXT ECG > 48HR TO 21 DAY REVIEW AND INTERPRETATN: CPT | Performed by: INTERNAL MEDICINE

## 2020-05-15 ENCOUNTER — TELEPHONE (OUTPATIENT)
Dept: CARDIOLOGY CLINIC | Facility: CLINIC | Age: 85
End: 2020-05-15

## 2020-05-20 ENCOUNTER — OFFICE VISIT (OUTPATIENT)
Dept: CARDIOLOGY CLINIC | Facility: CLINIC | Age: 85
End: 2020-05-20
Payer: COMMERCIAL

## 2020-05-20 ENCOUNTER — TELEPHONE (OUTPATIENT)
Dept: CARDIOLOGY CLINIC | Facility: CLINIC | Age: 85
End: 2020-05-20

## 2020-05-20 VITALS
BODY MASS INDEX: 27.97 KG/M2 | WEIGHT: 152 LBS | HEIGHT: 62 IN | DIASTOLIC BLOOD PRESSURE: 84 MMHG | HEART RATE: 79 BPM | SYSTOLIC BLOOD PRESSURE: 178 MMHG

## 2020-05-20 DIAGNOSIS — I48.0 PAROXYSMAL ATRIAL FIBRILLATION (HCC): Primary | ICD-10-CM

## 2020-05-20 DIAGNOSIS — E78.2 MIXED HYPERLIPIDEMIA: ICD-10-CM

## 2020-05-20 DIAGNOSIS — I49.3 VENTRICULAR PREMATURE DEPOLARIZATION: ICD-10-CM

## 2020-05-20 DIAGNOSIS — I10 BENIGN ESSENTIAL HYPERTENSION: ICD-10-CM

## 2020-05-20 PROCEDURE — 3008F BODY MASS INDEX DOCD: CPT | Performed by: INTERNAL MEDICINE

## 2020-05-20 PROCEDURE — 3079F DIAST BP 80-89 MM HG: CPT | Performed by: INTERNAL MEDICINE

## 2020-05-20 PROCEDURE — 4040F PNEUMOC VAC/ADMIN/RCVD: CPT | Performed by: INTERNAL MEDICINE

## 2020-05-20 PROCEDURE — 99214 OFFICE O/P EST MOD 30 MIN: CPT | Performed by: INTERNAL MEDICINE

## 2020-05-20 PROCEDURE — 1036F TOBACCO NON-USER: CPT | Performed by: INTERNAL MEDICINE

## 2020-05-20 PROCEDURE — 2022F DILAT RTA XM EVC RTNOPTHY: CPT | Performed by: INTERNAL MEDICINE

## 2020-05-20 PROCEDURE — 1160F RVW MEDS BY RX/DR IN RCRD: CPT | Performed by: INTERNAL MEDICINE

## 2020-05-20 PROCEDURE — 3077F SYST BP >= 140 MM HG: CPT | Performed by: INTERNAL MEDICINE

## 2020-05-20 PROCEDURE — 3066F NEPHROPATHY DOC TX: CPT | Performed by: INTERNAL MEDICINE

## 2020-05-20 RX ORDER — DILTIAZEM HYDROCHLORIDE 120 MG/1
120 CAPSULE, COATED, EXTENDED RELEASE ORAL DAILY
Qty: 30 CAPSULE | Refills: 3 | Status: SHIPPED | OUTPATIENT
Start: 2020-05-20 | End: 2020-09-09 | Stop reason: SDUPTHER

## 2020-07-06 DIAGNOSIS — E78.49 OTHER HYPERLIPIDEMIA: ICD-10-CM

## 2020-07-06 RX ORDER — SIMVASTATIN 20 MG
20 TABLET ORAL DAILY
Qty: 90 TABLET | Refills: 3 | Status: SHIPPED | OUTPATIENT
Start: 2020-07-06 | End: 2021-07-06 | Stop reason: SDUPTHER

## 2020-07-20 DIAGNOSIS — I72.8 SPLENIC ARTERY ANEURYSM (HCC): Primary | ICD-10-CM

## 2020-07-31 ENCOUNTER — CLINICAL SUPPORT (OUTPATIENT)
Dept: FAMILY MEDICINE CLINIC | Facility: CLINIC | Age: 85
End: 2020-07-31
Payer: COMMERCIAL

## 2020-07-31 DIAGNOSIS — N18.1 TYPE 2 DIABETES MELLITUS WITH STAGE 1 CHRONIC KIDNEY DISEASE, WITH LONG-TERM CURRENT USE OF INSULIN (HCC): ICD-10-CM

## 2020-07-31 DIAGNOSIS — E11.22 TYPE 2 DIABETES MELLITUS WITH STAGE 1 CHRONIC KIDNEY DISEASE, WITH LONG-TERM CURRENT USE OF INSULIN (HCC): ICD-10-CM

## 2020-07-31 DIAGNOSIS — I10 BENIGN ESSENTIAL HYPERTENSION: ICD-10-CM

## 2020-07-31 DIAGNOSIS — E78.2 MIXED HYPERLIPIDEMIA: ICD-10-CM

## 2020-07-31 DIAGNOSIS — Z79.4 TYPE 2 DIABETES MELLITUS WITH STAGE 1 CHRONIC KIDNEY DISEASE, WITH LONG-TERM CURRENT USE OF INSULIN (HCC): ICD-10-CM

## 2020-07-31 LAB
ALBUMIN SERPL BCP-MCNC: 3.5 G/DL (ref 3.5–5)
ALP SERPL-CCNC: 87 U/L (ref 46–116)
ALT SERPL W P-5'-P-CCNC: 24 U/L (ref 12–78)
ANION GAP SERPL CALCULATED.3IONS-SCNC: 9 MMOL/L (ref 4–13)
AST SERPL W P-5'-P-CCNC: 20 U/L (ref 5–45)
BILIRUB SERPL-MCNC: 0.52 MG/DL (ref 0.2–1)
BUN SERPL-MCNC: 16 MG/DL (ref 5–25)
CALCIUM ALBUM COR SERPL-MCNC: 10.9 MG/DL (ref 8.3–10.1)
CALCIUM SERPL-MCNC: 10.5 MG/DL (ref 8.3–10.1)
CHLORIDE SERPL-SCNC: 104 MMOL/L (ref 100–108)
CHOLEST SERPL-MCNC: 134 MG/DL (ref 50–200)
CO2 SERPL-SCNC: 28 MMOL/L (ref 21–32)
CREAT SERPL-MCNC: 0.93 MG/DL (ref 0.6–1.3)
EST. AVERAGE GLUCOSE BLD GHB EST-MCNC: 183 MG/DL
GFR SERPL CREATININE-BSD FRML MDRD: 57 ML/MIN/1.73SQ M
GLUCOSE P FAST SERPL-MCNC: 80 MG/DL (ref 65–99)
HBA1C MFR BLD: 8 %
HDLC SERPL-MCNC: 61 MG/DL
LDLC SERPL CALC-MCNC: 64 MG/DL (ref 0–100)
POTASSIUM SERPL-SCNC: 3.5 MMOL/L (ref 3.5–5.3)
PROT SERPL-MCNC: 7.8 G/DL (ref 6.4–8.2)
SODIUM SERPL-SCNC: 141 MMOL/L (ref 136–145)
TRIGL SERPL-MCNC: 46 MG/DL

## 2020-07-31 PROCEDURE — 83036 HEMOGLOBIN GLYCOSYLATED A1C: CPT | Performed by: FAMILY MEDICINE

## 2020-07-31 PROCEDURE — 80053 COMPREHEN METABOLIC PANEL: CPT | Performed by: FAMILY MEDICINE

## 2020-07-31 PROCEDURE — 80061 LIPID PANEL: CPT | Performed by: FAMILY MEDICINE

## 2020-07-31 PROCEDURE — 36415 COLL VENOUS BLD VENIPUNCTURE: CPT

## 2020-08-02 PROBLEM — E78.2 MIXED HYPERLIPIDEMIA: Status: RESOLVED | Noted: 2018-08-22 | Resolved: 2020-08-02

## 2020-08-02 NOTE — ASSESSMENT & PLAN NOTE
7/31/20: FBS=80  Last HbA1c = 7 6% (12/30/19) and 8 2% (6/18/19)    Lab Results   Component Value Date    HGBA1C 8 0 (H) 07/31/2020

## 2020-08-10 ENCOUNTER — OFFICE VISIT (OUTPATIENT)
Dept: FAMILY MEDICINE CLINIC | Facility: CLINIC | Age: 85
End: 2020-08-10
Payer: COMMERCIAL

## 2020-08-10 VITALS
TEMPERATURE: 98.1 F | BODY MASS INDEX: 27 KG/M2 | OXYGEN SATURATION: 98 % | WEIGHT: 152.4 LBS | HEART RATE: 84 BPM | HEIGHT: 63 IN | SYSTOLIC BLOOD PRESSURE: 156 MMHG | RESPIRATION RATE: 16 BRPM | DIASTOLIC BLOOD PRESSURE: 100 MMHG

## 2020-08-10 DIAGNOSIS — E11.22 TYPE 2 DIABETES MELLITUS WITH STAGE 1 CHRONIC KIDNEY DISEASE, WITH LONG-TERM CURRENT USE OF INSULIN (HCC): Primary | Chronic | ICD-10-CM

## 2020-08-10 DIAGNOSIS — Z79.4 CONTROLLED TYPE 2 DIABETES MELLITUS WITHOUT COMPLICATION, WITH LONG-TERM CURRENT USE OF INSULIN (HCC): ICD-10-CM

## 2020-08-10 DIAGNOSIS — I72.8 SPLENIC ARTERY ANEURYSM (HCC): ICD-10-CM

## 2020-08-10 DIAGNOSIS — E83.52 HYPERCALCEMIA: ICD-10-CM

## 2020-08-10 DIAGNOSIS — E55.9 VITAMIN D DEFICIENCY: ICD-10-CM

## 2020-08-10 DIAGNOSIS — E78.2 MIXED HYPERLIPIDEMIA: ICD-10-CM

## 2020-08-10 DIAGNOSIS — N18.1 TYPE 2 DIABETES MELLITUS WITH STAGE 1 CHRONIC KIDNEY DISEASE, WITH LONG-TERM CURRENT USE OF INSULIN (HCC): Primary | Chronic | ICD-10-CM

## 2020-08-10 DIAGNOSIS — E11.9 CONTROLLED TYPE 2 DIABETES MELLITUS WITHOUT COMPLICATION, WITH LONG-TERM CURRENT USE OF INSULIN (HCC): ICD-10-CM

## 2020-08-10 DIAGNOSIS — I10 BENIGN ESSENTIAL HYPERTENSION: ICD-10-CM

## 2020-08-10 DIAGNOSIS — Z79.4 TYPE 2 DIABETES MELLITUS WITH STAGE 1 CHRONIC KIDNEY DISEASE, WITH LONG-TERM CURRENT USE OF INSULIN (HCC): Primary | Chronic | ICD-10-CM

## 2020-08-10 DIAGNOSIS — R80.9 MICROPROTEINURIA: ICD-10-CM

## 2020-08-10 PROCEDURE — 1160F RVW MEDS BY RX/DR IN RCRD: CPT | Performed by: FAMILY MEDICINE

## 2020-08-10 PROCEDURE — 3080F DIAST BP >= 90 MM HG: CPT | Performed by: FAMILY MEDICINE

## 2020-08-10 PROCEDURE — 3052F HG A1C>EQUAL 8.0%<EQUAL 9.0%: CPT | Performed by: FAMILY MEDICINE

## 2020-08-10 PROCEDURE — 1125F AMNT PAIN NOTED PAIN PRSNT: CPT | Performed by: FAMILY MEDICINE

## 2020-08-10 PROCEDURE — 4040F PNEUMOC VAC/ADMIN/RCVD: CPT | Performed by: FAMILY MEDICINE

## 2020-08-10 PROCEDURE — 4010F ACE/ARB THERAPY RXD/TAKEN: CPT | Performed by: FAMILY MEDICINE

## 2020-08-10 PROCEDURE — 1170F FXNL STATUS ASSESSED: CPT | Performed by: FAMILY MEDICINE

## 2020-08-10 PROCEDURE — 3288F FALL RISK ASSESSMENT DOCD: CPT | Performed by: FAMILY MEDICINE

## 2020-08-10 PROCEDURE — G0438 PPPS, INITIAL VISIT: HCPCS | Performed by: FAMILY MEDICINE

## 2020-08-10 PROCEDURE — 3066F NEPHROPATHY DOC TX: CPT | Performed by: FAMILY MEDICINE

## 2020-08-10 PROCEDURE — 1036F TOBACCO NON-USER: CPT | Performed by: FAMILY MEDICINE

## 2020-08-10 PROCEDURE — 1101F PT FALLS ASSESS-DOCD LE1/YR: CPT | Performed by: FAMILY MEDICINE

## 2020-08-10 PROCEDURE — 3008F BODY MASS INDEX DOCD: CPT | Performed by: FAMILY MEDICINE

## 2020-08-10 PROCEDURE — 99214 OFFICE O/P EST MOD 30 MIN: CPT | Performed by: FAMILY MEDICINE

## 2020-08-10 PROCEDURE — 2022F DILAT RTA XM EVC RTNOPTHY: CPT | Performed by: FAMILY MEDICINE

## 2020-08-10 PROCEDURE — 3077F SYST BP >= 140 MM HG: CPT | Performed by: FAMILY MEDICINE

## 2020-08-10 RX ORDER — INSULIN DETEMIR 100 [IU]/ML
14 INJECTION, SOLUTION SUBCUTANEOUS
Qty: 5 PEN | Refills: 3 | Status: SHIPPED | OUTPATIENT
Start: 2020-08-10 | End: 2021-02-03 | Stop reason: DRUGHIGH

## 2020-08-10 RX ORDER — LISINOPRIL 10 MG/1
10 TABLET ORAL DAILY
Qty: 90 TABLET | Refills: 3 | Status: SHIPPED | OUTPATIENT
Start: 2020-08-10 | End: 2020-09-14 | Stop reason: DRUGHIGH

## 2020-08-10 RX ORDER — ATENOLOL 50 MG/1
50 TABLET ORAL 2 TIMES DAILY
Qty: 180 TABLET | Refills: 3 | Status: SHIPPED | OUTPATIENT
Start: 2020-08-10 | End: 2021-08-12 | Stop reason: SDUPTHER

## 2020-08-10 NOTE — PROGRESS NOTES
Assessment and Plan:     Problem List Items Addressed This Visit     None           Preventive health issues were discussed with patient, and age appropriate screening tests were ordered as noted in patient's After Visit Summary  Personalized health advice and appropriate referrals for health education or preventive services given if needed, as noted in patient's After Visit Summary       History of Present Illness:     Patient presents for Medicare Annual Wellness visit    Patient Care Team:  Macho Becker MD as PCP - General (Family Medicine)     Problem List:     Patient Active Problem List   Diagnosis    Benign essential hypertension    Female bladder prolapse    Fibrocystic breast disease    Hypercalcemia    Incontinence    Kyphosis    Microproteinuria    Ventricular premature depolarization    Tinnitus    Paroxysmal atrial fibrillation (HCC)    Mixed hyperlipidemia    Type 2 diabetes mellitus with chronic kidney disease, with long-term current use of insulin ([facility] [address] )    Splenic artery aneurysm ([facility] [address] )    Thyroid nodule    Vitamin D deficiency    Vertigo    BMI 28 0-28 9,adult      Past Medical and Surgical History:     Past Medical History:   Diagnosis Date    Anemia     IRON DEF ANEMIA DUE TO MENORRHAGIA    Diabetes mellitus ([address] )     Hypercalcemia 10/06/2010    MILD  DECREASES HCTZ    Hypertension     Melanoma ([address] ) 11/2011    IN SITU  MID BACK    Palpitations 05/28/2018    SINUS TACHYCARDIA  TROPONIN SPLL    Skin cancer 2017    NOSE     Past Surgical History:   Procedure Laterality Date    GALLBLADDER SURGERY  1991    HYSTERECTOMY  1995    UTERINE PROLAPSE VAGINAL POLYPS    HYSTERECTOMY      IRON REPLACEMENT    OTHER SURGICAL HISTORY  11/2011    EXCISION MELANOMA IN SITU MID BACK    PARATHYROIDECTOMY  05/21/2018      Family History:     Family History   Problem Relation Age of Onset    Other Mother         TESTED(-)    Colon cancer Father     Prostate cancer Father     Factor V Leiden deficiency Daughter     Factor V Leiden deficiency Daughter       Social History:     E-Cigarette/Vaping    E-Cigarette Use Never User      E-Cigarette/Vaping Substances    Nicotine No     THC No     CBD No     Flavoring No      Social History     Socioeconomic History    Marital status: /Civil Union     Spouse name: None    Number of children: None    Years of education: None    Highest education level: None   Occupational History    None   Social Needs    Financial resource strain: None    Food insecurity     Worry: None     Inability: None    Transportation needs     Medical: None     Non-medical: None   Tobacco Use    Smoking status: Never Smoker    Smokeless tobacco: Never Used   Substance and Sexual Activity    Alcohol use: No    Drug use: No    Sexual activity: Never   Lifestyle    Physical activity     Days per week: None     Minutes per session: None    Stress: None   Relationships    Social connections     Talks on phone: None     Gets together: None     Attends Rastafari service: None     Active member of club or organization: None     Attends meetings of clubs or organizations: None     Relationship status: None    Intimate partner violence     Fear of current or ex partner: None     Emotionally abused: None     Physically abused: None     Forced sexual activity: None   Other Topics Concern    None   Social History Narrative    None      Medications and Allergies:     Current Outpatient Medications   Medication Sig Dispense Refill    atenolol (TENORMIN) 50 mg tablet Take 1 tablet (50 mg total) by mouth 2 (two) times a day 180 tablet 3    BD PEN NEEDLE RIZWAN U/F 32G X 4 MM MISC by Other route daily 100 each 3    calcium carbonate-vitamin D (OSCAL-D) 500 mg-200 units per tablet Take 1 tablet by mouth daily with breakfast 200 tablet 3    diltiazem (CARDIZEM CD) 120 mg 24 hr capsule Take 1 capsule (120 mg total) by mouth daily 30 capsule 3    glucose blood (Accu-Chek SmartView) test strip TEST BLOOD SUGAR ONCE A  each 1    hydrochlorothiazide (HYDRODIURIL) 25 mg tablet Take 1 tablet (25 mg total) by mouth daily 90 tablet 3    insulin detemir (LEVEMIR FLEXTOUCH) 100 Units/mL injection pen Inject 14 Units under the skin daily at bedtime 5 pen 3    MICROLET LANCETS MISC by Does not apply route daily 50 each 3    repaglinide (PRANDIN) 0 5 mg tablet Take 1 tablet (0 5 mg total) by mouth 3 (three) times a day before meals 270 tablet 3    rivaroxaban (XARELTO) 20 mg tablet Take 1 tablet (20 mg total) by mouth every 24 hours 90 tablet 3    simvastatin (ZOCOR) 20 mg tablet Take 1 tablet (20 mg total) by mouth daily 90 tablet 3     No current facility-administered medications for this visit        Allergies   Allergen Reactions    No Active Allergies       Immunizations:     Immunization History   Administered Date(s) Administered    H1N1, All Formulations 12/22/2009    INFLUENZA 10/12/2007, 10/16/2008, 10/15/2009, 09/23/2015, 11/16/2016, 11/16/2016, 10/04/2017    Influenza Split 10/06/2010, 10/11/2011, 10/27/2012, 10/08/2013    Influenza Split High Dose Preservative Free IM 09/25/2014, 10/04/2017    Influenza TIV (IM) 10/16/2008, 10/15/2009, 09/23/2015    Influenza, high dose seasonal 0 5 mL 10/15/2018, 10/22/2019    Pneumococcal Conjugate 13-Valent 03/30/2015    Pneumococcal Polysaccharide PPV23 12/05/1997, 10/06/2010    Td (adult), adsorbed 04/12/1984, 07/18/2011    Zoster 04/08/2009, 04/08/2009    Zoster Vaccine Recombinant 06/25/2019, 09/30/2019      Health Maintenance:         Topic Date Due    DXA SCAN  02/19/2020         Topic Date Due    Influenza Vaccine  07/01/2020      Medicare Health Risk Assessment:     /98 (BP Location: Left arm, Patient Position: Sitting, Cuff Size: Standard)   Pulse 84   Temp 98 1 °F (36 7 °C) (Temporal)   Resp 16   Ht 5' 3" (1 6 m)   Wt 69 1 kg (152 lb 6 4 oz)   SpO2 98%   BMI 27 00 kg/m²      Jennie Michael is here for her Initial Wellness visit  Health Risk Assessment:   Patient rates overall health as good  Patient feels that their physical health rating is same  Eyesight was rated as slightly worse  Hearing was rated as same  Patient feels that their emotional and mental health rating is same  Pain experienced in the last 7 days has been none  Patient states that she has experienced no weight loss or gain in last 6 months  Fall Risk Screening: In the past year, patient has experienced: no history of falling in past year      Urinary Incontinence Screening:   Patient has leaked urine accidently in the last six months  Home Safety:  Patient does not have trouble with stairs inside or outside of their home  Patient has working smoke alarms and has working carbon monoxide detector  Home safety hazards include: loose rugs on the floor  Loss rugs in bathroom  Nutrition:   Current diet is Low Carb  Medications:   Patient is not currently taking any over-the-counter supplements  Patient is able to manage medications  Activities of Daily Living (ADLs)/Instrumental Activities of Daily Living (IADLs):   Walk and transfer into and out of bed and chair?: Yes  Dress and groom yourself?: Yes    Bathe or shower yourself?: Yes    Feed yourself? Yes  Do your laundry/housekeeping?: Yes  Manage your money, pay your bills and track your expenses?: Yes  Make your own meals?: Yes    Do your own shopping?: Yes    Previous Hospitalizations:   Any hospitalizations or ED visits within the last 12 months?: No      Advance Care Planning:   Living will: Yes    Durable POA for healthcare: Yes    Advanced directive: Yes    Five wishes given: Yes      Comments: Patient has a Living Will and agrees to supply us with a copy for scanning into the chart      Cognitive Screening:   Provider or family/friend/caregiver concerned regarding cognition?: No    PREVENTIVE SCREENINGS      Cardiovascular Screening:    General: Screening Not Indicated and History Lipid Disorder      Diabetes Screening:     General: Screening Not Indicated and History Diabetes      Colorectal Cancer Screening:     General: Screening Current      Breast Cancer Screening:     General: Screening Current      Cervical Cancer Screening:    General: Screening Not Indicated      Osteoporosis Screening:    General: Risks and Benefits Discussed and Screening Current      Abdominal Aortic Aneurysm (AAA) Screening:        General: Screening Not Indicated      Lung Cancer Screening:     General: Screening Not Indicated      Hepatitis C Screening:    General: Screening Not Indicated    Hep C Screening Accepted: Yes        Preventive Screening Comments: Her mammogram and bone density test for 2020 got delayed because of the COVID pandemic  It is now scheduled for 09/2020  HCV screening not indicated at age 82  Other Counseling Topics:   Alcohol use counseling, car/seat belt/driving safety, sunscreen and calcium and vitamin D intake and regular weightbearing exercise         Enoch Laguna MD

## 2020-08-10 NOTE — PROGRESS NOTES
Assessment/Plan:    Type 2 diabetes mellitus with chronic kidney disease, with long-term current use of insulin ([facility])    Lab Results   Component Value Date    HGBA1C 8 0 (H) 07/31/2020 7/31/20: FBS=80  Last HbA1c: 7 6%(12/30/19); 8 2%96/18/19)  Medications reconciled today  Sometimes she misses doses of the repaglinide  In the past could not tolerate higher doses of repaglinide  Over the last 5 weeks fasting blood sugar has ranged between 110 and 140  There have been some out wires and the lowest blood sugar was 90  No hypoglycemia  Her fasting blood sugars are good enough that I do not want to increase the Levemir and risk hypoglycemia in the morning  The hydrochlorothiazide is a medication that can raise blood sugars as well  Stop HCTZ and start lisinopril -- continue HBPM     Benign essential hypertension  12/31/20: CMP=WNL  HBPM: SBP = 120s - 140s and the DBP = 60s & 70s  Blood pressure is controlled on present treatment regimen  Patient misses no doses and tolerates the meds without side effects  Patient avoids salt  Discussed diet, exercise and weight control  No change in present meds  Continue HBPM   Because of the borderline systolic blood pressure control, and hypercalcemia, and the postprandial hyperglycemia, were going to discontinue the hydrochlorothiazide and substitute lisinopril 10 milligrams daily  Mixed hyperlipidemia  12/30/19: HDL/LDL/TG = 63/79/50  On simvastatin 20 mg p o  Daily  07/31/20: HDL/LDL/TG = 61/64/46  Continue simvastatin at 20 milligrams orally once a day  Hypercalcemia  Recurrence of problem Serum calcium levels were WNL at 8 9 to 9 2, since the parathyroidectomy on 5/21/18, by Dr Fouzia Huerta  12/31/20: corrected serum calcium elevated to 10 9  We are discontinuing the hydrochlorothiazide and also will be rechecking calcium and vitamin-D levels as well as a PTH      Splenic artery aneurysm ([address] )  Followed by the vascular specialist   Was scheduled for repeat ultrasound of the splenic artery in 05/2020 but it got delayed by the coronavirus pandemic  The repeat ultrasound is now scheduled for 09/2020  Diagnoses and all orders for this visit:    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, with long-term current use of insulin (HCC)  -     Microalbumin / creatinine urine ratio    Splenic artery aneurysm (HCC)    Benign essential hypertension  -     lisinopril (ZESTRIL) 10 mg tablet; Take 1 tablet (10 mg total) by mouth daily    Vitamin D deficiency  -     Vitamin D 25 hydroxy; Future    Mixed hyperlipidemia    Hypercalcemia  -     PTH, intact; Future  -     Calcium; Future  -     Calcium, ionized; Future    Microproteinuria          Subjective:     Chief Complaint   Patient presents with    Medicare Wellness Visit        Patient ID: Cherelle Isabel is a 82 y o  female  HPI :  Medicare annual wellness visit and chronic medical problems  The following portions of the patient's history were reviewed and updated as appropriate: allergies, current medications, past family history, past medical history, past social history, past surgical history and problem list     Review of Systems   Constitutional: Negative for activity change, appetite change, fatigue, fever and unexpected weight change  HENT: Negative for congestion, dental problem and sneezing  Eyes: Negative for discharge and visual disturbance  Respiratory: Negative for cough, chest tightness, shortness of breath and wheezing  Cardiovascular: Negative for chest pain, palpitations and leg swelling  Gastrointestinal: Negative for abdominal pain, constipation, diarrhea, nausea and vomiting  Endocrine: Negative for polydipsia and polyuria  Genitourinary: Negative for dysuria and frequency  Musculoskeletal: Negative for arthralgias  Skin: Negative for rash  Allergic/Immunologic: Negative for environmental allergies and food allergies     Neurological: Negative for light-headedness and headaches  Hematological: Negative for adenopathy  Psychiatric/Behavioral: Negative for behavioral problems and sleep disturbance  Objective:  Vitals:    08/10/20 1257 08/10/20 1341   BP: 152/98 156/100   BP Location: Left arm    Patient Position: Sitting    Cuff Size: Standard    Pulse: 84    Resp: 16    Temp: 98 1 °F (36 7 °C)    TempSrc: Temporal    SpO2: 98%    Weight: 69 1 kg (152 lb 6 4 oz)    Height: 5' 3" (1 6 m)       Physical Exam  Constitutional:       Appearance: Normal appearance  She is well-developed  HENT:      Head: Normocephalic  Nose: Nose normal    Eyes:      General:         Right eye: No discharge  Left eye: No discharge  Conjunctiva/sclera: Conjunctivae normal       Pupils: Pupils are equal, round, and reactive to light  Neck:      Musculoskeletal: Normal range of motion and neck supple  Thyroid: No thyromegaly  Cardiovascular:      Rate and Rhythm: Normal rate and regular rhythm  Heart sounds: Normal heart sounds  No murmur  Pulmonary:      Effort: Pulmonary effort is normal       Breath sounds: Normal breath sounds  Abdominal:      General: Bowel sounds are normal       Palpations: Abdomen is soft  Tenderness: There is no abdominal tenderness  Musculoskeletal: Normal range of motion  General: No swelling  Lymphadenopathy:      Cervical: No cervical adenopathy  Skin:     General: Skin is warm  Findings: No rash  Neurological:      General: No focal deficit present  Mental Status: She is alert and oriented to person, place, and time  Psychiatric:         Mood and Affect: Mood normal          Behavior: Behavior normal          Thought Content:  Thought content normal          Judgment: Judgment normal

## 2020-08-10 NOTE — ASSESSMENT & PLAN NOTE
Followed by the vascular specialist   Was scheduled for repeat ultrasound of the splenic artery in 05/2020 but it got delayed by the coronavirus pandemic  The repeat ultrasound is now scheduled for 09/2020

## 2020-08-10 NOTE — ASSESSMENT & PLAN NOTE
Lab Results   Component Value Date    HGBA1C 8 0 (H) 07/31/2020 7/31/20: FBS=80  Last HbA1c: 7 6%(12/30/19); 8 2%96/18/19)  Medications reconciled today  Sometimes she misses doses of the repaglinide  In the past could not tolerate higher doses of repaglinide  Over the last 5 weeks fasting blood sugar has ranged between 110 and 140  There have been some out wires and the lowest blood sugar was 90  No hypoglycemia  Her fasting blood sugars are good enough that I do not want to increase the Levemir and risk hypoglycemia in the morning  The hydrochlorothiazide is a medication that can raise blood sugars as well    Stop HCTZ and start lisinopril -- continue HBPM

## 2020-08-10 NOTE — ASSESSMENT & PLAN NOTE
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

## 2020-08-10 NOTE — PATIENT INSTRUCTIONS
Continue watching the carbohydrates and avoiding salt  Stop the hydrochlorothiazide and substitute lisinopril 10 milligrams, once a day  I need you to check your blood pressures every day and you can continue checking her fasting blood sugars the way you have  You need to call me if your systolic blood pressure is in the 160s consistently or higher and if your diastolic blood pressure is in the 90s consistently or higher  That might mean that we have to increase the lisinopril  Medicare Preventive Visit Patient Instructions  Thank you for completing your Welcome to Medicare Visit or Medicare Annual Wellness Visit today  Your next wellness visit will be due in one year (8/10/2021)  The screening/preventive services that you may require over the next 5-10 years are detailed below  Some tests may not apply to you based off risk factors and/or age  Screening tests ordered at today's visit but not completed yet may show as past due  Also, please note that scanned in results may not display below  Preventive Screenings:  Service Recommendations Previous Testing/Comments   Colorectal Cancer Screening  * Colonoscopy    * Fecal Occult Blood Test (FOBT)/Fecal Immunochemical Test (FIT)  * Fecal DNA/Cologuard Test  * Flexible Sigmoidoscopy Age: 54-65 years old   Colonoscopy: every 10 years (may be performed more frequently if at higher risk)  OR  FOBT/FIT: every 1 year  OR  Cologuard: every 3 years  OR  Sigmoidoscopy: every 5 years  Screening may be recommended earlier than age 48 if at higher risk for colorectal cancer  Also, an individualized decision between you and your healthcare provider will decide whether screening between the ages of 74-80 would be appropriate   Colonoscopy: 02/16/2017  FOBT/FIT: Not on file  Cologuard: Not on file  Sigmoidoscopy: Not on file         Breast Cancer Screening Age: 36 years old  Frequency: every 1-2 years  Not required if history of left and right mastectomy Mammogram: 09/18/2018    Screening Current   Cervical Cancer Screening Between the ages of 18-28, pap smear recommended once every 3 years  Between the ages of 33-67, can perform pap smear with HPV co-testing every 5 years  Recommendations may differ for women with a history of total hysterectomy, cervical cancer, or abnormal pap smears in past  Pap Smear: Not on file    Screening Not Indicated   Hepatitis C Screening Once for adults born between 1945 and 1965  More frequently in patients at high risk for Hepatitis C Hep C Antibody: Not on file       Diabetes Screening 1-2 times per year if you're at risk for diabetes or have pre-diabetes Fasting glucose: 80 mg/dL   A1C: 8 0 %    Screening Not Indicated  History Diabetes   Cholesterol Screening Once every 5 years if you don't have a lipid disorder  May order more often based on risk factors  Lipid panel: 07/31/2020    Screening Not Indicated  History Lipid Disorder     Other Preventive Screenings Covered by Medicare:  1  Abdominal Aortic Aneurysm (AAA) Screening: covered once if your at risk  You're considered to be at risk if you have a family history of AAA  2  Lung Cancer Screening: covers low dose CT scan once per year if you meet all of the following conditions: (1) Age 50-69; (2) No signs or symptoms of lung cancer; (3) Current smoker or have quit smoking within the last 15 years; (4) You have a tobacco smoking history of at least 30 pack years (packs per day multiplied by number of years you smoked); (5) You get a written order from a healthcare provider  3  Glaucoma Screening: covered annually if you're considered high risk: (1) You have diabetes OR (2) Family history of glaucoma OR (3)  aged 48 and older OR (3)  American aged 72 and older  3   Osteoporosis Screening: covered every 2 years if you meet one of the following conditions: (1) You're estrogen deficient and at risk for osteoporosis based off medical history and other findings; (2) Have a vertebral abnormality; (3) On glucocorticoid therapy for more than 3 months; (4) Have primary hyperparathyroidism; (5) On osteoporosis medications and need to assess response to drug therapy  · Last bone density test (DXA Scan): 02/19/2018  5  HIV Screening: covered annually if you're between the age of 12-76  Also covered annually if you are younger than 13 and older than 72 with risk factors for HIV infection  For pregnant patients, it is covered up to 3 times per pregnancy  Immunizations:  Immunization Recommendations   Influenza Vaccine Annual influenza vaccination during flu season is recommended for all persons aged >= 6 months who do not have contraindications   Pneumococcal Vaccine (Prevnar and Pneumovax)  * Prevnar = PCV13  * Pneumovax = PPSV23   Adults 25-60 years old: 1-3 doses may be recommended based on certain risk factors  Adults 72 years old: Prevnar (PCV13) vaccine recommended followed by Pneumovax (PPSV23) vaccine  If already received PPSV23 since turning 65, then PCV13 recommended at least one year after PPSV23 dose  Hepatitis B Vaccine 3 dose series if at intermediate or high risk (ex: diabetes, end stage renal disease, liver disease)   Tetanus (Td) Vaccine - COST NOT COVERED BY MEDICARE PART B Following completion of primary series, a booster dose should be given every 10 years to maintain immunity against tetanus  Td may also be given as tetanus wound prophylaxis  Tdap Vaccine - COST NOT COVERED BY MEDICARE PART B Recommended at least once for all adults  For pregnant patients, recommended with each pregnancy  Shingles Vaccine (Shingrix) - COST NOT COVERED BY MEDICARE PART B  2 shot series recommended in those aged 48 and above     Health Maintenance Due:      Topic Date Due    DXA SCAN  02/19/2020     Immunizations Due:      Topic Date Due    Influenza Vaccine  07/01/2020     Advance Directives   What are advance directives?   Advance directives are legal documents that state your wishes and plans for medical care  These plans are made ahead of time in case you lose your ability to make decisions for yourself  Advance directives can apply to any medical decision, such as the treatments you want, and if you want to donate organs  What are the types of advance directives? There are many types of advance directives, and each state has rules about how to use them  You may choose a combination of any of the following:  · Living will: This is a written record of the treatment you want  You can also choose which treatments you do not want, which to limit, and which to stop at a certain time  This includes surgery, medicine, IV fluid, and tube feedings  · Durable power of  for healthcare [facility]): This is a written record that states who you want to make healthcare choices for you when you are unable to make them for yourself  This person, called a proxy, is usually a family member or a friend  You may choose more than 1 proxy  · Do not resuscitate (DNR) order:  A DNR order is used in case your heart stops beating or you stop breathing  It is a request not to have certain forms of treatment, such as CPR  A DNR order may be included in other types of advance directives  · Medical directive: This covers the care that you want if you are in a coma, near death, or unable to make decisions for yourself  You can list the treatments you want for each condition  Treatment may include pain medicine, surgery, blood transfusions, dialysis, IV or tube feedings, and a ventilator (breathing machine)  · Values history: This document has questions about your views, beliefs, and how you feel and think about life  This information can help others choose the care that you would choose  Why are advance directives important? An advance directive helps you control your care  Although spoken wishes may be used, it is better to have your wishes written down   Spoken wishes can be misunderstood, or not followed  Treatments may be given even if you do not want them  An advance directive may make it easier for your family to make difficult choices about your care  Urinary Incontinence   Urinary incontinence (UI)  is when you lose control of your bladder  UI develops because your bladder cannot store or empty urine properly  The 3 most common types of UI are stress incontinence, urge incontinence, or both  Medicines:   · May be given to help strengthen your bladder control  Report any side effects of medication to your healthcare provider  Do pelvic muscle exercises often:  Your pelvic muscles help you stop urinating  Squeeze these muscles tight for 5 seconds, then relax for 5 seconds  Gradually work up to squeezing for 10 seconds  Do 3 sets of 15 repetitions a day, or as directed  This will help strengthen your pelvic muscles and improve bladder control  Train your bladder:  Go to the bathroom at set times, such as every 2 hours, even if you do not feel the urge to go  You can also try to hold your urine when you feel the urge to go  For example, hold your urine for 5 minutes when you feel the urge to go  As that becomes easier, hold your urine for 10 minutes  Self-care:   · Keep a UI record  Write down how often you leak urine and how much you leak  Make a note of what you were doing when you leaked urine  · Drink liquids as directed  You may need to limit the amount of liquid you drink to help control your urine leakage  Do not drink any liquid right before you go to bed  Limit or do not have drinks that contain caffeine or alcohol  · Prevent constipation  Eat a variety of high-fiber foods  Good examples are high-fiber cereals, beans, vegetables, and whole-grain breads  Walking is the best way to trigger your intestines to have a bowel movement  · Exercise regularly and maintain a healthy weight    Weight loss and exercise will decrease pressure on your bladder and help you control your leakage  · Use a catheter as directed  to help empty your bladder  A catheter is a tiny, plastic tube that is put into your bladder to drain your urine  · Go to behavior therapy as directed  Behavior therapy may be used to help you learn to control your urge to urinate  Weight Management   Why it is important to manage your weight:  Being overweight increases your risk of health conditions such as heart disease, high blood pressure, type 2 diabetes, and certain types of cancer  It can also increase your risk for osteoarthritis, sleep apnea, and other respiratory problems  Aim for a slow, steady weight loss  Even a small amount of weight loss can lower your risk of health problems  How to lose weight safely:  A safe and healthy way to lose weight is to eat fewer calories and get regular exercise  You can lose up about 1 pound a week by decreasing the number of calories you eat by 500 calories each day  Healthy meal plan for weight management:  A healthy meal plan includes a variety of foods, contains fewer calories, and helps you stay healthy  A healthy meal plan includes the following:  · Eat whole-grain foods more often  A healthy meal plan should contain fiber  Fiber is the part of grains, fruits, and vegetables that is not broken down by your body  Whole-grain foods are healthy and provide extra fiber in your diet  Some examples of whole-grain foods are whole-wheat breads and pastas, oatmeal, brown rice, and bulgur  · Eat a variety of vegetables every day  Include dark, leafy greens such as spinach, kale, lisha greens, and mustard greens  Eat yellow and orange vegetables such as carrots, sweet potatoes, and winter squash  · Eat a variety of fruits every day  Choose fresh or canned fruit (canned in its own juice or light syrup) instead of juice  Fruit juice has very little or no fiber  · Eat low-fat dairy foods  Drink fat-free (skim) milk or 1% milk   Eat fat-free yogurt and low-fat cottage cheese  Try low-fat cheeses such as mozzarella and other reduced-fat cheeses  · Choose meat and other protein foods that are low in fat  Choose beans or other legumes such as split peas or lentils  Choose fish, skinless poultry (chicken or turkey), or lean cuts of red meat (beef or pork)  Before you cook meat or poultry, cut off any visible fat  · Use less fat and oil  Try baking foods instead of frying them  Add less fat, such as margarine, sour cream, regular salad dressing and mayonnaise to foods  Eat fewer high-fat foods  Some examples of high-fat foods include french fries, doughnuts, ice cream, and cakes  · Eat fewer sweets  Limit foods and drinks that are high in sugar  This includes candy, cookies, regular soda, and sweetened drinks  Exercise:  Exercise at least 30 minutes per day on most days of the week  Some examples of exercise include walking, biking, dancing, and swimming  You can also fit in more physical activity by taking the stairs instead of the elevator or parking farther away from stores  Ask your healthcare provider about the best exercise plan for you  © Copyright [employer] 2018 Information is for End User's use only and may not be sold, redistributed or otherwise used for commercial purposes   All illustrations and images included in CareNotes® are the copyrighted property of A D A M , Inc  or [address] [place] [address] [facility]

## 2020-08-10 NOTE — ASSESSMENT & PLAN NOTE
12/31/20: CMP=WNL  HBPM: SBP = 120s - 140s and the DBP = 60s & 70s  Blood pressure is controlled on present treatment regimen  Patient misses no doses and tolerates the meds without side effects  Patient avoids salt  Discussed diet, exercise and weight control  No change in present meds  Continue HBPM   Because of the borderline systolic blood pressure control, and hypercalcemia, and the postprandial hyperglycemia, were going to discontinue the hydrochlorothiazide and substitute lisinopril 10 milligrams daily

## 2020-08-10 NOTE — ASSESSMENT & PLAN NOTE
12/30/19: HDL/LDL/TG = 63/79/50  On simvastatin 20 mg p o  Daily  07/31/20: HDL/LDL/TG = 61/64/46  Continue simvastatin at 20 milligrams orally once a day

## 2020-08-13 DIAGNOSIS — I48.91 ATRIAL FIBRILLATION, UNSPECIFIED TYPE (HCC): ICD-10-CM

## 2020-09-09 ENCOUNTER — HOSPITAL ENCOUNTER (OUTPATIENT)
Dept: BONE DENSITY | Facility: CLINIC | Age: 85
Discharge: HOME | End: 2020-09-09
Payer: COMMERCIAL

## 2020-09-09 ENCOUNTER — HOSPITAL ENCOUNTER (OUTPATIENT)
Dept: MAMMOGRAPHY | Facility: CLINIC | Age: 85
Discharge: HOME | End: 2020-09-09
Payer: COMMERCIAL

## 2020-09-09 ENCOUNTER — CLINICAL SUPPORT (OUTPATIENT)
Dept: FAMILY MEDICINE CLINIC | Facility: CLINIC | Age: 85
End: 2020-09-09
Payer: COMMERCIAL

## 2020-09-09 VITALS — WEIGHT: 151 LBS | HEIGHT: 63 IN | BODY MASS INDEX: 26.75 KG/M2

## 2020-09-09 DIAGNOSIS — I48.0 PAROXYSMAL ATRIAL FIBRILLATION (HCC): ICD-10-CM

## 2020-09-09 DIAGNOSIS — Z79.4 TYPE 2 DIABETES MELLITUS WITH STAGE 1 CHRONIC KIDNEY DISEASE, WITH LONG-TERM CURRENT USE OF INSULIN (HCC): Primary | ICD-10-CM

## 2020-09-09 DIAGNOSIS — E83.52 HYPERCALCEMIA: ICD-10-CM

## 2020-09-09 DIAGNOSIS — M85.80 OSTEOPENIA, UNSPECIFIED LOCATION: ICD-10-CM

## 2020-09-09 DIAGNOSIS — E11.22 TYPE 2 DIABETES MELLITUS WITH STAGE 1 CHRONIC KIDNEY DISEASE, WITH LONG-TERM CURRENT USE OF INSULIN (HCC): Primary | ICD-10-CM

## 2020-09-09 DIAGNOSIS — E55.9 VITAMIN D DEFICIENCY: ICD-10-CM

## 2020-09-09 DIAGNOSIS — N18.1 TYPE 2 DIABETES MELLITUS WITH STAGE 1 CHRONIC KIDNEY DISEASE, WITH LONG-TERM CURRENT USE OF INSULIN (HCC): Primary | ICD-10-CM

## 2020-09-09 DIAGNOSIS — Z12.31 ENCOUNTER FOR SCREENING MAMMOGRAM FOR MALIGNANT NEOPLASM OF BREAST: ICD-10-CM

## 2020-09-09 LAB
25(OH)D3 SERPL-MCNC: 26.8 NG/ML (ref 30–100)
CA-I BLD-SCNC: 1.27 MMOL/L (ref 1.12–1.32)
CALCIUM SERPL-MCNC: 10.1 MG/DL (ref 8.3–10.1)
CREAT UR-MCNC: 24.6 MG/DL
MICROALBUMIN UR-MCNC: 14.6 MG/L (ref 0–20)
MICROALBUMIN/CREAT 24H UR: 59 MG/G CREATININE (ref 0–30)
PTH-INTACT SERPL-MCNC: 106 PG/ML (ref 18.4–80.1)

## 2020-09-09 PROCEDURE — 82570 ASSAY OF URINE CREATININE: CPT | Performed by: FAMILY MEDICINE

## 2020-09-09 PROCEDURE — 77063 BREAST TOMOSYNTHESIS BI: CPT

## 2020-09-09 PROCEDURE — 36415 COLL VENOUS BLD VENIPUNCTURE: CPT

## 2020-09-09 PROCEDURE — 82330 ASSAY OF CALCIUM: CPT | Performed by: FAMILY MEDICINE

## 2020-09-09 PROCEDURE — 82310 ASSAY OF CALCIUM: CPT | Performed by: FAMILY MEDICINE

## 2020-09-09 PROCEDURE — 77080 DXA BONE DENSITY AXIAL: CPT

## 2020-09-09 PROCEDURE — 77067 SCR MAMMO BI INCL CAD: CPT

## 2020-09-09 PROCEDURE — 82306 VITAMIN D 25 HYDROXY: CPT | Performed by: FAMILY MEDICINE

## 2020-09-09 PROCEDURE — 83970 ASSAY OF PARATHORMONE: CPT | Performed by: FAMILY MEDICINE

## 2020-09-09 PROCEDURE — 82043 UR ALBUMIN QUANTITATIVE: CPT | Performed by: FAMILY MEDICINE

## 2020-09-10 PROBLEM — M85.89 OSTEOPENIA OF MULTIPLE SITES: Status: ACTIVE | Noted: 2020-09-10

## 2020-09-10 RX ORDER — DILTIAZEM HYDROCHLORIDE 120 MG/1
120 CAPSULE, COATED, EXTENDED RELEASE ORAL DAILY
Qty: 90 CAPSULE | Refills: 3 | Status: SHIPPED | OUTPATIENT
Start: 2020-09-10 | End: 2021-09-03

## 2020-09-14 ENCOUNTER — OFFICE VISIT (OUTPATIENT)
Dept: FAMILY MEDICINE CLINIC | Facility: CLINIC | Age: 85
End: 2020-09-14
Payer: COMMERCIAL

## 2020-09-14 VITALS
BODY MASS INDEX: 27 KG/M2 | HEART RATE: 83 BPM | SYSTOLIC BLOOD PRESSURE: 178 MMHG | WEIGHT: 152.4 LBS | HEIGHT: 63 IN | TEMPERATURE: 98 F | DIASTOLIC BLOOD PRESSURE: 92 MMHG | RESPIRATION RATE: 14 BRPM

## 2020-09-14 DIAGNOSIS — Z23 ENCOUNTER FOR IMMUNIZATION: ICD-10-CM

## 2020-09-14 DIAGNOSIS — I48.0 PAROXYSMAL ATRIAL FIBRILLATION (HCC): ICD-10-CM

## 2020-09-14 DIAGNOSIS — E83.52 HYPERCALCEMIA: ICD-10-CM

## 2020-09-14 DIAGNOSIS — I10 BENIGN ESSENTIAL HYPERTENSION: Primary | ICD-10-CM

## 2020-09-14 DIAGNOSIS — E55.9 VITAMIN D DEFICIENCY: ICD-10-CM

## 2020-09-14 PROCEDURE — 90662 IIV NO PRSV INCREASED AG IM: CPT

## 2020-09-14 PROCEDURE — G0008 ADMIN INFLUENZA VIRUS VAC: HCPCS

## 2020-09-14 PROCEDURE — 99214 OFFICE O/P EST MOD 30 MIN: CPT | Performed by: FAMILY MEDICINE

## 2020-09-14 RX ORDER — LISINOPRIL 20 MG/1
20 TABLET ORAL DAILY
Qty: 90 TABLET | Refills: 3 | Status: SHIPPED | OUTPATIENT
Start: 2020-09-14 | End: 2020-10-26 | Stop reason: DRUGHIGH

## 2020-09-14 RX ORDER — ERGOCALCIFEROL 1.25 MG/1
50000 CAPSULE ORAL WEEKLY
Qty: 4 CAPSULE | Refills: 0 | Status: SHIPPED | OUTPATIENT
Start: 2020-09-14 | End: 2020-10-04

## 2020-09-14 NOTE — PATIENT INSTRUCTIONS
Stop the calcium and vitamin D supplement that you now take, once a day  Start the vitamin D supplement of 90044 units, 1 capsule, once a week for 4 weeks  At the end of the 4 weeks I wanted to by vitamin D3 supplement in the drug store (OTC) and it is 2000 unit tablets, and take it once a day  Continue to avoid salt and continue checking blood pressures  Keep the atenolol at 50 milligrams twice a day and keep the diltiazem CD at 120 milligrams, once a day  However, increase lisinopril to 20 milligrams, once a day, in the evening  On the night before you have the ultrasound scheduled, cut your Lantus insulin in half and take only 8 units the night before

## 2020-09-14 NOTE — ASSESSMENT & PLAN NOTE
12/30/2019-vitamin-D level equals 36    9/10/20: DEXA scan -- osteopenia but no osteoporosis  9/09/20: calcium = 10 1 and ionized calcium = 1 27 - both are high normal   9/09/20: PTH elevated to 106 and vit D is low at 27  The patient and calcium is not that high and that is firm do with the ionized calcium level  It is possible that this is due more to vitamin-D deficiency  Start vitamin D2, 19873 units, weekly, for 4 weeks and then a supplement of 2000 units once a day

## 2020-09-14 NOTE — ASSESSMENT & PLAN NOTE
Recurrence of problem Serum calcium levels were WNL at 8 9 to 9 2, since the parathyroidectomy on 5/21/18, by Dr Brock Wilkes  12/31/19: corrected serum calcium elevated to 10 9   9/10/20: DEXA scan -- osteopenia but no osteoporosis  9/09/20: calcium = 10 1 and ionized calcium = 1 27 - both are high normal   9/09/20: PTH elevated to 106 and vit D is low at 27  For now, it appears that the elevation in the calcium is due to a vitamin-D deficiency  The calcium levels are not high enough to be of concern  Stop the calcium and vitamin-D supplement  Start vitamin D2, 79397 units, weekly, for 4 weeks and then a supplement of 2000 units once a day

## 2020-09-14 NOTE — ASSESSMENT & PLAN NOTE
HBPM: SBP = 140s & 150s and the DBP = 60s & 70s  Meds reconciled today  Continues to avoid salt  Denies HA or dizziness  She feels good on the atenolol b i d  And Cardizem CD, once a day, at the present dose  We will increase lisinopril to 20 milligrams daily  Continue to avoid salt and continue home blood pressure checks

## 2020-09-14 NOTE — PROGRESS NOTES
Assessment/Plan:    Benign essential hypertension  HBPM: SBP = 140s & 150s and the DBP = 60s & 70s  Meds reconciled today  Continues to avoid salt  Denies HA or dizziness  She feels good on the atenolol b i d  And Cardizem CD, once a day, at the present dose  We will increase lisinopril to 20 milligrams daily  Continue to avoid salt and continue home blood pressure checks  Paroxysmal atrial fibrillation ([address] )  Followed by Cardiology  She said she feels good taking the atenolol b i d  And the lower dose of diltiazem CD at 120 milligrams daily  She has not had any feelings of palpitations  It was actually Cardiology that added diltiazem CD to the present dose of atenolol  When the diltiazem CD was increased she did not feel well on the higher dose  Vitamin D deficiency  12/30/2019-vitamin-D level equals 36    9/10/20: DEXA scan -- osteopenia but no osteoporosis  9/09/20: calcium = 10 1 and ionized calcium = 1 27 - both are high normal   9/09/20: PTH elevated to 106 and vit D is low at 27  The patient and calcium is not that high and that is firm do with the ionized calcium level  It is possible that this is due more to vitamin-D deficiency  Start vitamin D2, 20701 units, weekly, for 4 weeks and then a supplement of 2000 units once a day  Hypercalcemia  Recurrence of problem Serum calcium levels were WNL at 8 9 to 9 2, since the parathyroidectomy on 5/21/18, by Dr Juan R Jacinto  12/31/19: corrected serum calcium elevated to 10 9   9/10/20: DEXA scan -- osteopenia but no osteoporosis  9/09/20: calcium = 10 1 and ionized calcium = 1 27 - both are high normal   9/09/20: PTH elevated to 106 and vit D is low at 27  For now, it appears that the elevation in the calcium is due to a vitamin-D deficiency  The calcium levels are not high enough to be of concern  Stop the calcium and vitamin-D supplement  Start vitamin D2, 20362 units, weekly, for 4 weeks and then a supplement of 2000 units once a day  Diagnoses and all orders for this visit:    Benign essential hypertension  -     lisinopril (ZESTRIL) 20 mg tablet; Take 1 tablet (20 mg total) by mouth daily  -     Basic metabolic panel; Future    Vitamin D deficiency  -     ergocalciferol (VITAMIN D2) 50,000 units; Take 1 capsule (50,000 Units total) by mouth once a week  -     Vitamin D 25 hydroxy; Future    Hypercalcemia  -     ergocalciferol (VITAMIN D2) 50,000 units; Take 1 capsule (50,000 Units total) by mouth once a week  -     Basic metabolic panel; Future    Paroxysmal atrial fibrillation ([facility] [address] )    Encounter for immunization  -     FLUZONE HIGH-DOSE: influenza vaccine, high-dose, preservative-free 0 7 mL          Subjective:     Chief Complaint   Patient presents with    Follow-up     1 month        Patient ID: Cherelle Isabel is a 82 y o  female  HPI : Multiple Chronic Medical Conditions    The following portions of the patient's history were reviewed and updated as appropriate: allergies, current medications, past family history, past medical history, past social history, past surgical history and problem list     Review of Systems   Constitutional: Negative for activity change, appetite change, fatigue, fever and unexpected weight change  HENT: Negative for congestion, dental problem and sneezing  Eyes: Negative for discharge and visual disturbance  Respiratory: Negative for cough, chest tightness, shortness of breath and wheezing  Cardiovascular: Negative for chest pain, palpitations and leg swelling  Gastrointestinal: Negative for abdominal pain, constipation, diarrhea, nausea and vomiting  Endocrine: Negative for polydipsia and polyuria  Genitourinary: Negative for dysuria and frequency  Musculoskeletal: Negative for arthralgias  Skin: Negative for rash  Allergic/Immunologic: Negative for environmental allergies and food allergies  Neurological: Negative for dizziness, light-headedness and headaches     Hematological: Negative for adenopathy  Psychiatric/Behavioral: Negative for behavioral problems and sleep disturbance  Objective:  Vitals:    09/14/20 1455   BP: (!) 178/92   BP Location: Left arm   Patient Position: Sitting   Cuff Size: Standard   Pulse: 83   Resp: 14   Temp: 98 °F (36 7 °C)   Weight: 69 1 kg (152 lb 6 4 oz)   Height: 5' 3" (1 6 m)      Physical Exam  Constitutional:       Appearance: Normal appearance  She is well-developed  HENT:      Head: Normocephalic  Eyes:      General:         Right eye: No discharge  Left eye: No discharge  Conjunctiva/sclera: Conjunctivae normal    Neck:      Musculoskeletal: Normal range of motion and neck supple  Thyroid: No thyromegaly  Cardiovascular:      Rate and Rhythm: Normal rate and regular rhythm  Heart sounds: Normal heart sounds  No murmur  Pulmonary:      Effort: Pulmonary effort is normal       Breath sounds: Normal breath sounds  Abdominal:      General: Bowel sounds are normal       Palpations: Abdomen is soft  Tenderness: There is no abdominal tenderness  Musculoskeletal: Normal range of motion  General: No swelling  Right lower leg: No edema  Left lower leg: No edema  Lymphadenopathy:      Cervical: No cervical adenopathy  Skin:     General: Skin is warm  Findings: No rash  Neurological:      Mental Status: She is alert and oriented to person, place, and time  Psychiatric:         Mood and Affect: Mood normal          Behavior: Behavior normal          Thought Content: Thought content normal          Judgment: Judgment normal          Patient Instructions   Stop the calcium and vitamin D supplement that you now take, once a day  Start the vitamin D supplement of 49951 units, 1 capsule, once a week for 4 weeks  At the end of the 4 weeks I wanted to by vitamin D3 supplement in the drug store (OTC) and it is 2000 unit tablets, and take it once a day     Continue to avoid salt and continue checking blood pressures  Keep the atenolol at 50 milligrams twice a day and keep the diltiazem CD at 120 milligrams, once a day  However, increase lisinopril to 20 milligrams, once a day, in the evening  On the night before you have the ultrasound scheduled, cut your Lantus insulin in half and take only 8 units the night before

## 2020-09-14 NOTE — ASSESSMENT & PLAN NOTE
Followed by Cardiology  She said she feels good taking the atenolol b i d  And the lower dose of diltiazem CD at 120 milligrams daily  She has not had any feelings of palpitations  It was actually Cardiology that added diltiazem CD to the present dose of atenolol  When the diltiazem CD was increased she did not feel well on the higher dose

## 2020-09-17 ENCOUNTER — HOSPITAL ENCOUNTER (OUTPATIENT)
Dept: NON INVASIVE DIAGNOSTICS | Facility: CLINIC | Age: 85
Discharge: HOME | End: 2020-09-17
Payer: COMMERCIAL

## 2020-09-17 DIAGNOSIS — I72.8 SPLENIC ARTERY ANEURYSM (HCC): ICD-10-CM

## 2020-09-17 PROCEDURE — 93975 VASCULAR STUDY: CPT

## 2020-09-18 PROCEDURE — 93975 VASCULAR STUDY: CPT | Performed by: SURGERY

## 2020-09-22 ENCOUNTER — TELEPHONE (OUTPATIENT)
Dept: VASCULAR SURGERY | Facility: CLINIC | Age: 85
End: 2020-09-22

## 2020-09-22 NOTE — TELEPHONE ENCOUNTER
COVID Pre-Visit Screening     1  Is this a family member screening? No  2  Have you traveled outside of your state in the past 2 weeks? No  3  Do you presently have a fever or flu-like symptoms? No  4  Do you have symptoms of an upper respiratory infection like runny nose, sore throat, or cough? No  5  Are you suffering from new headache that you have not had in the past?  No  6  Do you have/have you experienced any new shortness of breath recently? No  7  Do you have any new diarrhea, nausea or vomiting? No  8  Have you been in contact with anyone who has been sick or diagnosed with COVID-19? No  9  Do you have any new loss of taste or smell? No  10  Are you able to wear a mask without a valve for the entire visit? Yes  Confirmed with pt provider, date , time and location address  Pt verbalized understanding

## 2020-09-23 ENCOUNTER — OFFICE VISIT (OUTPATIENT)
Dept: VASCULAR SURGERY | Facility: CLINIC | Age: 85
End: 2020-09-23
Payer: COMMERCIAL

## 2020-09-23 VITALS
HEART RATE: 90 BPM | TEMPERATURE: 98.9 F | RESPIRATION RATE: 18 BRPM | BODY MASS INDEX: 26.93 KG/M2 | SYSTOLIC BLOOD PRESSURE: 180 MMHG | HEIGHT: 63 IN | DIASTOLIC BLOOD PRESSURE: 96 MMHG | WEIGHT: 152 LBS

## 2020-09-23 DIAGNOSIS — I72.8 SPLENIC ARTERY ANEURYSM (HCC): Primary | ICD-10-CM

## 2020-09-23 DIAGNOSIS — E11.22 TYPE 2 DIABETES MELLITUS WITH STAGE 1 CHRONIC KIDNEY DISEASE, WITH LONG-TERM CURRENT USE OF INSULIN (HCC): ICD-10-CM

## 2020-09-23 DIAGNOSIS — N18.1 TYPE 2 DIABETES MELLITUS WITH STAGE 1 CHRONIC KIDNEY DISEASE, WITH LONG-TERM CURRENT USE OF INSULIN (HCC): ICD-10-CM

## 2020-09-23 DIAGNOSIS — I10 BENIGN ESSENTIAL HYPERTENSION: ICD-10-CM

## 2020-09-23 DIAGNOSIS — I48.0 PAROXYSMAL ATRIAL FIBRILLATION (HCC): ICD-10-CM

## 2020-09-23 DIAGNOSIS — Z79.4 TYPE 2 DIABETES MELLITUS WITH STAGE 1 CHRONIC KIDNEY DISEASE, WITH LONG-TERM CURRENT USE OF INSULIN (HCC): ICD-10-CM

## 2020-09-23 PROCEDURE — 1160F RVW MEDS BY RX/DR IN RCRD: CPT | Performed by: PHYSICIAN ASSISTANT

## 2020-09-23 PROCEDURE — 99214 OFFICE O/P EST MOD 30 MIN: CPT | Performed by: PHYSICIAN ASSISTANT

## 2020-09-23 PROCEDURE — 3080F DIAST BP >= 90 MM HG: CPT | Performed by: PHYSICIAN ASSISTANT

## 2020-09-23 PROCEDURE — 3077F SYST BP >= 140 MM HG: CPT | Performed by: PHYSICIAN ASSISTANT

## 2020-09-23 PROCEDURE — 1036F TOBACCO NON-USER: CPT | Performed by: PHYSICIAN ASSISTANT

## 2020-09-23 NOTE — ASSESSMENT & PLAN NOTE
-stable, rate controlled  -continue current medical regimen with medication modifications per Cardiology/PCP  -continue anticoagulation therapy  -management and follow-up per Cardiology

## 2020-09-23 NOTE — ASSESSMENT & PLAN NOTE
-BP elevated at today's appointment  Patient has been experiencing fluctuations in BP   -continue monitoring blood pressure at home  Patient experiences white coat hypertension  -continue current medical regimen with modifications per PCP    -management per PCP

## 2020-09-23 NOTE — ASSESSMENT & PLAN NOTE
-stable  -continue to optimize BS control for prevention of vascular disease  -management per PCP  Lab Results   Component Value Date    HGBA1C 8 0 (H) 07/31/2020

## 2020-09-23 NOTE — ASSESSMENT & PLAN NOTE
56-year-old female nonsmoker with HTN, HLD, melanoma, type 2 DM, hyperparathyroidism, s/p resection, PAF on Xarelto and 1 3 cm splenic artery aneurysm  Patient asymptomatic  Recent noninvasive imaging demonstrates stability of known splenic artery aneurysm, currently measuring 1 17 cm, no AAA and patent celiac and SMA  -continue surveillance with repeat mesenteric duplex in 1 year  -significant hypertension office today  Continue to optimize BP management per PCP  -return to office in 1 year with mesenteric duplex for RFM  -continue Xarelto for atrial fibrillation  -continue statin therapy related to HLD  -please contact the office with new symptoms or concerns

## 2020-09-23 NOTE — PROGRESS NOTES
Assessment/Plan:    Splenic artery aneurysm [facility])  60-year-old female nonsmoker with HTN, HLD, melanoma, type 2 DM, hyperparathyroidism, s/p resection, PAF on Xarelto and 1 3 cm splenic artery aneurysm  Patient asymptomatic  Recent noninvasive imaging demonstrates stability of known splenic artery aneurysm, currently measuring 1 17 cm, no AAA and patent celiac and SMA  -continue surveillance with repeat mesenteric duplex in 1 year  -significant hypertension office today  Continue to optimize BP management per PCP  -return to office in 1 year with mesenteric duplex for RFM  -continue Xarelto for atrial fibrillation  -continue statin therapy related to HLD  -please contact the office with new symptoms or concerns  Benign essential hypertension  -BP elevated at today's appointment  Patient has been experiencing fluctuations in BP   -continue monitoring blood pressure at home  Patient experiences white coat hypertension  -continue current medical regimen with modifications per PCP  -management per PCP    Type 2 diabetes mellitus with chronic kidney disease, with long-term current use of insulin (HCC)  -stable  -continue to optimize BS control for prevention of vascular disease  -management per PCP  Lab Results   Component Value Date    HGBA1C 8 0 (H) 07/31/2020       Mixed hyperlipidemia  -stable  -continue statin therapy  -management per PCP    Paroxysmal atrial fibrillation (HCC)  -stable, rate controlled  -continue current medical regimen with medication modifications per Cardiology/PCP  -continue anticoagulation therapy  -management and follow-up per Cardiology       Diagnoses and all orders for this visit:    Splenic artery aneurysm ([address] )  -     VAS celiac and/or mesenteric duplex; complete study;  Future    Benign essential hypertension    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, with long-term current use of insulin (HCC)    Paroxysmal atrial fibrillation (HCC) Subjective:      Patient ID: Cathlyn Olszewski is a 82 y o  female  Patient presents in office to review the results of her celiac mesenteric duplex done on 9/17  Patient has a h/o HTN and diabetes  Patient denies any abdominal or back pain  Patient is taking xarelto and simvastatin  51-year-old female nonsmoker with HTN, HLD, melanoma, type 2 DM, hyperparathyroidism, s/p resection, PAF on Xarelto and 1 3 cm splenic artery aneurysm who returns the office to review recent surveillance noninvasive imaging  Mesenteric duplex on 9/17/2020 has been reviewed and demonstrates 1 17 cm splenic artery aneurysm, patent celiac and SMA and no evidence of AAA  Patient asymptomatic and denies abdominal pain, back pain, postprandial abdominal pain or flank pain  The following portions of the patient's history were reviewed and updated as appropriate: allergies, current medications, past family history, past medical history, past social history, past surgical history and problem list     Review of Systems   Constitutional: Negative  Negative for chills and fever  HENT: Negative  Negative for ear pain, hearing loss, sinus pain, sneezing, sore throat and trouble swallowing  Eyes: Negative  Negative for pain and visual disturbance  Respiratory: Negative  Negative for cough, chest tightness and shortness of breath  Cardiovascular: Negative  Negative for leg swelling  Gastrointestinal: Negative  Negative for abdominal pain, diarrhea, nausea and vomiting  Endocrine: Negative  Genitourinary: Positive for frequency (when nervous)  Negative for difficulty urinating  Musculoskeletal: Negative  Negative for back pain and gait problem  Skin: Negative  Negative for wound  Allergic/Immunologic: Negative  Neurological: Negative  Negative for speech difficulty, weakness, numbness and headaches  Hematological: Negative  Psychiatric/Behavioral: Negative  Negative for self-injury         I have reviewed and made appropriate changes to the review of systems input by the medical assistant      Vitals:    09/23/20 1621   BP: (!) 180/96   BP Location: Right arm   Patient Position: Sitting   Cuff Size: Adult   Pulse: 90   Resp: 18   Temp: 98 9 °F (37 2 °C)   TempSrc: Tympanic   Weight: 68 9 kg (152 lb)   Height: 5' 3" (1 6 m)       Patient Active Problem List   Diagnosis    Benign essential hypertension    Female bladder prolapse    Fibrocystic breast disease    Hypercalcemia    Incontinence    Kyphosis    Microproteinuria    Ventricular premature depolarization    Tinnitus    Paroxysmal atrial fibrillation (HCC)    Mixed hyperlipidemia    Type 2 diabetes mellitus with chronic kidney disease, with long-term current use of insulin (HCC)    Splenic artery aneurysm (HCC)    Thyroid nodule    Vitamin D deficiency    Vertigo    BMI 28 0-28 9,adult    Osteopenia of multiple sites       Past Surgical History:   Procedure Laterality Date    GALLBLADDER SURGERY  1991    HYSTERECTOMY  1995    UTERINE PROLAPSE VAGINAL POLYPS    HYSTERECTOMY      IRON REPLACEMENT    OTHER SURGICAL HISTORY  11/2011    EXCISION MELANOMA IN SITU MID BACK    PARATHYROIDECTOMY  05/21/2018       Family History   Problem Relation Age of Onset    Other Mother         TESTED(-)    Colon cancer Father 61    Prostate cancer Father 79    Factor V Leiden deficiency Daughter     Factor V Leiden deficiency Daughter     Breast cancer Sister         in her 63's    No Known Problems Maternal Grandmother     No Known Problems Maternal Grandfather     No Known Problems Paternal Grandmother     No Known Problems Paternal Grandfather     No Known Problems Sister     No Known Problems Sister     No Known Problems Maternal Aunt     No Known Problems Maternal Aunt     No Known Problems Maternal Aunt     No Known Problems Maternal Aunt     No Known Problems Maternal Aunt     No Known Problems Maternal Aunt     No Known Problems Maternal Aunt     No Known Problems Paternal Aunt     No Known Problems Paternal Aunt     No Known Problems Paternal Aunt        Social History     Socioeconomic History    Marital status: /Civil Union     Spouse name: Not on file    Number of children: Not on file    Years of education: Not on file    Highest education level: Not on file   Occupational History    Not on file   Social Needs    Financial resource strain: Not on file    Food insecurity     Worry: Not on file     Inability: Not on file   [employer] needs     Medical: Not on file     Non-medical: Not on file   Tobacco Use    Smoking status: Never Smoker    Smokeless tobacco: Never Used   Substance and Sexual Activity    Alcohol use: No    Drug use: No    Sexual activity: Never   Lifestyle    Physical activity     Days per week: Not on file     Minutes per session: Not on file    Stress: Not on file   Relationships    Social connections     Talks on phone: Not on file     Gets together: Not on file     Attends [facility] service: Not on file     Active member of club or organization: Not on file     Attends meetings of clubs or organizations: Not on file     Relationship status: Not on file    Intimate partner violence     Fear of current or ex partner: Not on file     Emotionally abused: Not on file     Physically abused: Not on file     Forced sexual activity: Not on file   Other Topics Concern    Not on file   Social History Narrative    Not on file       Allergies   Allergen Reactions    No Active Allergies          Current Outpatient Medications:     atenolol (TENORMIN) 50 mg tablet, Take 1 tablet (50 mg total) by mouth 2 (two) times a day, Disp: 180 tablet, Rfl: 3    BD PEN NEEDLE RIZWAN U/F 32G X 4 MM MISC, by Other route daily, Disp: 100 each, Rfl: 3    diltiazem (CARDIZEM CD) 120 mg 24 hr capsule, Take 1 capsule (120 mg total) by mouth daily, Disp: 90 capsule, Rfl: 3    ergocalciferol (VITAMIN D2) 50,000 units, Take 1 capsule (50,000 Units total) by mouth once a week, Disp: 4 capsule, Rfl: 0    glucose blood (Accu-Chek SmartView) test strip, TEST BLOOD SUGAR ONCE A DAY, Disp: 100 each, Rfl: 1    insulin detemir (Levemir FlexTouch) 100 Units/mL injection pen, Inject 14 Units under the skin daily at bedtime, Disp: 5 pen, Rfl: 3    lisinopril (ZESTRIL) 20 mg tablet, Take 1 tablet (20 mg total) by mouth daily, Disp: 90 tablet, Rfl: 3    MICROLET LANCETS MISC, by Does not apply route daily, Disp: 50 each, Rfl: 3    repaglinide (PRANDIN) 0 5 mg tablet, Take 1 tablet (0 5 mg total) by mouth 3 (three) times a day before meals, Disp: 270 tablet, Rfl: 3    rivaroxaban (Xarelto) 20 mg tablet, Take 1 tablet (20 mg total) by mouth every 24 hours, Disp: 90 tablet, Rfl: 3    simvastatin (ZOCOR) 20 mg tablet, Take 1 tablet (20 mg total) by mouth daily, Disp: 90 tablet, Rfl: 3    Objective:  Imaging study:  Mesenteric duplex 9/17/2020:  Imaging study reviewed and as described above  See full report below:  Unilateral              PSV  EDV    Sup-Caridad Ao               97   15    Celiac                  121   17    Prox  SMA               210   19    Celiac Artery Standing  166   23    Px Inf-Mandeep Ao           106    0    Ds Inf-Mandeep Ao           117    0    Splenic                 101   16       Segment     Rig       Left                      PSV  EDV  PSV  EDV    Prox Renal  185   20  104    8             CONCLUSION:  Impression  The aorta is patent and normal in caliber  The celiac, splenic and hepatic arteries are patent  The Splenic artery measures 1 17cm  Prior 1 3cm  In comparison to the study of 05/07/2019, there is no significant change  BP (!) 180/96 (BP Location: Right arm, Patient Position: Sitting, Cuff Size: Adult)   Pulse 90   Temp 98 9 °F (37 2 °C) (Tympanic)   Resp 18   Ht 5' 3" (1 6 m)   Wt 68 9 kg (152 lb)   BMI 26 93 kg/m²          Physical Exam  Vitals signs and nursing note reviewed  Constitutional:       General: She is not in acute distress  Appearance: She is well-developed  HENT:      Head: Normocephalic and atraumatic  Eyes:      General: No scleral icterus  Conjunctiva/sclera: Conjunctivae normal       Pupils: Pupils are equal, round, and reactive to light  Neck:      Musculoskeletal: Normal range of motion and neck supple  Thyroid: No thyromegaly  Vascular: No carotid bruit or JVD  Trachea: No tracheal deviation  Cardiovascular:      Rate and Rhythm: Normal rate and regular rhythm  Pulses:           Carotid pulses are 2+ on the right side and 2+ on the left side  Radial pulses are 2+ on the right side and 2+ on the left side  Posterior tibial pulses are 2+ on the right side and 2+ on the left side  Heart sounds: S1 normal and S2 normal  No murmur  No friction rub  No gallop  No S3 sounds  Pulmonary:      Effort: No respiratory distress  Breath sounds: Normal breath sounds  No stridor  No wheezing, rhonchi or rales  Abdominal:      General: Abdomen is flat  Bowel sounds are normal  There is no distension or abdominal bruit  Palpations: Abdomen is soft  There is no splenomegaly, mass or pulsatile mass  Tenderness: There is no abdominal tenderness  There is no rebound  Musculoskeletal: Normal range of motion  General: No deformity  Skin:     General: Skin is warm and dry  Coloration: Skin is not pale  Findings: No erythema or lesion  Neurological:      General: No focal deficit present  Mental Status: She is alert and oriented to person, place, and time  Psychiatric:         Mood and Affect: Mood normal          Thought Content:  Thought content normal

## 2020-10-04 DIAGNOSIS — E55.9 VITAMIN D DEFICIENCY: ICD-10-CM

## 2020-10-04 DIAGNOSIS — E83.52 HYPERCALCEMIA: ICD-10-CM

## 2020-10-04 RX ORDER — ERGOCALCIFEROL 1.25 MG/1
CAPSULE ORAL
Qty: 4 CAPSULE | Refills: 0 | Status: SHIPPED | OUTPATIENT
Start: 2020-10-04 | End: 2020-10-26 | Stop reason: ALTCHOICE

## 2020-10-05 ENCOUNTER — TELEPHONE (OUTPATIENT)
Dept: FAMILY MEDICINE CLINIC | Facility: CLINIC | Age: 85
End: 2020-10-05

## 2020-10-19 ENCOUNTER — CLINICAL SUPPORT (OUTPATIENT)
Dept: FAMILY MEDICINE CLINIC | Facility: CLINIC | Age: 85
End: 2020-10-19
Payer: COMMERCIAL

## 2020-10-19 DIAGNOSIS — I10 BENIGN ESSENTIAL HYPERTENSION: ICD-10-CM

## 2020-10-19 DIAGNOSIS — E83.52 HYPERCALCEMIA: ICD-10-CM

## 2020-10-19 DIAGNOSIS — E55.9 VITAMIN D DEFICIENCY: ICD-10-CM

## 2020-10-19 LAB
25(OH)D3 SERPL-MCNC: 48.4 NG/ML (ref 30–100)
ANION GAP SERPL CALCULATED.3IONS-SCNC: 3 MMOL/L (ref 4–13)
BUN SERPL-MCNC: 21 MG/DL (ref 5–25)
CALCIUM SERPL-MCNC: 8.9 MG/DL (ref 8.3–10.1)
CHLORIDE SERPL-SCNC: 105 MMOL/L (ref 100–108)
CO2 SERPL-SCNC: 32 MMOL/L (ref 21–32)
CREAT SERPL-MCNC: 0.96 MG/DL (ref 0.6–1.3)
GFR SERPL CREATININE-BSD FRML MDRD: 55 ML/MIN/1.73SQ M
GLUCOSE SERPL-MCNC: 185 MG/DL (ref 65–140)
POTASSIUM SERPL-SCNC: 4 MMOL/L (ref 3.5–5.3)
SODIUM SERPL-SCNC: 140 MMOL/L (ref 136–145)

## 2020-10-19 PROCEDURE — 80048 BASIC METABOLIC PNL TOTAL CA: CPT | Performed by: FAMILY MEDICINE

## 2020-10-19 PROCEDURE — 82306 VITAMIN D 25 HYDROXY: CPT | Performed by: FAMILY MEDICINE

## 2020-10-19 PROCEDURE — 36415 COLL VENOUS BLD VENIPUNCTURE: CPT | Performed by: FAMILY MEDICINE

## 2020-10-20 DIAGNOSIS — Z79.4 CONTROLLED TYPE 2 DIABETES MELLITUS WITHOUT COMPLICATION, WITH LONG-TERM CURRENT USE OF INSULIN (HCC): ICD-10-CM

## 2020-10-20 DIAGNOSIS — E11.9 CONTROLLED TYPE 2 DIABETES MELLITUS WITHOUT COMPLICATION, WITH LONG-TERM CURRENT USE OF INSULIN (HCC): ICD-10-CM

## 2020-10-20 RX ORDER — REPAGLINIDE 0.5 MG/1
0.5 TABLET ORAL
Qty: 270 TABLET | Refills: 3 | Status: SHIPPED | OUTPATIENT
Start: 2020-10-20 | End: 2021-01-05 | Stop reason: ALTCHOICE

## 2020-10-21 PROBLEM — N18.31 STAGE 3A CHRONIC KIDNEY DISEASE (HCC): Status: ACTIVE | Noted: 2020-10-21

## 2020-10-26 ENCOUNTER — OFFICE VISIT (OUTPATIENT)
Dept: FAMILY MEDICINE CLINIC | Facility: CLINIC | Age: 85
End: 2020-10-26
Payer: COMMERCIAL

## 2020-10-26 VITALS
DIASTOLIC BLOOD PRESSURE: 84 MMHG | TEMPERATURE: 99.2 F | WEIGHT: 149.7 LBS | BODY MASS INDEX: 26.52 KG/M2 | OXYGEN SATURATION: 97 % | SYSTOLIC BLOOD PRESSURE: 186 MMHG | HEIGHT: 63 IN | HEART RATE: 78 BPM

## 2020-10-26 DIAGNOSIS — E04.1 THYROID NODULE: ICD-10-CM

## 2020-10-26 DIAGNOSIS — E83.52 HYPERCALCEMIA: ICD-10-CM

## 2020-10-26 DIAGNOSIS — F41.9 ANXIETY: ICD-10-CM

## 2020-10-26 DIAGNOSIS — E78.2 MIXED HYPERLIPIDEMIA: ICD-10-CM

## 2020-10-26 DIAGNOSIS — I48.0 PAROXYSMAL ATRIAL FIBRILLATION (HCC): ICD-10-CM

## 2020-10-26 DIAGNOSIS — E55.9 VITAMIN D DEFICIENCY: ICD-10-CM

## 2020-10-26 DIAGNOSIS — I10 BENIGN ESSENTIAL HYPERTENSION: ICD-10-CM

## 2020-10-26 DIAGNOSIS — R42 DYSEQUILIBRIUM: ICD-10-CM

## 2020-10-26 DIAGNOSIS — N18.1 TYPE 2 DIABETES MELLITUS WITH STAGE 1 CHRONIC KIDNEY DISEASE, WITH LONG-TERM CURRENT USE OF INSULIN (HCC): ICD-10-CM

## 2020-10-26 DIAGNOSIS — E11.22 TYPE 2 DIABETES MELLITUS WITH STAGE 1 CHRONIC KIDNEY DISEASE, WITH LONG-TERM CURRENT USE OF INSULIN (HCC): ICD-10-CM

## 2020-10-26 DIAGNOSIS — Z79.4 TYPE 2 DIABETES MELLITUS WITH STAGE 1 CHRONIC KIDNEY DISEASE, WITH LONG-TERM CURRENT USE OF INSULIN (HCC): ICD-10-CM

## 2020-10-26 DIAGNOSIS — N18.31 STAGE 3A CHRONIC KIDNEY DISEASE (HCC): ICD-10-CM

## 2020-10-26 PROCEDURE — 99214 OFFICE O/P EST MOD 30 MIN: CPT | Performed by: FAMILY MEDICINE

## 2020-10-26 PROCEDURE — 1101F PT FALLS ASSESS-DOCD LE1/YR: CPT | Performed by: FAMILY MEDICINE

## 2020-10-26 PROCEDURE — 3725F SCREEN DEPRESSION PERFORMED: CPT | Performed by: FAMILY MEDICINE

## 2020-10-26 PROCEDURE — 3288F FALL RISK ASSESSMENT DOCD: CPT | Performed by: FAMILY MEDICINE

## 2020-10-26 RX ORDER — MULTIVIT-MIN/IRON/FOLIC ACID/K 18-600-40
1 CAPSULE ORAL DAILY
COMMUNITY

## 2020-10-26 RX ORDER — HYDROXYZINE HYDROCHLORIDE 25 MG/1
TABLET, FILM COATED ORAL
Qty: 30 TABLET | Refills: 2 | Status: SHIPPED | OUTPATIENT
Start: 2020-10-26 | End: 2020-11-04

## 2020-10-26 RX ORDER — LISINOPRIL 40 MG/1
40 TABLET ORAL DAILY
Qty: 90 TABLET | Refills: 3 | Status: SHIPPED | OUTPATIENT
Start: 2020-10-26 | End: 2020-11-23 | Stop reason: DRUGHIGH

## 2020-10-27 ENCOUNTER — OFFICE VISIT (OUTPATIENT)
Dept: CARDIOLOGY CLINIC | Facility: CLINIC | Age: 85
End: 2020-10-27
Payer: COMMERCIAL

## 2020-10-27 VITALS
HEIGHT: 63 IN | SYSTOLIC BLOOD PRESSURE: 184 MMHG | WEIGHT: 151 LBS | HEART RATE: 94 BPM | DIASTOLIC BLOOD PRESSURE: 95 MMHG | BODY MASS INDEX: 26.75 KG/M2

## 2020-10-27 DIAGNOSIS — R00.2 PALPITATIONS: ICD-10-CM

## 2020-10-27 DIAGNOSIS — E88.09 HYPOALBUMINEMIA: ICD-10-CM

## 2020-10-27 DIAGNOSIS — Z79.4 TYPE 2 DIABETES MELLITUS WITH STAGE 1 CHRONIC KIDNEY DISEASE, WITH LONG-TERM CURRENT USE OF INSULIN (HCC): ICD-10-CM

## 2020-10-27 DIAGNOSIS — N18.31 STAGE 3A CHRONIC KIDNEY DISEASE (HCC): ICD-10-CM

## 2020-10-27 DIAGNOSIS — N18.1 TYPE 2 DIABETES MELLITUS WITH STAGE 1 CHRONIC KIDNEY DISEASE, WITH LONG-TERM CURRENT USE OF INSULIN (HCC): ICD-10-CM

## 2020-10-27 DIAGNOSIS — I49.3 PVC'S (PREMATURE VENTRICULAR CONTRACTIONS): ICD-10-CM

## 2020-10-27 DIAGNOSIS — E78.2 MIXED HYPERLIPIDEMIA: ICD-10-CM

## 2020-10-27 DIAGNOSIS — I10 BENIGN ESSENTIAL HYPERTENSION: ICD-10-CM

## 2020-10-27 DIAGNOSIS — E11.22 TYPE 2 DIABETES MELLITUS WITH STAGE 1 CHRONIC KIDNEY DISEASE, WITH LONG-TERM CURRENT USE OF INSULIN (HCC): ICD-10-CM

## 2020-10-27 DIAGNOSIS — I48.0 PAROXYSMAL ATRIAL FIBRILLATION (HCC): Primary | ICD-10-CM

## 2020-10-27 PROCEDURE — 93000 ELECTROCARDIOGRAM COMPLETE: CPT | Performed by: INTERNAL MEDICINE

## 2020-10-27 PROCEDURE — 99214 OFFICE O/P EST MOD 30 MIN: CPT | Performed by: INTERNAL MEDICINE

## 2020-11-02 ENCOUNTER — HOSPITAL ENCOUNTER (OUTPATIENT)
Dept: ULTRASOUND IMAGING | Facility: HOSPITAL | Age: 85
Discharge: HOME | End: 2020-11-02
Payer: COMMERCIAL

## 2020-11-02 DIAGNOSIS — E11.9 TYPE 2 DIABETES MELLITUS WITHOUT COMPLICATION, UNSPECIFIED WHETHER LONG TERM INSULIN USE (HCC): ICD-10-CM

## 2020-11-02 DIAGNOSIS — E04.1 THYROID NODULE: ICD-10-CM

## 2020-11-02 PROCEDURE — 76536 US EXAM OF HEAD AND NECK: CPT

## 2020-11-02 RX ORDER — BLOOD SUGAR DIAGNOSTIC
STRIP MISCELLANEOUS
Qty: 100 EACH | Refills: 3 | Status: SHIPPED | OUTPATIENT
Start: 2020-11-02 | End: 2021-09-01

## 2020-11-04 DIAGNOSIS — F41.9 ANXIETY: ICD-10-CM

## 2020-11-04 RX ORDER — HYDROXYZINE HYDROCHLORIDE 25 MG/1
TABLET, FILM COATED ORAL
Qty: 60 TABLET | Refills: 3 | Status: SHIPPED | OUTPATIENT
Start: 2020-11-04

## 2020-11-10 ENCOUNTER — VBI (OUTPATIENT)
Dept: ADMINISTRATIVE | Facility: OTHER | Age: 85
End: 2020-11-10

## 2020-11-11 ENCOUNTER — HOSPITAL ENCOUNTER (OUTPATIENT)
Dept: NON INVASIVE DIAGNOSTICS | Facility: HOSPITAL | Age: 85
Discharge: HOME | End: 2020-11-11
Payer: COMMERCIAL

## 2020-11-11 DIAGNOSIS — R00.2 PALPITATIONS: ICD-10-CM

## 2020-11-11 DIAGNOSIS — I49.3 PVC'S (PREMATURE VENTRICULAR CONTRACTIONS): ICD-10-CM

## 2020-11-11 PROCEDURE — 93225 XTRNL ECG REC<48 HRS REC: CPT

## 2020-11-11 PROCEDURE — 93226 XTRNL ECG REC<48 HR SCAN A/R: CPT

## 2020-11-12 ENCOUNTER — TELEPHONE (OUTPATIENT)
Dept: FAMILY MEDICINE CLINIC | Facility: CLINIC | Age: 85
End: 2020-11-12

## 2020-11-17 PROCEDURE — 93227 XTRNL ECG REC<48 HR R&I: CPT | Performed by: INTERNAL MEDICINE

## 2020-11-23 ENCOUNTER — OFFICE VISIT (OUTPATIENT)
Dept: FAMILY MEDICINE CLINIC | Facility: CLINIC | Age: 85
End: 2020-11-23
Payer: COMMERCIAL

## 2020-11-23 VITALS
HEART RATE: 90 BPM | RESPIRATION RATE: 18 BRPM | DIASTOLIC BLOOD PRESSURE: 120 MMHG | TEMPERATURE: 97.9 F | WEIGHT: 151.2 LBS | SYSTOLIC BLOOD PRESSURE: 180 MMHG | BODY MASS INDEX: 26.79 KG/M2 | HEIGHT: 63 IN

## 2020-11-23 DIAGNOSIS — E11.22 TYPE 2 DIABETES MELLITUS WITH STAGE 1 CHRONIC KIDNEY DISEASE, WITH LONG-TERM CURRENT USE OF INSULIN (HCC): ICD-10-CM

## 2020-11-23 DIAGNOSIS — N18.31 STAGE 3A CHRONIC KIDNEY DISEASE (HCC): ICD-10-CM

## 2020-11-23 DIAGNOSIS — Z79.4 TYPE 2 DIABETES MELLITUS WITH STAGE 1 CHRONIC KIDNEY DISEASE, WITH LONG-TERM CURRENT USE OF INSULIN (HCC): ICD-10-CM

## 2020-11-23 DIAGNOSIS — R00.2 PALPITATIONS: ICD-10-CM

## 2020-11-23 DIAGNOSIS — N18.1 TYPE 2 DIABETES MELLITUS WITH STAGE 1 CHRONIC KIDNEY DISEASE, WITH LONG-TERM CURRENT USE OF INSULIN (HCC): ICD-10-CM

## 2020-11-23 DIAGNOSIS — I10 BENIGN ESSENTIAL HYPERTENSION: Primary | ICD-10-CM

## 2020-11-23 DIAGNOSIS — I48.0 PAROXYSMAL ATRIAL FIBRILLATION (HCC): ICD-10-CM

## 2020-11-23 DIAGNOSIS — E04.1 THYROID NODULE: ICD-10-CM

## 2020-11-23 DIAGNOSIS — E83.52 HYPERCALCEMIA: ICD-10-CM

## 2020-11-23 PROCEDURE — 99214 OFFICE O/P EST MOD 30 MIN: CPT | Performed by: FAMILY MEDICINE

## 2020-11-23 PROCEDURE — 3077F SYST BP >= 140 MM HG: CPT | Performed by: FAMILY MEDICINE

## 2020-11-23 PROCEDURE — 1160F RVW MEDS BY RX/DR IN RCRD: CPT | Performed by: FAMILY MEDICINE

## 2020-11-23 PROCEDURE — 1036F TOBACCO NON-USER: CPT | Performed by: FAMILY MEDICINE

## 2020-11-23 PROCEDURE — 3080F DIAST BP >= 90 MM HG: CPT | Performed by: FAMILY MEDICINE

## 2020-11-23 RX ORDER — LISINOPRIL 20 MG/1
20 TABLET ORAL DAILY
Qty: 90 TABLET | Refills: 3 | Status: SHIPPED | OUTPATIENT
Start: 2020-11-23 | End: 2021-09-07 | Stop reason: SDUPTHER

## 2020-11-23 RX ORDER — CHLORTHALIDONE 25 MG/1
TABLET ORAL
Qty: 90 TABLET | Refills: 3 | Status: SHIPPED | OUTPATIENT
Start: 2020-11-23 | End: 2020-12-01 | Stop reason: DRUGHIGH

## 2020-11-30 DIAGNOSIS — I10 BENIGN ESSENTIAL HYPERTENSION: ICD-10-CM

## 2020-12-01 DIAGNOSIS — I10 BENIGN ESSENTIAL HYPERTENSION: Primary | ICD-10-CM

## 2020-12-01 RX ORDER — HYDROCHLOROTHIAZIDE 12.5 MG/1
12.5 TABLET ORAL DAILY
Qty: 90 TABLET | Refills: 3 | Status: SHIPPED | OUTPATIENT
Start: 2020-12-01 | End: 2021-09-07 | Stop reason: SDUPTHER

## 2020-12-14 ENCOUNTER — VBI (OUTPATIENT)
Dept: ADMINISTRATIVE | Facility: OTHER | Age: 85
End: 2020-12-14

## 2020-12-28 ENCOUNTER — OFFICE VISIT (OUTPATIENT)
Dept: FAMILY MEDICINE CLINIC | Facility: CLINIC | Age: 85
End: 2020-12-28
Payer: COMMERCIAL

## 2020-12-28 VITALS
TEMPERATURE: 97.6 F | DIASTOLIC BLOOD PRESSURE: 70 MMHG | WEIGHT: 144.7 LBS | SYSTOLIC BLOOD PRESSURE: 132 MMHG | RESPIRATION RATE: 16 BRPM | OXYGEN SATURATION: 96 % | HEART RATE: 84 BPM | BODY MASS INDEX: 25.64 KG/M2 | HEIGHT: 63 IN

## 2020-12-28 DIAGNOSIS — R42 DYSEQUILIBRIUM: ICD-10-CM

## 2020-12-28 DIAGNOSIS — R39.89 BLADDER PAIN: ICD-10-CM

## 2020-12-28 DIAGNOSIS — I10 BENIGN ESSENTIAL HYPERTENSION: Primary | ICD-10-CM

## 2020-12-28 DIAGNOSIS — F43.22 ADJUSTMENT DISORDER WITH ANXIOUS MOOD: ICD-10-CM

## 2020-12-28 DIAGNOSIS — R42 VERTIGO: ICD-10-CM

## 2020-12-28 PROCEDURE — T1015 CLINIC SERVICE: HCPCS | Performed by: FAMILY MEDICINE

## 2020-12-28 PROCEDURE — 99214 OFFICE O/P EST MOD 30 MIN: CPT | Performed by: FAMILY MEDICINE

## 2020-12-28 PROCEDURE — 1160F RVW MEDS BY RX/DR IN RCRD: CPT | Performed by: FAMILY MEDICINE

## 2020-12-28 PROCEDURE — 1036F TOBACCO NON-USER: CPT | Performed by: FAMILY MEDICINE

## 2020-12-29 ENCOUNTER — OFFICE VISIT (OUTPATIENT)
Dept: FAMILY MEDICINE CLINIC | Facility: CLINIC | Age: 85
End: 2020-12-29
Payer: COMMERCIAL

## 2020-12-29 VITALS
RESPIRATION RATE: 18 BRPM | OXYGEN SATURATION: 96 % | DIASTOLIC BLOOD PRESSURE: 94 MMHG | HEIGHT: 63 IN | WEIGHT: 144 LBS | SYSTOLIC BLOOD PRESSURE: 178 MMHG | HEART RATE: 89 BPM | BODY MASS INDEX: 25.52 KG/M2

## 2020-12-29 DIAGNOSIS — N39.0 URINARY TRACT INFECTION WITHOUT HEMATURIA, SITE UNSPECIFIED: Primary | ICD-10-CM

## 2020-12-29 DIAGNOSIS — R35.0 URINARY FREQUENCY: Primary | ICD-10-CM

## 2020-12-29 LAB
BACTERIA UR QL AUTO: ABNORMAL /HPF
BILIRUB UR QL STRIP: NEGATIVE
CLARITY UR: ABNORMAL
COLOR UR: YELLOW
GLUCOSE UR STRIP-MCNC: ABNORMAL MG/DL
HGB UR QL STRIP.AUTO: NEGATIVE
HYALINE CASTS #/AREA URNS LPF: ABNORMAL /LPF
KETONES UR STRIP-MCNC: NEGATIVE MG/DL
LEUKOCYTE ESTERASE UR QL STRIP: ABNORMAL
NITRITE UR QL STRIP: NEGATIVE
NON-SQ EPI CELLS URNS QL MICRO: ABNORMAL /HPF
PH UR STRIP.AUTO: 5.5 [PH]
PROT UR STRIP-MCNC: NEGATIVE MG/DL
RBC #/AREA URNS AUTO: ABNORMAL /HPF
SL AMB  POCT GLUCOSE, UA: NEGATIVE
SL AMB LEUKOCYTE ESTERASE,UA: NEGATIVE
SL AMB POCT BILIRUBIN,UA: NEGATIVE
SL AMB POCT BLOOD,UA: NEGATIVE
SL AMB POCT CLARITY,UA: NORMAL
SL AMB POCT COLOR,UA: YELLOW
SL AMB POCT KETONES,UA: NEGATIVE
SL AMB POCT NITRITE,UA: NEGATIVE
SL AMB POCT PH,UA: 6
SL AMB POCT SPECIFIC GRAVITY,UA: 1.01
SL AMB POCT URINE PROTEIN: NEGATIVE
SL AMB POCT UROBILINOGEN: NEGATIVE
SP GR UR STRIP.AUTO: 1.02 (ref 1–1.03)
UROBILINOGEN UR QL STRIP.AUTO: 0.2 E.U./DL
WBC #/AREA URNS AUTO: ABNORMAL /HPF

## 2020-12-29 PROCEDURE — 87086 URINE CULTURE/COLONY COUNT: CPT

## 2020-12-29 PROCEDURE — 81001 URINALYSIS AUTO W/SCOPE: CPT

## 2020-12-29 PROCEDURE — 81002 URINALYSIS NONAUTO W/O SCOPE: CPT

## 2020-12-29 PROCEDURE — 87077 CULTURE AEROBIC IDENTIFY: CPT

## 2020-12-29 PROCEDURE — 99999 PR OFFICE/OUTPT VISIT,PROCEDURE ONLY: CPT

## 2020-12-29 RX ORDER — CEPHALEXIN 250 MG/1
250 CAPSULE ORAL 3 TIMES DAILY
Qty: 15 CAPSULE | Refills: 0 | Status: SHIPPED | OUTPATIENT
Start: 2020-12-29 | End: 2021-01-03

## 2020-12-31 LAB
BACTERIA UR CULT: ABNORMAL
BACTERIA UR CULT: ABNORMAL

## 2021-01-05 ENCOUNTER — OFFICE VISIT (OUTPATIENT)
Dept: FAMILY MEDICINE CLINIC | Facility: CLINIC | Age: 86
End: 2021-01-05
Payer: COMMERCIAL

## 2021-01-05 VITALS
DIASTOLIC BLOOD PRESSURE: 88 MMHG | HEART RATE: 79 BPM | WEIGHT: 147.1 LBS | BODY MASS INDEX: 26.06 KG/M2 | HEIGHT: 63 IN | SYSTOLIC BLOOD PRESSURE: 142 MMHG | OXYGEN SATURATION: 96 % | RESPIRATION RATE: 18 BRPM | TEMPERATURE: 96.9 F

## 2021-01-05 DIAGNOSIS — E11.22 TYPE 2 DIABETES MELLITUS WITH STAGE 1 CHRONIC KIDNEY DISEASE, WITH LONG-TERM CURRENT USE OF INSULIN (HCC): ICD-10-CM

## 2021-01-05 DIAGNOSIS — N30.90 CYSTITIS: ICD-10-CM

## 2021-01-05 DIAGNOSIS — N18.1 TYPE 2 DIABETES MELLITUS WITH STAGE 1 CHRONIC KIDNEY DISEASE, WITH LONG-TERM CURRENT USE OF INSULIN (HCC): Primary | ICD-10-CM

## 2021-01-05 DIAGNOSIS — Z79.4 TYPE 2 DIABETES MELLITUS WITH STAGE 1 CHRONIC KIDNEY DISEASE, WITH LONG-TERM CURRENT USE OF INSULIN (HCC): ICD-10-CM

## 2021-01-05 DIAGNOSIS — N18.31 STAGE 3A CHRONIC KIDNEY DISEASE (HCC): ICD-10-CM

## 2021-01-05 DIAGNOSIS — N18.1 TYPE 2 DIABETES MELLITUS WITH STAGE 1 CHRONIC KIDNEY DISEASE, WITH LONG-TERM CURRENT USE OF INSULIN (HCC): ICD-10-CM

## 2021-01-05 DIAGNOSIS — I48.0 PAROXYSMAL ATRIAL FIBRILLATION (HCC): ICD-10-CM

## 2021-01-05 DIAGNOSIS — Z79.4 TYPE 2 DIABETES MELLITUS WITH STAGE 1 CHRONIC KIDNEY DISEASE, WITH LONG-TERM CURRENT USE OF INSULIN (HCC): Primary | ICD-10-CM

## 2021-01-05 DIAGNOSIS — I10 BENIGN ESSENTIAL HYPERTENSION: ICD-10-CM

## 2021-01-05 DIAGNOSIS — E11.22 TYPE 2 DIABETES MELLITUS WITH STAGE 1 CHRONIC KIDNEY DISEASE, WITH LONG-TERM CURRENT USE OF INSULIN (HCC): Primary | ICD-10-CM

## 2021-01-05 LAB — SL AMB POCT HEMOGLOBIN AIC: 9.2 (ref ?–6.5)

## 2021-01-05 PROCEDURE — 99214 OFFICE O/P EST MOD 30 MIN: CPT | Performed by: FAMILY MEDICINE

## 2021-01-05 PROCEDURE — 3079F DIAST BP 80-89 MM HG: CPT | Performed by: FAMILY MEDICINE

## 2021-01-05 PROCEDURE — 83036 HEMOGLOBIN GLYCOSYLATED A1C: CPT | Performed by: FAMILY MEDICINE

## 2021-01-05 PROCEDURE — 3077F SYST BP >= 140 MM HG: CPT | Performed by: FAMILY MEDICINE

## 2021-01-05 RX ORDER — DULAGLUTIDE 0.75 MG/.5ML
0.75 INJECTION, SOLUTION SUBCUTANEOUS WEEKLY
Qty: 2 PEN | Refills: 3 | Status: SHIPPED | OUTPATIENT
Start: 2021-01-05 | End: 2021-01-20

## 2021-01-05 RX ORDER — DULAGLUTIDE 0.75 MG/.5ML
0.75 INJECTION, SOLUTION SUBCUTANEOUS WEEKLY
Qty: 4 PEN | Refills: 3 | Status: SHIPPED | OUTPATIENT
Start: 2021-01-05 | End: 2021-01-05

## 2021-01-05 NOTE — ASSESSMENT & PLAN NOTE
10/19/20: BMP=WNL except for renal function  HBPM: SBP = 130s and DBP = 60s  Blood pressure is controlled on present treatment regimen  Patient misses no doses and tolerates the meds without side effects  Patient avoids salt  Discussed diet, exercise and weight control  No change in present meds   Continue HBPM

## 2021-01-05 NOTE — PATIENT INSTRUCTIONS
Continue to check blood sugars and blood pressures, at home  Stop the repaglinide  When you get the Trulicity let me know and the dosage of that is 1 injection, once a week  When you start to Trulicity decrease the Levemir insulin to 10 units, once a day

## 2021-01-05 NOTE — PROGRESS NOTES
Assessment/Plan:    Benign essential hypertension  10/19/20: BMP=WNL except for renal function  HBPM: SBP = 130s and DBP = 60s  Blood pressure is controlled on present treatment regimen  Patient misses no doses and tolerates the meds without side effects  Patient avoids salt  Discussed diet, exercise and weight control  No change in present meds  Continue HBPM     Paroxysmal atrial fibrillation ([facility] [address] )  Followed by Cardiology  Is on medication for rate control and also anticoagulation with Xarelto  Type 2 diabetes mellitus with chronic kidney disease, with long-term current use of insulin ([facility] [address] )  Meds reconciled  Levemir insulin is at 14 units SQ at 11pm   Lab Results   Component Value Date    HGBA1C 8 1 (H) 10/01/2020   HBSM: FBS = 130-165 and occasionally as high as 200  Cannot tolerate higher doses of repaglinide and could not tolerate the metformin due to GI side effects  Fingerstick HbA1c today = 9 2%  Discontinue repaglinide  Start Trulicity, once a week and decrease Levemir to 10 units once a day  Diagnoses and all orders for this visit:    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, with long-term current use of insulin ([facility])  -     POCT hemoglobin A1c  -     Dulaglutide (Trulicity) 6 82 TJ/4 3XO SOPN; Inject 0 5 mL (0 75 mg total) under the skin once a week    Benign essential hypertension    Stage 3a chronic kidney disease    Cystitis  Comments:  Urine culture was positive and bacteria was sensitive to the 5 days of cephalosporin treatment  Patient is asymptomatic, now  Paroxysmal atrial fibrillation (HCC)          Subjective:     Chief Complaint   Patient presents with    Hypertension    Diabetes        Patient ID: Yue Collier is a 82 y o  female      HPI : Multiple Chronic Medical Conditions    The following portions of the patient's history were reviewed and updated as appropriate: allergies, current medications, past family history, past medical history, past social history, past surgical history and problem list     Review of Systems   Constitutional: Negative for activity change, appetite change, fatigue, fever and unexpected weight change  HENT: Negative for congestion, dental problem and sneezing  Eyes: Negative for discharge and visual disturbance  Respiratory: Negative for cough and wheezing  Gastrointestinal: Negative for abdominal pain, constipation, diarrhea, nausea and vomiting  Endocrine: Negative for polydipsia and polyuria  Genitourinary: Negative for dysuria, frequency and hematuria  Has appointment with gynecologist, next week, to check on bladder prolapse  Had a previous partial hysterectomy  Musculoskeletal: Negative for arthralgias  Skin: Negative for rash  Allergic/Immunologic: Negative for environmental allergies and food allergies  Neurological: Negative for headaches  Hematological: Negative for adenopathy  Psychiatric/Behavioral: Negative for behavioral problems and sleep disturbance  Objective:  Vitals:    01/05/21 0855   BP: 142/88   BP Location: Left arm   Patient Position: Sitting   Cuff Size: Standard   Pulse: 79   Resp: 18   Temp: (!) 96 9 °F (36 1 °C)   TempSrc: Tympanic   SpO2: 96%   Weight: 66 7 kg (147 lb 1 6 oz)   Height: 5' 3" (1 6 m)      Physical Exam  Vitals signs reviewed  Constitutional:       Appearance: Normal appearance  She is well-developed  She is not ill-appearing  HENT:      Head: Normocephalic  Right Ear: External ear normal       Left Ear: External ear normal       Nose: Nose normal    Eyes:      General:         Right eye: No discharge  Left eye: No discharge  Conjunctiva/sclera: Conjunctivae normal       Pupils: Pupils are equal, round, and reactive to light  Neck:      Musculoskeletal: Normal range of motion and neck supple  Thyroid: No thyromegaly  Cardiovascular:      Rate and Rhythm: Normal rate and regular rhythm        Heart sounds: Normal heart sounds  No murmur  Pulmonary:      Effort: Pulmonary effort is normal       Breath sounds: Normal breath sounds  Abdominal:      General: Bowel sounds are normal       Palpations: Abdomen is soft  Tenderness: There is no abdominal tenderness  Musculoskeletal: Normal range of motion  Right lower leg: No edema  Left lower leg: No edema  Lymphadenopathy:      Cervical: No cervical adenopathy  Skin:     General: Skin is warm  Findings: No rash  Neurological:      General: No focal deficit present  Mental Status: She is alert and oriented to person, place, and time  Psychiatric:         Mood and Affect: Mood normal          Behavior: Behavior normal          Thought Content: Thought content normal          Judgment: Judgment normal          Patient Instructions   Continue to check blood sugars and blood pressures, at home  Stop the repaglinide  When you get the Trulicity let me know and the dosage of that is 1 injection, once a week  When you start to Trulicity decrease the Levemir insulin to 10 units, once a day

## 2021-01-05 NOTE — ASSESSMENT & PLAN NOTE
Meds reconciled  Levemir insulin is at 14 units SQ at 11pm   Lab Results   Component Value Date    HGBA1C 8 1 (H) 10/01/2020   HBSM: FBS = 130-165 and occasionally as high as 200  Cannot tolerate higher doses of repaglinide and could not tolerate the metformin due to GI side effects  Fingerstick HbA1c today = 9 2%  Discontinue repaglinide  Start Trulicity, once a week and decrease Levemir to 10 units once a day

## 2021-01-20 ENCOUNTER — OFFICE VISIT (OUTPATIENT)
Dept: CARDIOLOGY CLINIC | Facility: CLINIC | Age: 86
End: 2021-01-20
Payer: COMMERCIAL

## 2021-01-20 VITALS
BODY MASS INDEX: 26.26 KG/M2 | TEMPERATURE: 97.2 F | OXYGEN SATURATION: 96 % | WEIGHT: 148.2 LBS | HEIGHT: 63 IN | DIASTOLIC BLOOD PRESSURE: 90 MMHG | SYSTOLIC BLOOD PRESSURE: 180 MMHG | HEART RATE: 100 BPM

## 2021-01-20 DIAGNOSIS — E11.22 TYPE 2 DIABETES MELLITUS WITH STAGE 1 CHRONIC KIDNEY DISEASE, WITH LONG-TERM CURRENT USE OF INSULIN (HCC): ICD-10-CM

## 2021-01-20 DIAGNOSIS — I49.3 VENTRICULAR PREMATURE DEPOLARIZATION: ICD-10-CM

## 2021-01-20 DIAGNOSIS — N18.31 STAGE 3A CHRONIC KIDNEY DISEASE (HCC): ICD-10-CM

## 2021-01-20 DIAGNOSIS — I48.0 PAROXYSMAL ATRIAL FIBRILLATION (HCC): Primary | ICD-10-CM

## 2021-01-20 DIAGNOSIS — I10 BENIGN ESSENTIAL HYPERTENSION: ICD-10-CM

## 2021-01-20 DIAGNOSIS — Z79.4 TYPE 2 DIABETES MELLITUS WITH STAGE 1 CHRONIC KIDNEY DISEASE, WITH LONG-TERM CURRENT USE OF INSULIN (HCC): ICD-10-CM

## 2021-01-20 DIAGNOSIS — R00.2 PALPITATIONS: ICD-10-CM

## 2021-01-20 DIAGNOSIS — E78.2 MIXED HYPERLIPIDEMIA: ICD-10-CM

## 2021-01-20 DIAGNOSIS — E88.09 HYPOALBUMINEMIA: ICD-10-CM

## 2021-01-20 DIAGNOSIS — N18.1 TYPE 2 DIABETES MELLITUS WITH STAGE 1 CHRONIC KIDNEY DISEASE, WITH LONG-TERM CURRENT USE OF INSULIN (HCC): ICD-10-CM

## 2021-01-20 PROCEDURE — 1036F TOBACCO NON-USER: CPT | Performed by: INTERNAL MEDICINE

## 2021-01-20 PROCEDURE — 99214 OFFICE O/P EST MOD 30 MIN: CPT | Performed by: INTERNAL MEDICINE

## 2021-01-20 PROCEDURE — 1160F RVW MEDS BY RX/DR IN RCRD: CPT | Performed by: INTERNAL MEDICINE

## 2021-01-20 RX ORDER — REPAGLINIDE 0.5 MG/1
0.5 TABLET ORAL
COMMUNITY
End: 2021-02-16 | Stop reason: ALTCHOICE

## 2021-01-20 NOTE — LETTER
January 20, 2021     Sakina Velazquez MD  [address] [facility] [address]    Patient: Atul Higuera   YOB: 1935   Date of Visit: 1/20/2021       Dear Dr Melyssa Whitaker:    Thank you for referring Tad Almazan to me for evaluation  Below are my notes for this consultation  If you have questions, please do not hesitate to call me  I look forward to following your patient along with you  Sincerely,        Forrest Barnes MD        CC: No Recipients  Forrest Barnes MD  1/20/2021  1:55 PM  Sign when Signing Visit                                             Cardiology Follow Up    Atul Higuera  1935  [id number]  [address]  [address] [facility] [address] [phone]    1  Paroxysmal atrial fibrillation (HCC)     2  Mixed hyperlipidemia     3  Benign essential hypertension     4  Palpitations     5  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, with long-term current use of insulin (HCC)     6  Stage 3a chronic kidney disease     7  Hypoalbuminemia  Comprehensive metabolic panel   8  Ventricular premature depolarization         Patient was 1st seen by us for new onset atrial fibrillation with RVR postop parathyroidectomy by Dr Macias Mo  She is a 2nd admission for the same and at that time had a type 2 non ST elevation myocardial infarction  CT scan PE study was negative  Past medical history includes hypertension, hyperlipidemia and diabetes mellitus  05/29/2018 stress test: Negative Persantine stress test  LVEF 77%, hyperdynamic ventricle  Normal tomographic perfusion series  05/23/2018 echocardiogram: Normal left ventricular systolic function, EF 95-04%  Intermediate left ventricular diastolic function  Normal left ventricular filling pressures  Mild-to-moderate left atrial enlargement  Aortic sclerosis without stenosis  06/18/2019 lipid profile:  Total cholesterol 148, triglycerides 49, HDL 55, LDL direct 79  09/30/2020 lipid panel: Total cholesterol 148, triglycerides 59, HDL 67, LDL calculated 69, non HDL cholesterol 81  Interval History:  Patient has no cardiac complaints  Patient denies chest discomfort or shortness of breath  Patient has no palpitations  Patient denies symptoms of dizziness, lightheadedness or near-syncope/syncope  Patient denies leg edema  Patient denies symptoms of orthopnea or paroxysmal nocturnal dyspnea  She is drinking Ensure daily in a effort to boost her albumin  She is tolerating it well  Discussion/Summary:    1  Nonfasting CMP  2   Return in 4 months    Patient Active Problem List   Diagnosis    Benign essential hypertension    Female bladder prolapse    Fibrocystic breast disease    Hypercalcemia    Incontinence    Kyphosis    Microproteinuria    Ventricular premature depolarization    Tinnitus    Paroxysmal atrial fibrillation (HCC)    Mixed hyperlipidemia    Type 2 diabetes mellitus with chronic kidney disease, with long-term current use of insulin (HCC)    Splenic artery aneurysm (HCC)    Thyroid nodule    Vitamin D deficiency    BMI 28 0-28 9,adult    Osteopenia of multiple sites    Stage 3a chronic kidney disease    Dysequilibrium    Anxiety    Palpitations    Hypoalbuminemia    Adjustment disorder with anxious mood     Past Medical History:   Diagnosis Date    Anemia     IRON DEF ANEMIA DUE TO MENORRHAGIA    [facility]     Hypercalcemia 10/06/2010    MILD  DECREASES HCTZ    Hypertension     Melanoma ([facility] ) 11/2011    IN SITU  MID BACK    Palpitations 05/28/2018    SINUS TACHYCARDIA  TROPONIN SPLL    Skin cancer 2017    NOSE     Social History     Socioeconomic History    Marital status: /Civil Union     Spouse name: Not on file    Number of children: Not on file    Years of education: Not on file    Highest education level: Not on file   Occupational History    Not on file   Social Needs    Financial resource strain: Not on file  Food insecurity     Worry: Not on file     Inability: Not on file    Transportation needs     Medical: Not on file     Non-medical: Not on file   Tobacco Use    Smoking status: Never Smoker    Smokeless tobacco: Never Used   Substance and Sexual Activity    Alcohol use: No    Drug use: No    Sexual activity: Never   Lifestyle    Physical activity     Days per week: Not on file     Minutes per session: Not on file    Stress: Not on file   Relationships    Social connections     Talks on phone: Not on file     Gets together: Not on file     Attends Shinto service: Not on file     Active member of club or organization: Not on file     Attends meetings of clubs or organizations: Not on file     Relationship status: Not on file    Intimate partner violence     Fear of current or ex partner: Not on file     Emotionally abused: Not on file     Physically abused: Not on file     Forced sexual activity: Not on file   Other Topics Concern    Not on file   Social History Narrative    Not on file      Family History   Problem Relation Age of Onset    Other Mother         TESTED(-)    Colon cancer Father 61    Prostate cancer Father 79    Factor V Leiden deficiency Daughter     Factor V Leiden deficiency Daughter     Breast cancer Sister         in her 63's    No Known Problems Maternal Grandmother     No Known Problems Maternal Grandfather     No Known Problems Paternal Grandmother     No Known Problems Paternal Grandfather     No Known Problems Sister     No Known Problems Sister     No Known Problems Maternal Aunt     No Known Problems Maternal Aunt     No Known Problems Maternal Aunt     No Known Problems Maternal Aunt     No Known Problems Maternal Aunt     No Known Problems Maternal Aunt     No Known Problems Maternal Aunt     No Known Problems Paternal Aunt     No Known Problems Paternal Aunt     No Known Problems Paternal Aunt      Past Surgical History:   Procedure Laterality Date    GALLBLADDER SURGERY  1991    HYSTERECTOMY  1995    UTERINE PROLAPSE VAGINAL POLYPS    HYSTERECTOMY      IRON REPLACEMENT    OTHER SURGICAL HISTORY  11/2011    EXCISION MELANOMA IN SITU MID BACK    PARATHYROIDECTOMY  05/21/2018       Current Outpatient Medications:     atenolol (TENORMIN) 50 mg tablet, Take 1 tablet (50 mg total) by mouth 2 (two) times a day, Disp: 180 tablet, Rfl: 3    BD PEN NEEDLE RIZWAN U/F 32G X 4 MM MISC, by Other route daily, Disp: 100 each, Rfl: 3    diltiazem (CARDIZEM CD) 120 mg 24 hr capsule, Take 1 capsule (120 mg total) by mouth daily, Disp: 90 capsule, Rfl: 3    glucose blood (Accu-Chek SmartView) test strip, USE TO TEST BLOOD SUGAR ONCE A DAY, Disp: 100 each, Rfl: 3    hydrochlorothiazide (HYDRODIURIL) 12 5 mg tablet, Take 1 tablet (12 5 mg total) by mouth daily, Disp: 90 tablet, Rfl: 3    hydrOXYzine HCL (ATARAX) 25 mg tablet, TAKE 1 TABLET TWICE DAILY AS NEEDED FOR ANXIETY, Disp: 60 tablet, Rfl: 3    insulin detemir (Levemir FlexTouch) 100 Units/mL injection pen, Inject 14 Units under the skin daily at bedtime, Disp: 5 pen, Rfl: 3    lisinopril (ZESTRIL) 20 mg tablet, Take 1 tablet (20 mg total) by mouth daily, Disp: 90 tablet, Rfl: 3    MICROLET LANCETS MISC, by Does not apply route daily, Disp: 50 each, Rfl: 3    repaglinide (PRANDIN) 0 5 mg tablet, Take 0 5 mg by mouth 3 (three) times a day before meals, Disp: , Rfl:     rivaroxaban (Xarelto) 20 mg tablet, Take 1 tablet (20 mg total) by mouth every 24 hours, Disp: 90 tablet, Rfl: 3    simvastatin (ZOCOR) 20 mg tablet, Take 1 tablet (20 mg total) by mouth daily, Disp: 90 tablet, Rfl: 3    Vitamin D, Cholecalciferol, 50 MCG (2000 UT) CAPS, Take 1 capsule by mouth daily, Disp: , Rfl:   Allergies   Allergen Reactions    No Active Allergies        No results found for this visit on 01/20/21        Review of Systems:  Review of Systems   Respiratory: Negative for cough, choking, chest tightness, shortness of breath and wheezing  Cardiovascular: Negative for chest pain, palpitations and leg swelling  Musculoskeletal: Negative for gait problem  Skin: Negative for rash  Neurological: Negative for dizziness, tremors, syncope, weakness, light-headedness, numbness and headaches  Psychiatric/Behavioral: Negative for agitation and behavioral problems  The patient is not hyperactive  Physical Exam:  BP (!) 180/90   Pulse 100   Temp (!) 97 2 °F (36 2 °C)   Ht 5' 3" (1 6 m)   Wt 67 2 kg (148 lb 3 2 oz)   SpO2 96%   BMI 26 25 kg/m²   Physical Exam  Vitals signs reviewed  Constitutional:       General: She is not in acute distress  Appearance: She is well-developed  HENT:      Head: Normocephalic and atraumatic  Neck:      Thyroid: No thyromegaly  Vascular: No carotid bruit or JVD  Cardiovascular:      Rate and Rhythm: Normal rate and regular rhythm  Heart sounds: Normal heart sounds  No murmur  No friction rub  No gallop  Pulmonary:      Effort: No respiratory distress  Breath sounds: No wheezing, rhonchi or rales  Musculoskeletal:      Right lower leg: No edema  Left lower leg: No edema  Skin:     General: Skin is warm and dry  Neurological:      General: No focal deficit present  Mental Status: She is alert and oriented to person, place, and time     Psychiatric:         Mood and Affect: Mood normal          Behavior: Behavior normal          Judgment: Judgment normal          --------------------------------------------------------------------------------  ECHO:   Results for orders placed in visit on 05/23/18   Echo complete with contrast if indicated    Narrative [address] [phone]  [facility], [address]  [phone]    Cardiovascular Report  _________________________________________________________________         Transthoracic Echocardiogram Report  Ankur Chambers    MRN:                 [id number]  Account : [id number]  Accession :         [id number]  Exam Date:           2018 11:13  Patient Location:    [id number]  Ordering Phys:       Lyndon Pugh  Attending Phys:      Kendal Vines  Technologist:        Margarita Beckwith    Age:  [de-identified]             :   1935           Gender:   F  Wt:   158 lb         Ht:    63 in  Indication:  Atrial fibrillation  BP:         /       HR:    Rhythm:              Sinus    Technical Quality:   Fair      MEASUREMENTS  2D ECHO  Body Surface Area                 1 8 m²  LV Diastolic Diameter PLAX        4 1 cm  LV Systolic Diameter PLAX         2 8 cm  IVS Diastolic Thickness           1 1 cm  LVPW Diastolic Thickness          1 0 cm  LV Relative Wall Thickness        0 5  RV Internal Dim ED PLAX           3 4 cm  Aortic Root Diameter              3 2 cm  LA Systolic Diameter LX           3 8 cm  LA Area                           21 1 cm²  LV Ejection Fraction MOD 4C       67 7 %  LA Volume AL                      68 5 cm³  LA Volume AL Index                38 0 cm³/m²  RA Area 4C View                   13 8 cm²    DOPPLER  AV Peak Velocity                  130 8 cm/s  AV Peak Gradient                  6 8 mmHg  LVOT Peak Velocity                103 7 cm/s  LVOT Peak Gradient                4 3 mmHg  MV Area PHT                       3 1 cm²  Mitral E Point Velocity           103 2 cm/s  Mitral A Point Velocity           99 7 cm/s  Mitral E to A Ratio               1 0  MV E' Velocity                    8 0 cm/s  Mitral E to MV E' Ratio           12 9  TR Peak Velocity                  207 7 cm/s  TR Peak Gradient                  17 2 mmHg  TAPSE                             2 3 cm  Right Atrial Pressure             5 0 mmHg  Pulmonary Artery Systolic Pressu  42 0 mmHg  Right Ventricular Systolic Press  69 2 mmHg  PV Peak Velocity                  96 1 cm/s  PV Peak Gradient                  3 7 mmHg      FINDINGS  Left Ventricle  Normal left ventricular systolic function, EF estimated at 60-  65%  Normal left ventricular cavity size  Normal left  ventricular wall thickness  Normal left ventricular wall motion  Indeterminate left ventricular diastolic function  Normal left  ventricular filling pressures  Right Ventricle  Normal right ventricular cavity size and systolic function,  right ventricular systolic pressure, 63 1 mmHg  Normal pulmonary  arterial systolic pressure  Right Atrium  Normal right atrial size  Left Atrium  Mild-to-moderate left atrial enlargement  Mitral Valve  Trace mitral regurgitation  Aortic Valve  Aortic sclerosis without stenosis  Tricuspid Valve  Trace tricuspid regurgitation  Pulmonic Valve  No pulmonic regurgitation  Pericardium  No pericardial effusion  Aorta  Normal aortic root size  CONCLUSIONS  Normal left ventricular systolic function, EF estimated at 60-  65%  Indeterminate left ventricular diastolic function  Normal left ventricular filling pressures  Mild-to-moderate left atrial enlargement  Aortic sclerosis without stenosis  Forrest Barnes MD  (Electronically Signed)  Final Date:      23 May 2018 16:28  CV Report                    [facility]       [id number]  Final                                                     Page 3     No results found for this or any previous visit   --------------------------------------------------------------------------------  HOLTER  Results for orders placed during the hospital encounter of 20   Holter monitor - 24 hour    Narrative PT NAME: Atul Higuera  : 1935  AGE: 82 y o  GENDER: female  MRN: [id number]   PROCEDURE: Holter monitor - 24 hour        INDICATIONS:   PALPITATIONS        Impression 1  The patient had predominantly sinus rhythm with minor 1st degree AV   block  2  Heart rate varied from 56 bpm to 86 bpm   The patients average heart   rate was 66 bpm     3  The patient had a holter monitor tracing done for 23 hours and 59   minutes    4  The patient had 12 single ventricular ectopic activity versus Lucero   Phenomenon  5  The patient had 3 single supraventricular ectopy  6  The longest R/R interval was 1 4 seconds  7  No other major arrhythmia was noted  8  No entries were made in the diary attached           ======================================================    Lab Results   Component Value Date    WBC 10 90 (H) 01/16/2019    HGB 13 6 01/16/2019    HCT 42 6 01/16/2019    MCV 93 01/16/2019     01/16/2019      Lab Results   Component Value Date    SODIUM 140 10/19/2020    K 4 0 10/19/2020     10/19/2020    CO2 32 10/19/2020    BUN 21 10/19/2020    CREATININE 0 96 10/19/2020    GLUC 185 (H) 10/19/2020    CALCIUM 8 9 10/19/2020      Lab Results   Component Value Date    HGBA1C 9 2 (A) 01/05/2021      Lab Results   Component Value Date    CHOL 128 05/24/2018     Lab Results   Component Value Date    HDL 61 07/31/2020    HDL 63 12/30/2019    HDL 55 06/18/2019     Lab Results   Component Value Date    LDLCALC 64 07/31/2020    LDLCALC 79 12/30/2019    LDLCALC 79 06/18/2019     Lab Results   Component Value Date    TRIG 46 07/31/2020    TRIG 50 12/30/2019    TRIG 49 06/18/2019         Imaging:   I have personally reviewed pertinent reports  Portions of the record may have been created with voice recognition software  Occasional wrong word or "sound a like" substitutions may have occurred due to the inherent limitations of voice recognition software  Read the chart carefully and recognize, using context, where substitutions have occurred

## 2021-01-20 NOTE — PROGRESS NOTES
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, with long-term current use of insulin (HCC)     6  Stage 3a chronic kidney disease     7  Hypoalbuminemia  Comprehensive metabolic panel   8  Ventricular premature depolarization         Patient was 1st seen by us for new onset atrial fibrillation with RVR postop parathyroidectomy by Dr Cecilio Jimenez  She is a 2nd admission for the same and at that time had a type 2 non ST elevation myocardial infarction  CT scan PE study was negative  Past medical history includes hypertension, hyperlipidemia and diabetes mellitus  05/29/2018 stress test: Negative Persantine stress test  LVEF 77%, hyperdynamic ventricle  Normal tomographic perfusion series  05/23/2018 echocardiogram: Normal left ventricular systolic function, EF 86-73%  Intermediate left ventricular diastolic function  Normal left ventricular filling pressures  Mild-to-moderate left atrial enlargement  Aortic sclerosis without stenosis  06/18/2019 lipid profile: Total cholesterol 148, triglycerides 49, HDL 55, LDL direct 79  09/30/2020 lipid panel: Total cholesterol 148, triglycerides 59, HDL 67, LDL calculated 69, non HDL cholesterol 81  Interval History:  Patient has no cardiac complaints  Patient denies chest discomfort or shortness of breath  Patient has no palpitations  Patient denies symptoms of dizziness, lightheadedness or near-syncope/syncope  Patient denies leg edema  Patient denies symptoms of orthopnea or paroxysmal nocturnal dyspnea  She is drinking Ensure daily in a effort to boost her albumin    She is tolerating it well     Discussion/Summary:    1  Nonfasting CMP  2   Return in 4 months    Patient Active Problem List   Diagnosis    Benign essential hypertension    Female bladder prolapse    Fibrocystic breast disease    Hypercalcemia    Incontinence    Kyphosis    Microproteinuria    Ventricular premature depolarization    Tinnitus    Paroxysmal atrial fibrillation (HCC)    Mixed hyperlipidemia    Type 2 diabetes mellitus with chronic kidney disease, with long-term current use of insulin (HCC)    Splenic artery aneurysm (HCC)    Thyroid nodule    Vitamin D deficiency    BMI 28 0-28 9,adult    Osteopenia of multiple sites    Stage 3a chronic kidney disease    Dysequilibrium    Anxiety    Palpitations    Hypoalbuminemia    Adjustment disorder with anxious mood     Past Medical History:   Diagnosis Date    Anemia     IRON DEF ANEMIA DUE TO MENORRHAGIA    [facility] mellitus ([facility] [address] )     Hypercalcemia 10/06/2010    MILD  DECREASES HCTZ    Hypertension     Melanoma ([facility] ) 11/2011    IN SITU  MID BACK    Palpitations 05/28/2018    SINUS TACHYCARDIA  TROPONIN SPLL    Skin cancer 2017    NOSE     Social History     Socioeconomic History    Marital status: /Civil Union     Spouse name: Not on file    Number of children: Not on file    Years of education: Not on file    Highest education level: Not on file   Occupational History    Not on file   Social Needs    Financial resource strain: Not on file    Food insecurity     Worry: Not on file     Inability: Not on file   [employer] needs     Medical: Not on file     Non-medical: Not on file   Tobacco Use    Smoking status: Never Smoker    Smokeless tobacco: Never Used   Substance and Sexual Activity    Alcohol use: No    Drug use: No    Sexual activity: Never   Lifestyle    Physical activity     Days per week: Not on file     Minutes per session: Not on file    Stress: Not on file   Relationships    Social connections     Talks on phone: Not on file     Gets together: Not on file     Attends Cheondoism service: Not on file     Active member of club or organization: Not on file     Attends meetings of clubs or organizations: Not on file     Relationship status: Not on file    Intimate partner violence     Fear of current or ex partner: Not on file     Emotionally abused: Not on file     Physically abused: Not on file     Forced sexual activity: Not on file   Other Topics Concern    Not on file   Social History Narrative    Not on file      Family History   Problem Relation Age of Onset    Other Mother         TESTED(-)    Colon cancer Father 61    Prostate cancer Father 79    Factor V Leiden deficiency Daughter     Factor V Leiden deficiency Daughter     Breast cancer Sister         in her 63's    No Known Problems Maternal Grandmother     No Known Problems Maternal Grandfather     No Known Problems Paternal Grandmother     No Known Problems Paternal Grandfather     No Known Problems Sister     No Known Problems Sister     No Known Problems Maternal Aunt     No Known Problems Maternal Aunt     No Known Problems Maternal Aunt     No Known Problems Maternal Aunt     No Known Problems Maternal Aunt     No Known Problems Maternal Aunt     No Known Problems Maternal Aunt     No Known Problems Paternal Aunt     No Known Problems Paternal Aunt     No Known Problems Paternal Aunt      Past Surgical History:   Procedure Laterality Date    GALLBLADDER SURGERY  1991    HYSTERECTOMY  1995    UTERINE PROLAPSE VAGINAL POLYPS    HYSTERECTOMY      IRON REPLACEMENT    OTHER SURGICAL HISTORY  11/2011    EXCISION MELANOMA IN SITU MID BACK    PARATHYROIDECTOMY  05/21/2018       Current Outpatient Medications:     atenolol (TENORMIN) 50 mg tablet, Take 1 tablet (50 mg total) by mouth 2 (two) times a day, Disp: 180 tablet, Rfl: 3    BD PEN NEEDLE RIZWAN U/F 32G X 4 MM MISC, by Other route daily, Disp: 100 each, Rfl: 3    diltiazem (CARDIZEM CD) 120 mg 24 hr capsule, Take 1 capsule (120 mg total) by mouth daily, Disp: 90 capsule, Rfl: 3    glucose blood (Accu-Chek SmartView) test strip, USE TO TEST BLOOD SUGAR ONCE A DAY, Disp: 100 each, Rfl: 3    hydrochlorothiazide (HYDRODIURIL) 12 5 mg tablet, Take 1 tablet (12 5 mg total) by mouth daily, Disp: 90 tablet, Rfl: 3    hydrOXYzine HCL (ATARAX) 25 mg tablet, TAKE 1 TABLET TWICE DAILY AS NEEDED FOR ANXIETY, Disp: 60 tablet, Rfl: 3    insulin detemir (Levemir FlexTouch) 100 Units/mL injection pen, Inject 14 Units under the skin daily at bedtime, Disp: 5 pen, Rfl: 3    lisinopril (ZESTRIL) 20 mg tablet, Take 1 tablet (20 mg total) by mouth daily, Disp: 90 tablet, Rfl: 3    MICROLET LANCETS MISC, by Does not apply route daily, Disp: 50 each, Rfl: 3    repaglinide (PRANDIN) 0 5 mg tablet, Take 0 5 mg by mouth 3 (three) times a day before meals, Disp: , Rfl:     rivaroxaban (Xarelto) 20 mg tablet, Take 1 tablet (20 mg total) by mouth every 24 hours, Disp: 90 tablet, Rfl: 3    simvastatin (ZOCOR) 20 mg tablet, Take 1 tablet (20 mg total) by mouth daily, Disp: 90 tablet, Rfl: 3    Vitamin D, Cholecalciferol, 50 MCG (2000 UT) CAPS, Take 1 capsule by mouth daily, Disp: , Rfl:   Allergies   Allergen Reactions    No Active Allergies        No results found for this visit on 01/20/21  Review of Systems:  Review of Systems   Respiratory: Negative for cough, choking, chest tightness, shortness of breath and wheezing  Cardiovascular: Negative for chest pain, palpitations and leg swelling  Musculoskeletal: Negative for gait problem  Skin: Negative for rash  Neurological: Negative for dizziness, tremors, syncope, weakness, light-headedness, numbness and headaches  Psychiatric/Behavioral: Negative for agitation and behavioral problems  The patient is not hyperactive          Physical Exam:  BP (!) 180/90   Pulse 100   Temp (!) 97 2 °F (36 2 °C)   Ht 5' 3" (1 6 m)   Wt 67 2 kg (148 lb 3 2 oz) SpO2 96%   BMI 26 25 kg/m²   Physical Exam  Vitals signs reviewed  Constitutional:       General: She is not in acute distress  Appearance: She is well-developed  HENT:      Head: Normocephalic and atraumatic  Neck:      Thyroid: No thyromegaly  Vascular: No carotid bruit or JVD  Cardiovascular:      Rate and Rhythm: Normal rate and regular rhythm  Heart sounds: Normal heart sounds  No murmur  No friction rub  No gallop  Pulmonary:      Effort: No respiratory distress  Breath sounds: No wheezing, rhonchi or rales  Musculoskeletal:      Right lower leg: No edema  Left lower leg: No edema  Skin:     General: Skin is warm and dry  Neurological:      General: No focal deficit present  Mental Status: She is alert and oriented to person, place, and time  Psychiatric:         Mood and Affect: Mood normal          Behavior: Behavior normal          Judgment: Judgment normal          --------------------------------------------------------------------------------  ECHO:   Results for orders placed in visit on 18   Echo complete with contrast if indicated    Narrative [phone] [facility]  [address] , [phone]    Cardiovascular Report  _________________________________________________________________         Transthoracic Echocardiogram Report  Maynor Lott    MRN:                 [id number]  Account :           [de-identified]  Accession :         [id number]  Exam Date:           2018 11:13  Patient Location:    [facility] 0  Ordering Phys:       Lori Delgado  Attending Phys:      Luis Lang  Technologist:        Terry Carranza    Age:  [de-identified]             :   1935           Gender:   F  Wt:   158 lb         Ht:    63 in  Indication:  Atrial fibrillation  BP:         /       HR:    Rhythm:              Sinus    Technical Quality:   Fair      MEASUREMENTS  2D ECHO  Body Surface Area                 1 8 m²  LV Diastolic Diameter PLAX        4 1 cm  LV Systolic Diameter PLAX         2 8 cm  IVS Diastolic Thickness           1 1 cm  LVPW Diastolic Thickness          1 0 cm  LV Relative Wall Thickness        0 5  RV Internal Dim ED PLAX           3 4 cm  Aortic Root Diameter              3 2 cm  LA Systolic Diameter LX           3 8 cm  LA Area                           21 1 cm²  LV Ejection Fraction MOD 4C       67 7 %  LA Volume AL                      68 5 cm³  LA Volume AL Index                38 0 cm³/m²  RA Area 4C View                   13 8 cm²    DOPPLER  AV Peak Velocity                  130 8 cm/s  AV Peak Gradient                  6 8 mmHg  LVOT Peak Velocity                103 7 cm/s  LVOT Peak Gradient                4 3 mmHg  MV Area PHT                       3 1 cm²  Mitral E Point Velocity           103 2 cm/s  Mitral A Point Velocity           99 7 cm/s  Mitral E to A Ratio               1 0  MV E' Velocity                    8 0 cm/s  Mitral E to MV E' Ratio           12 9  TR Peak Velocity                  207 7 cm/s  TR Peak Gradient                  17 2 mmHg  TAPSE                             2 3 cm  Right Atrial Pressure             5 0 mmHg  Pulmonary Artery Systolic Pressu  88 0 mmHg  Right Ventricular Systolic Press  23 6 mmHg  PV Peak Velocity                  96 1 cm/s  PV Peak Gradient                  3 7 mmHg      FINDINGS  Left Ventricle  Normal left ventricular systolic function, EF estimated at 60-  65%  Normal left ventricular cavity size  Normal left  ventricular wall thickness  Normal left ventricular wall motion  Indeterminate left ventricular diastolic function  Normal left  ventricular filling pressures  Right Ventricle  Normal right ventricular cavity size and systolic function,  right ventricular systolic pressure, 99 3 mmHg  Normal pulmonary  arterial systolic pressure  Right Atrium  Normal right atrial size      Left Atrium  Mild-to-moderate left atrial enlargement  Mitral Valve  Trace mitral regurgitation  Aortic Valve  Aortic sclerosis without stenosis  Tricuspid Valve  Trace tricuspid regurgitation  Pulmonic Valve  No pulmonic regurgitation  Pericardium  No pericardial effusion  Aorta  Normal aortic root size  CONCLUSIONS  Normal left ventricular systolic function, EF estimated at 60-  65%  Indeterminate left ventricular diastolic function  Normal left ventricular filling pressures  Mild-to-moderate left atrial enlargement  Aortic sclerosis without stenosis  Hiro Gray MD  (Electronically Signed)  Final Date:      23 May 2018 16:28  CV Report                    Carin Martinez       [id number]  Final                                                     Page 3     No results found for this or any previous visit   --------------------------------------------------------------------------------  HOLTER  Results for orders placed during the hospital encounter of 20   Holter monitor - 24 hour    Narrative PT NAME: Melanie Leiva  : 1935  AGE: 82 y o  GENDER: female  MRN: [id number]   PROCEDURE: Holter monitor - 24 hour        INDICATIONS:   PALPITATIONS        Impression 1  The patient had predominantly sinus rhythm with minor 1st degree AV   block  2  Heart rate varied from 56 bpm to 86 bpm   The patients average heart   rate was 66 bpm     3  The patient had a holter monitor tracing done for 23 hours and 59   minutes  4  The patient had 12 single ventricular ectopic activity versus Lucero   Phenomenon  5  The patient had 3 single supraventricular ectopy  6  The longest R/R interval was 1 4 seconds  7  No other major arrhythmia was noted    8  No entries were made in the diary attached           ======================================================    Lab Results   Component Value Date    WBC 10 90 (H) 2019    HGB 13 6 2019    HCT 42 6 2019    MCV 93 2019     2019      Lab Results Component Value Date    SODIUM 140 10/19/2020    K 4 0 10/19/2020     10/19/2020    CO2 32 10/19/2020    BUN 21 10/19/2020    CREATININE 0 96 10/19/2020    GLUC 185 (H) 10/19/2020    CALCIUM 8 9 10/19/2020      Lab Results   Component Value Date    HGBA1C 9 2 (A) 01/05/2021      Lab Results   Component Value Date    CHOL 128 05/24/2018     Lab Results   Component Value Date    HDL 61 07/31/2020    HDL 63 12/30/2019    HDL 55 06/18/2019     Lab Results   Component Value Date    LDLCALC 64 07/31/2020    LDLCALC 79 12/30/2019    LDLCALC 79 06/18/2019     Lab Results   Component Value Date    TRIG 46 07/31/2020    TRIG 50 12/30/2019    TRIG 49 06/18/2019         Imaging:   I have personally reviewed pertinent reports  Portions of the record may have been created with voice recognition software  Occasional wrong word or "sound a like" substitutions may have occurred due to the inherent limitations of voice recognition software  Read the chart carefully and recognize, using context, where substitutions have occurred

## 2021-02-03 ENCOUNTER — TELEPHONE (OUTPATIENT)
Dept: FAMILY MEDICINE CLINIC | Facility: CLINIC | Age: 86
End: 2021-02-03

## 2021-02-03 DIAGNOSIS — Z79.4 TYPE 2 DIABETES MELLITUS WITH STAGE 1 CHRONIC KIDNEY DISEASE, WITH LONG-TERM CURRENT USE OF INSULIN (HCC): Primary | ICD-10-CM

## 2021-02-03 DIAGNOSIS — E11.22 TYPE 2 DIABETES MELLITUS WITH STAGE 1 CHRONIC KIDNEY DISEASE, WITH LONG-TERM CURRENT USE OF INSULIN (HCC): Primary | ICD-10-CM

## 2021-02-03 DIAGNOSIS — N18.1 TYPE 2 DIABETES MELLITUS WITH STAGE 1 CHRONIC KIDNEY DISEASE, WITH LONG-TERM CURRENT USE OF INSULIN (HCC): Primary | ICD-10-CM

## 2021-02-03 RX ORDER — DULAGLUTIDE 0.75 MG/.5ML
0.75 INJECTION, SOLUTION SUBCUTANEOUS WEEKLY
Qty: 5 PEN | Refills: 5 | Status: SHIPPED | OUTPATIENT
Start: 2021-02-03 | End: 2021-07-28 | Stop reason: SDUPTHER

## 2021-02-03 NOTE — TELEPHONE ENCOUNTER
Spoke with patient - states that she called [facility] and found out that they never got an order for Trulicity   Question is where did [facility] send the request

## 2021-02-03 NOTE — TELEPHONE ENCOUNTER
Spoke with pharmacist at [facility] - states that trulicity is inactive since it is been awhile since medication was refilled

## 2021-02-03 NOTE — TELEPHONE ENCOUNTER
What other drugs are available in that drug class that are covered  The drug class is considered GLP-1  She needs to ask her insurance or she needs to ask the pharmacy

## 2021-02-03 NOTE — TELEPHONE ENCOUNTER
Patient called stating that her Trulicity is not covered by her insurance  Last seen 1/5/21  Repaglinide was stopped and her Levemir was decrease to 10 units  Patient wants to know what will be the next step?

## 2021-02-04 ENCOUNTER — TELEPHONE (OUTPATIENT)
Dept: FAMILY MEDICINE CLINIC | Facility: CLINIC | Age: 86
End: 2021-02-04

## 2021-02-04 NOTE — TELEPHONE ENCOUNTER
Patient was given advised to call her insurance to find out which one is covered - Zaida Mari or Leonard

## 2021-02-08 PROBLEM — N18.31 TYPE 2 DIABETES MELLITUS WITH STAGE 3A CHRONIC KIDNEY DISEASE, WITH LONG-TERM CURRENT USE OF INSULIN (HCC): Status: ACTIVE | Noted: 2018-08-22

## 2021-02-08 NOTE — PROGRESS NOTES
Assessment & Plan:     E04 1  Thyroid nodule  I have evaluated the patient and found the condition to be: Stable  I have evaluated the patient and: Recommended continue with same treatment plan  The patient should continue treatment and follow-up with: 11/02/2020-ultrasound of the thyroid shows 3 small nodules, all < 1cm,  which require no FNA or future imaging  E11 22, N18 31, Z79 4 Type 2 diabetes mellitus with chronic kidney disease, with long-term current use of insulin ([facility] )  I have evaluated the patient and found the condition to be: Worsening  I have evaluated the patient and: Recommended continue with same treatment plan and Adjusted medications as indicated  The patient should continue treatment and follow-up with: Staring Trulicity because of rising HbA1c     I72 8  Splenic artery aneurysm ([facility] )  I have evaluated the patient and found the condition to be: Stable  I have evaluated the patient and: Recommended continue with same treatment plan  The patient should continue treatment and follow-up with: Followed yearly by [facility] surgeon  Ultrasound as of 09/2020  I48 0  Paroxysmal atrial fibrillation (HCC)  I have evaluated the patient and found the condition to be: Stable  I have evaluated the patient and: Recommended continue with same treatment plan  The patient should continue treatment and follow-up with: Followed by Cardiology  No change in meds, as of last CARDIO visit on 1/22/21  I10  Benign essential hypertension  I have evaluated the patient and found the condition to be: Stable  I have evaluated the patient and: Recommended continue with same treatment plan  The patient should continue treatment and follow-up with: Has an element of white coat hypertension  Medications reconciled and home blood pressure checks are good  M85 89  Osteopenia of multiple sites  I have evaluated the patient and found the condition to be:  Stable  I have evaluated the patient and: Recommended continue with same treatment plan    E55 9  Vitamin D deficiency  I have evaluated the patient and found the condition to be: Stable  I have evaluated the patient and: Recommended continue with same treatment plan  The patient should continue treatment and follow-up with: Still takes a daily vitamin D supplement  E78 2  Mixed hyperlipidemia  I have evaluated the patient and found the condition to be: Stable  I have evaluated the patient and: Recommended continue with same treatment plan  The patient should continue treatment and follow-up with: Lipid panel has been good on present dosage of simvistatin    F43 22  Adjustment disorder with anxious mood  I have evaluated the patient and found the condition to be: Stable  I have evaluated the patient and: Recommended continue with same treatment plan  The patient should continue treatment and follow-up with: Has improved  Only needs the Atarax occasionally  R42  Dysequilibrium  I have evaluated the patient and found the condition to be: Resolved  I have evaluated the patient and: Recommended continue with same treatment plan    E88 09  Hypoalbuminemia  I have evaluated the patient and found the condition to be: Stable  I have evaluated the patient and: Recommended continue with same treatment plan  The patient should continue treatment and follow-up with: Cardiology put her on a daily protein calorie supplement  N60 19  Fibrocystic breast disease  I have evaluated the patient and found the condition to be: Stable  I have evaluated the patient and: Recommended continue with same treatment plan  The patient should continue treatment and follow-up with: Mammo normal - 9/2020  M40 209  Kyphosis  I have evaluated the patient and found the condition to be: Stable  I have evaluated the patient and: Recommended continue with same treatment plan  The patient should continue treatment and follow-up with: asymptomatic and requires no treatment           Subjective:     Patient ID: Kaia Ferrer Amina Vargas is a 82 y o  female     Chief Complaint   Patient presents with    Depression    Hypertension      HPI :  Enhanced visit and also multiple chronic medical problems particularly uncontrolled diabetes  Review of Systems   Constitutional: Negative for activity change, appetite change, fatigue, fever and unexpected weight change  HENT: Negative for congestion, dental problem and sneezing  Eyes: Negative for discharge and visual disturbance  Respiratory: Negative for cough, chest tightness, shortness of breath and wheezing  Cardiovascular: Negative for chest pain, palpitations and leg swelling  Gastrointestinal: Negative for abdominal pain, constipation, diarrhea, nausea and vomiting  Endocrine: Negative for polydipsia and polyuria  Genitourinary: Negative for dysuria and frequency  Musculoskeletal: Negative for arthralgias and gait problem  Skin: Negative for rash  Allergic/Immunologic: Negative for environmental allergies and food allergies  Neurological: Negative for light-headedness and headaches  Hematological: Negative for adenopathy  Psychiatric/Behavioral: Negative for behavioral problems and sleep disturbance  All other systems reviewed and are negative  Objective:      /88 (BP Location: Left arm, Patient Position: Sitting, Cuff Size: Standard)   Pulse 79   Temp 97 6 °F (36 4 °C) (Tympanic)   Resp 18   Ht 5' 3" (1 6 m)   Wt 67 3 kg (148 lb 6 4 oz)   SpO2 96%   BMI 26 29 kg/m²     Problem List Items Addressed This Visit        Endocrine    Thyroid nodule     11/02/2020-ultrasound of the thyroid shows 3 small nodules, all < 1cm,  which require no FNA or future imaging         Type 2 diabetes mellitus with stage 3a chronic kidney disease, with long-term current use of insulin (HCC)     Stop using Ensure or boost as a protein calorie supplement  They will only raise your blood sugar    There is a protein calorie supplement that diabetics can use and its called [facility]  Lab Results   Component Value Date    HGBA1C 9 2 (A) 01/05/2021   HBSM: Only testing FBS and the range is 140s to 180s  Meds reconciled  She watches her diet and carbohydrates  Had not been able to tolerate metformin, in the past, due to diarrhea  Currently, Levemir is at 14 units once a day HS  Brought Trulicity with her today so we could go over the proper administration  Relevant Orders    Hemoglobin B0D    Basic metabolic panel       Cardiovascular and Mediastinum    Benign essential hypertension     HBPM: SBP= 120s to 140s and the DBP = 60s & 70s  Asymptomatic  Probably has an element of White Coat Hypertension  Blood pressure is controlled on present treatment regimen  Patient misses no doses and tolerates the meds without side effects  Patient avoids salt  Discussed diet, exercise and weight control  No change in present meds  Continue HBPM          Paroxysmal atrial fibrillation ([facility] [address] )     01/20/2021-visit with Cardiology  No change in treatment  Splenic artery aneurysm ([facility] [address] )     OV with [facility]ASC - 9/23/20 - stable aneurysm  Ventricular premature depolarization     Asymptomatic and followed by Cardiology  Last visit with Cardiology was 01/20/2021  No change in meds  Genitourinary    [place] grade 2 cystocele     Latest gyn visit was 01/12/2021  Since the problem is asymptomatic no use of pessary or surgical intervention is planned unless situation worsens or changes  Prolapse of anterior vaginal wall - Primary     Last gyn check was 01/12/2021  No intervention since this problem is asymptomatic at present           Stage 3a chronic kidney disease     Lab Results   Component Value Date    EGFR 55 10/19/2020    EGFR 57 07/31/2020    EGFR 55 04/13/2020    CREATININE 0 96 10/19/2020    CREATININE 0 93 07/31/2020    CREATININE 0 96 04/13/2020   10/01/20: GFR = 66 and creat = 0 82   10/15/20: GFR = 58 and creat = 0 92   10/19/20: GFR = 55 and creat = 0 96         Relevant Orders    Basic metabolic panel          Physical Exam  Constitutional:       Appearance: Normal appearance  She is well-developed  She is not ill-appearing  HENT:      Head: Normocephalic and atraumatic  Eyes:      General: No scleral icterus  Conjunctiva/sclera: Conjunctivae normal       Pupils: Pupils are equal, round, and reactive to light  Neck:      Musculoskeletal: Normal range of motion and neck supple  Thyroid: No thyromegaly  Cardiovascular:      Rate and Rhythm: Normal rate and regular rhythm  Heart sounds: Normal heart sounds  No murmur  Pulmonary:      Effort: Pulmonary effort is normal       Breath sounds: Normal breath sounds  No wheezing or rales  Abdominal:      General: Bowel sounds are normal       Palpations: Abdomen is soft  There is no mass  Tenderness: There is no abdominal tenderness  Musculoskeletal: Normal range of motion  General: No tenderness  Right lower leg: No edema  Left lower leg: No edema  Lymphadenopathy:      Cervical: No cervical adenopathy  Skin:     General: Skin is warm and dry  Findings: No rash  Neurological:      General: No focal deficit present  Mental Status: She is alert and oriented to person, place, and time  Psychiatric:         Mood and Affect: Mood normal          Behavior: Behavior normal          Thought Content: Thought content normal          Judgment: Judgment normal        Patient Instructions   Medications to stay the same and you can to the Trulicity injection once a week, as I demonstrated for you today  If you have any problems let me know  Continue home blood pressure and blood sugar checks  Follow-up as recommended

## 2021-02-10 ENCOUNTER — TELEPHONE (OUTPATIENT)
Dept: FAMILY MEDICINE CLINIC | Facility: CLINIC | Age: 86
End: 2021-02-10

## 2021-02-10 ENCOUNTER — OFFICE VISIT (OUTPATIENT)
Dept: FAMILY MEDICINE CLINIC | Facility: CLINIC | Age: 86
End: 2021-02-10
Payer: COMMERCIAL

## 2021-02-10 VITALS
DIASTOLIC BLOOD PRESSURE: 94 MMHG | HEIGHT: 63 IN | OXYGEN SATURATION: 95 % | RESPIRATION RATE: 16 BRPM | TEMPERATURE: 97.2 F | WEIGHT: 148.4 LBS | SYSTOLIC BLOOD PRESSURE: 150 MMHG | HEART RATE: 88 BPM | BODY MASS INDEX: 26.29 KG/M2

## 2021-02-10 DIAGNOSIS — R30.0 DYSURIA: Primary | ICD-10-CM

## 2021-02-10 LAB
BACTERIA UR QL AUTO: ABNORMAL /HPF
BILIRUB UR QL STRIP: NEGATIVE
CLARITY UR: ABNORMAL
COLOR UR: YELLOW
GLUCOSE UR STRIP-MCNC: ABNORMAL MG/DL
HGB UR QL STRIP.AUTO: ABNORMAL
HYALINE CASTS #/AREA URNS LPF: ABNORMAL /LPF
KETONES UR STRIP-MCNC: NEGATIVE MG/DL
LEUKOCYTE ESTERASE UR QL STRIP: ABNORMAL
NITRITE UR QL STRIP: NEGATIVE
NON-SQ EPI CELLS URNS QL MICRO: ABNORMAL /HPF
PH UR STRIP.AUTO: 6 [PH]
PROT UR STRIP-MCNC: NEGATIVE MG/DL
RBC #/AREA URNS AUTO: ABNORMAL /HPF
SL AMB  POCT GLUCOSE, UA: 250
SL AMB LEUKOCYTE ESTERASE,UA: 70
SL AMB POCT BILIRUBIN,UA: NEGATIVE
SL AMB POCT BLOOD,UA: ABNORMAL
SL AMB POCT CLARITY,UA: ABNORMAL
SL AMB POCT COLOR,UA: YELLOW
SL AMB POCT KETONES,UA: 5
SL AMB POCT NITRITE,UA: NEGATIVE
SL AMB POCT PH,UA: 5
SL AMB POCT SPECIFIC GRAVITY,UA: 1.01
SL AMB POCT URINE PROTEIN: 15
SL AMB POCT UROBILINOGEN: NEGATIVE
SP GR UR STRIP.AUTO: 1.01 (ref 1–1.03)
UROBILINOGEN UR QL STRIP.AUTO: 0.2 E.U./DL
WBC #/AREA URNS AUTO: ABNORMAL /HPF

## 2021-02-10 PROCEDURE — 87086 URINE CULTURE/COLONY COUNT: CPT | Performed by: NURSE PRACTITIONER

## 2021-02-10 PROCEDURE — 81002 URINALYSIS NONAUTO W/O SCOPE: CPT | Performed by: NURSE PRACTITIONER

## 2021-02-10 PROCEDURE — 81001 URINALYSIS AUTO W/SCOPE: CPT | Performed by: NURSE PRACTITIONER

## 2021-02-10 PROCEDURE — 3077F SYST BP >= 140 MM HG: CPT | Performed by: NURSE PRACTITIONER

## 2021-02-10 PROCEDURE — 3080F DIAST BP >= 90 MM HG: CPT | Performed by: NURSE PRACTITIONER

## 2021-02-10 PROCEDURE — 99213 OFFICE O/P EST LOW 20 MIN: CPT | Performed by: NURSE PRACTITIONER

## 2021-02-10 PROCEDURE — 87077 CULTURE AEROBIC IDENTIFY: CPT | Performed by: NURSE PRACTITIONER

## 2021-02-10 NOTE — TELEPHONE ENCOUNTER
patient called and states she thinks she has a UTI and was looking for an appt today  There are no openings with Nabeel Conn  Could you call her back?

## 2021-02-10 NOTE — PROGRESS NOTES
Subjective:   Chief Complaint   Patient presents with    Urinary Tract Infection        Patient ID: Marybeth Valverde is a 82 y o  female  Presents today for symptoms of urinary tract infection  Denies any pain or bleeding with urination  States a slight increase in frequency and reports a "foul" odor when she urinates  She is experiencing some pressure in her lower abdomen  She states she has a prolapsed bladder  She was treated with antibiotics for UTI in October and December  No known allergies  The following portions of the patient's history were reviewed and updated as appropriate: allergies, current medications, past family history, past medical history, past social history, past surgical history and problem list     Review of Systems   Constitutional: Negative for chills, fatigue and fever  HENT: Positive for rhinorrhea and sneezing  Negative for postnasal drip, sinus pressure, sinus pain and sore throat  Respiratory: Negative for cough and shortness of breath  Cardiovascular: Negative for chest pain  Gastrointestinal: Positive for abdominal pain (lower quadrants)  Negative for diarrhea, nausea and vomiting  Genitourinary: Positive for difficulty urinating, frequency and urgency  Negative for dysuria, flank pain and vaginal bleeding  Musculoskeletal: Negative for back pain  Psychiatric/Behavioral: Negative for behavioral problems  Objective:  Vitals:    02/10/21 1100   BP: 150/94   BP Location: Left arm   Patient Position: Sitting   Cuff Size: Adult   Pulse: 88   Resp: 16   Temp: (!) 97 2 °F (36 2 °C)   TempSrc: Tympanic   SpO2: 95%   Weight: 67 3 kg (148 lb 6 4 oz)   Height: 5' 3" (1 6 m)      Physical Exam  Constitutional:       Appearance: Normal appearance  Cardiovascular:      Rate and Rhythm: Normal rate and regular rhythm  Pulmonary:      Effort: Pulmonary effort is normal       Breath sounds: Normal breath sounds     Abdominal:      General: Bowel sounds are normal       Tenderness: There is abdominal tenderness in the suprapubic area  There is no right CVA tenderness or left CVA tenderness  Skin:     General: Skin is warm and dry  Neurological:      Mental Status: She is alert and oriented to person, place, and time  Psychiatric:         Mood and Affect: Mood normal          Behavior: Behavior normal            Assessment/Plan:    No problem-specific Assessment & Plan notes found for this encounter  Diagnoses and all orders for this visit:    Dysuria  Comments:  Possibly related to prolasped bladder   Encourged to increase water intake  Orders:  -     UA w Reflex to Microscopic w Reflex to Culture  -     POCT urine dip

## 2021-02-11 DIAGNOSIS — N39.0 URINARY TRACT INFECTION WITHOUT HEMATURIA, SITE UNSPECIFIED: Primary | ICD-10-CM

## 2021-02-11 RX ORDER — SULFAMETHOXAZOLE AND TRIMETHOPRIM 800; 160 MG/1; MG/1
1 TABLET ORAL 2 TIMES DAILY
Qty: 6 TABLET | Refills: 0 | Status: SHIPPED | OUTPATIENT
Start: 2021-02-11 | End: 2021-02-16 | Stop reason: ALTCHOICE

## 2021-02-12 DIAGNOSIS — N39.0 URINARY TRACT INFECTION WITHOUT HEMATURIA, SITE UNSPECIFIED: Primary | ICD-10-CM

## 2021-02-12 LAB — BACTERIA UR CULT: ABNORMAL

## 2021-02-12 RX ORDER — CEPHALEXIN 500 MG/1
500 CAPSULE ORAL EVERY 12 HOURS SCHEDULED
Qty: 14 CAPSULE | Refills: 0 | Status: SHIPPED | OUTPATIENT
Start: 2021-02-12 | End: 2021-02-19

## 2021-02-16 ENCOUNTER — OFFICE VISIT (OUTPATIENT)
Dept: FAMILY MEDICINE CLINIC | Facility: CLINIC | Age: 86
End: 2021-02-16
Payer: COMMERCIAL

## 2021-02-16 VITALS
SYSTOLIC BLOOD PRESSURE: 168 MMHG | BODY MASS INDEX: 26.29 KG/M2 | RESPIRATION RATE: 18 BRPM | OXYGEN SATURATION: 96 % | HEART RATE: 79 BPM | TEMPERATURE: 97.6 F | HEIGHT: 63 IN | DIASTOLIC BLOOD PRESSURE: 88 MMHG | WEIGHT: 148.4 LBS

## 2021-02-16 DIAGNOSIS — N81.10 BADEN-WALKER GRADE 2 CYSTOCELE: ICD-10-CM

## 2021-02-16 DIAGNOSIS — I49.3 VENTRICULAR PREMATURE DEPOLARIZATION: ICD-10-CM

## 2021-02-16 DIAGNOSIS — I10 BENIGN ESSENTIAL HYPERTENSION: ICD-10-CM

## 2021-02-16 DIAGNOSIS — I48.0 PAROXYSMAL ATRIAL FIBRILLATION (HCC): ICD-10-CM

## 2021-02-16 DIAGNOSIS — E04.1 THYROID NODULE: ICD-10-CM

## 2021-02-16 DIAGNOSIS — N18.31 STAGE 3A CHRONIC KIDNEY DISEASE (HCC): ICD-10-CM

## 2021-02-16 DIAGNOSIS — E11.22 TYPE 2 DIABETES MELLITUS WITH STAGE 3A CHRONIC KIDNEY DISEASE, WITH LONG-TERM CURRENT USE OF INSULIN (HCC): ICD-10-CM

## 2021-02-16 DIAGNOSIS — I72.8 SPLENIC ARTERY ANEURYSM (HCC): ICD-10-CM

## 2021-02-16 DIAGNOSIS — N81.10 PROLAPSE OF ANTERIOR VAGINAL WALL: Primary | ICD-10-CM

## 2021-02-16 DIAGNOSIS — N18.31 TYPE 2 DIABETES MELLITUS WITH STAGE 3A CHRONIC KIDNEY DISEASE, WITH LONG-TERM CURRENT USE OF INSULIN (HCC): ICD-10-CM

## 2021-02-16 DIAGNOSIS — Z79.4 TYPE 2 DIABETES MELLITUS WITH STAGE 3A CHRONIC KIDNEY DISEASE, WITH LONG-TERM CURRENT USE OF INSULIN (HCC): ICD-10-CM

## 2021-02-16 PROCEDURE — 99214 OFFICE O/P EST MOD 30 MIN: CPT | Performed by: FAMILY MEDICINE

## 2021-02-16 PROCEDURE — 1036F TOBACCO NON-USER: CPT | Performed by: FAMILY MEDICINE

## 2021-02-16 PROCEDURE — 1160F RVW MEDS BY RX/DR IN RCRD: CPT | Performed by: FAMILY MEDICINE

## 2021-02-16 PROCEDURE — T1015 CLINIC SERVICE: HCPCS | Performed by: FAMILY MEDICINE

## 2021-02-16 NOTE — PATIENT INSTRUCTIONS
Medications to stay the same and you can to the Trulicity injection once a week, as I demonstrated for you today  If you have any problems let me know  Continue home blood pressure and blood sugar checks  Follow-up as recommended

## 2021-02-16 NOTE — PROGRESS NOTES
Patient's shoes and socks removed  Right Foot/Ankle   Right Foot Inspection  Skin Exam: skin normal and skin intact no dry skin, no warmth, no callus, no erythema, no maceration, no abnormal color, no pre-ulcer, no ulcer and no callus                          Toe Exam: ROM and strength within normal limits  Sensory       Monofilament testing: intact  Vascular    The right DP pulse is 1+  The right PT pulse is 1+  Left Foot/Ankle  Left Foot Inspection  Skin Exam: skin normal and skin intactno dry skin, no warmth, no erythema, no maceration, normal color, no pre-ulcer, no ulcer and no callus                         Toe Exam: ROM and strength within normal limits                   Sensory       Monofilament: intact  Vascular    The left DP pulse is 1+  The left PT pulse is 1+  Assign Risk Category:  Deformity present;  No loss of protective sensation; Weak pulses       Risk: 1

## 2021-02-16 NOTE — ASSESSMENT & PLAN NOTE
Stop using Ensure or boost as a protein calorie supplement  They will only raise your blood sugar  There is a protein calorie supplement that diabetics can use and its called [facility]  Lab Results   Component Value Date    HGBA1C 9 2 (A) 01/05/2021   HBSM: Only testing FBS and the range is 140s to 180s  Meds reconciled  She watches her diet and carbohydrates  Had not been able to tolerate metformin, in the past, due to diarrhea  Currently, Levemir is at 14 units once a day HS  Brought Trulicity with her today so we could go over the proper administration

## 2021-02-16 NOTE — ASSESSMENT & PLAN NOTE
11/02/2020-ultrasound of the thyroid shows 3 small nodules, all < 1cm,  which require no FNA or future imaging

## 2021-02-16 NOTE — ASSESSMENT & PLAN NOTE
HBPM: SBP= 120s to 140s and the DBP = 60s & 70s  Asymptomatic  Probably has an element of White Coat Hypertension  Blood pressure is controlled on present treatment regimen  Patient misses no doses and tolerates the meds without side effects  Patient avoids salt  Discussed diet, exercise and weight control  No change in present meds   Continue HBPM

## 2021-02-16 NOTE — ASSESSMENT & PLAN NOTE
Asymptomatic and followed by Cardiology  Last visit with Cardiology was 01/20/2021  No change in meds

## 2021-02-16 NOTE — ASSESSMENT & PLAN NOTE
Lab Results   Component Value Date    EGFR 55 10/19/2020    EGFR 57 07/31/2020    EGFR 55 04/13/2020    CREATININE 0 96 10/19/2020    CREATININE 0 93 07/31/2020    CREATININE 0 96 04/13/2020   10/01/20: GFR = 66 and creat = 0 82   10/15/20: GFR = 58 and creat = 0 92   10/19/20: GFR = 55 and creat = 0 96

## 2021-02-16 NOTE — ASSESSMENT & PLAN NOTE
Latest gyn visit was 01/12/2021  Since the problem is asymptomatic no use of pessary or surgical intervention is planned unless situation worsens or changes

## 2021-03-18 DIAGNOSIS — E11.9 TYPE 2 DIABETES MELLITUS WITHOUT COMPLICATION, UNSPECIFIED WHETHER LONG TERM INSULIN USE (HCC): ICD-10-CM

## 2021-03-18 RX ORDER — PEN NEEDLE, DIABETIC 32GX 5/32"
NEEDLE, DISPOSABLE MISCELLANEOUS
Qty: 100 EACH | Refills: 3 | Status: SHIPPED | OUTPATIENT
Start: 2021-03-18 | End: 2022-05-27 | Stop reason: SDUPTHER

## 2021-04-08 ENCOUNTER — RA CDI HCC (OUTPATIENT)
Dept: OTHER | Facility: HOSPITAL | Age: 86
End: 2021-04-08

## 2021-04-08 NOTE — PROGRESS NOTES
[place] [id number]  coding oppertunities             Chart reviewed, (number of) suggestions sent to provider: 1     Problem listed updated   Provider Accepted, (number of) suggestions accepted: 1              [place] [id number]  coding oppertunities             Chart reviewed, (number of) suggestions sent to provider: 1      DX:  E11 65-Type 2 diabetes mellitus with [id number] 2, 8 1

## 2021-04-09 ENCOUNTER — CLINICAL SUPPORT (OUTPATIENT)
Dept: FAMILY MEDICINE CLINIC | Facility: CLINIC | Age: 86
End: 2021-04-09
Payer: COMMERCIAL

## 2021-04-09 DIAGNOSIS — E11.22 TYPE 2 DIABETES MELLITUS WITH STAGE 3A CHRONIC KIDNEY DISEASE, WITH LONG-TERM CURRENT USE OF INSULIN (HCC): ICD-10-CM

## 2021-04-09 DIAGNOSIS — Z79.4 TYPE 2 DIABETES MELLITUS WITH STAGE 3A CHRONIC KIDNEY DISEASE, WITH LONG-TERM CURRENT USE OF INSULIN (HCC): ICD-10-CM

## 2021-04-09 DIAGNOSIS — N18.31 TYPE 2 DIABETES MELLITUS WITH STAGE 3A CHRONIC KIDNEY DISEASE, WITH LONG-TERM CURRENT USE OF INSULIN (HCC): ICD-10-CM

## 2021-04-09 DIAGNOSIS — Z12.31 ENCOUNTER FOR SCREENING MAMMOGRAM FOR BREAST CANCER: ICD-10-CM

## 2021-04-09 DIAGNOSIS — N18.31 STAGE 3A CHRONIC KIDNEY DISEASE (HCC): ICD-10-CM

## 2021-04-09 DIAGNOSIS — E88.09 HYPOALBUMINEMIA: ICD-10-CM

## 2021-04-09 PROBLEM — E11.65 TYPE 2 DIABETES MELLITUS WITH HYPERGLYCEMIA (HCC): Status: ACTIVE | Noted: 2021-04-09

## 2021-04-09 LAB
ALBUMIN SERPL BCP-MCNC: 3.6 G/DL (ref 3.5–5)
ALP SERPL-CCNC: 100 U/L (ref 46–116)
ALT SERPL W P-5'-P-CCNC: 24 U/L (ref 12–78)
ANION GAP SERPL CALCULATED.3IONS-SCNC: 3 MMOL/L (ref 4–13)
AST SERPL W P-5'-P-CCNC: 19 U/L (ref 5–45)
BILIRUB SERPL-MCNC: 0.83 MG/DL (ref 0.2–1)
BUN SERPL-MCNC: 15 MG/DL (ref 5–25)
CALCIUM SERPL-MCNC: 10.1 MG/DL (ref 8.3–10.1)
CHLORIDE SERPL-SCNC: 101 MMOL/L (ref 100–108)
CO2 SERPL-SCNC: 31 MMOL/L (ref 21–32)
CREAT SERPL-MCNC: 0.88 MG/DL (ref 0.6–1.3)
EST. AVERAGE GLUCOSE BLD GHB EST-MCNC: 169 MG/DL
GFR SERPL CREATININE-BSD FRML MDRD: 61 ML/MIN/1.73SQ M
GLUCOSE P FAST SERPL-MCNC: 114 MG/DL (ref 65–99)
HBA1C MFR BLD: 7.5 %
POTASSIUM SERPL-SCNC: 4 MMOL/L (ref 3.5–5.3)
PROT SERPL-MCNC: 7.8 G/DL (ref 6.4–8.2)
SODIUM SERPL-SCNC: 135 MMOL/L (ref 136–145)

## 2021-04-09 PROCEDURE — 83036 HEMOGLOBIN GLYCOSYLATED A1C: CPT | Performed by: FAMILY MEDICINE

## 2021-04-09 PROCEDURE — 80053 COMPREHEN METABOLIC PANEL: CPT | Performed by: FAMILY MEDICINE

## 2021-04-09 PROCEDURE — 36415 COLL VENOUS BLD VENIPUNCTURE: CPT

## 2021-04-14 ENCOUNTER — OFFICE VISIT (OUTPATIENT)
Dept: FAMILY MEDICINE CLINIC | Facility: CLINIC | Age: 86
End: 2021-04-14
Payer: COMMERCIAL

## 2021-04-14 VITALS
BODY MASS INDEX: 25.73 KG/M2 | RESPIRATION RATE: 18 BRPM | TEMPERATURE: 96.6 F | OXYGEN SATURATION: 98 % | HEIGHT: 63 IN | SYSTOLIC BLOOD PRESSURE: 142 MMHG | HEART RATE: 86 BPM | DIASTOLIC BLOOD PRESSURE: 86 MMHG | WEIGHT: 145.2 LBS

## 2021-04-14 DIAGNOSIS — E11.22 TYPE 2 DIABETES MELLITUS WITH STAGE 3A CHRONIC KIDNEY DISEASE, WITH LONG-TERM CURRENT USE OF INSULIN (HCC): ICD-10-CM

## 2021-04-14 DIAGNOSIS — N18.31 TYPE 2 DIABETES MELLITUS WITH STAGE 3A CHRONIC KIDNEY DISEASE, WITH LONG-TERM CURRENT USE OF INSULIN (HCC): ICD-10-CM

## 2021-04-14 DIAGNOSIS — E55.9 VITAMIN D DEFICIENCY: ICD-10-CM

## 2021-04-14 DIAGNOSIS — I10 BENIGN ESSENTIAL HYPERTENSION: ICD-10-CM

## 2021-04-14 DIAGNOSIS — E11.9 TYPE 2 DIABETES MELLITUS WITHOUT COMPLICATION, WITHOUT LONG-TERM CURRENT USE OF INSULIN (HCC): Primary | ICD-10-CM

## 2021-04-14 DIAGNOSIS — Z79.4 TYPE 2 DIABETES MELLITUS WITH STAGE 3A CHRONIC KIDNEY DISEASE, WITH LONG-TERM CURRENT USE OF INSULIN (HCC): ICD-10-CM

## 2021-04-14 DIAGNOSIS — Z79.4 TYPE 2 DIABETES MELLITUS WITH HYPERGLYCEMIA, WITH LONG-TERM CURRENT USE OF INSULIN (HCC): ICD-10-CM

## 2021-04-14 DIAGNOSIS — E11.65 TYPE 2 DIABETES MELLITUS WITH HYPERGLYCEMIA, WITH LONG-TERM CURRENT USE OF INSULIN (HCC): ICD-10-CM

## 2021-04-14 DIAGNOSIS — Z71.85 IMMUNIZATION COUNSELING: ICD-10-CM

## 2021-04-14 DIAGNOSIS — K21.9 GERD WITHOUT ESOPHAGITIS: ICD-10-CM

## 2021-04-14 DIAGNOSIS — E78.2 MIXED HYPERLIPIDEMIA: ICD-10-CM

## 2021-04-14 PROCEDURE — 3288F FALL RISK ASSESSMENT DOCD: CPT | Performed by: FAMILY MEDICINE

## 2021-04-14 PROCEDURE — 3079F DIAST BP 80-89 MM HG: CPT | Performed by: FAMILY MEDICINE

## 2021-04-14 PROCEDURE — 1036F TOBACCO NON-USER: CPT | Performed by: FAMILY MEDICINE

## 2021-04-14 PROCEDURE — 99214 OFFICE O/P EST MOD 30 MIN: CPT | Performed by: FAMILY MEDICINE

## 2021-04-14 PROCEDURE — 1101F PT FALLS ASSESS-DOCD LE1/YR: CPT | Performed by: FAMILY MEDICINE

## 2021-04-14 PROCEDURE — 3725F SCREEN DEPRESSION PERFORMED: CPT | Performed by: FAMILY MEDICINE

## 2021-04-14 PROCEDURE — 1160F RVW MEDS BY RX/DR IN RCRD: CPT | Performed by: FAMILY MEDICINE

## 2021-04-14 PROCEDURE — 3077F SYST BP >= 140 MM HG: CPT | Performed by: FAMILY MEDICINE

## 2021-04-14 RX ORDER — INSULIN DETEMIR 100 [IU]/ML
10 INJECTION, SOLUTION SUBCUTANEOUS DAILY
Qty: 15 ML | Refills: 3
Start: 2021-04-14 | End: 2021-08-11

## 2021-04-14 NOTE — ASSESSMENT & PLAN NOTE
Been an issue in the past but it seemed to increase and frequency when she started to Trulicity  Now it seems to be calming down  It does respond to using Tums, just one, not every day  Does not think that she wants to use daily Pepcid

## 2021-04-14 NOTE — PROGRESS NOTES
BMI Counseling: Body mass index is 25 72 kg/m²  The BMI is above normal  Nutrition recommendations include reducing portion sizes, decreasing overall calorie intake, 3-5 servings of fruits/vegetables daily, reducing fast food intake, consuming healthier snacks, decreasing soda and/or juice intake, moderation in carbohydrate intake, increasing intake of lean protein, reducing intake of saturated fat and trans fat and reducing intake of cholesterol  Exercise recommendations include moderate aerobic physical activity for 150 minutes/week, exercising 3-5 times per week and joining a gym  Assessment/Plan:    GERD without esophagitis  Been an issue in the past but it seemed to increase and frequency when she started to Trulicity  Now it seems to be calming down  It does respond to using Tums, just one, not every day  Does not think that she wants to use daily Pepcid  Type 2 diabetes mellitus with hyperglycemia (HCC)    Lab Results   Component Value Date    HGBA1C 7 5 (H) 04/09/2021   Hemoglobin A1c has improved to 6 8% with her Trulicity and decreased dosage of the Levemir  We had stopped the repaglinide  HBSM: FBS = 105-135 - no feelings of hypoglycemia  Benign essential hypertension  04/09/21: CMP=WNL except Zm=194  Home blood pressures show systolics to range between 120s and 140  Diastolic in the 72T and 23G  Blood pressure is controlled on present treatment regimen  Patient misses no doses and tolerates the meds without side effects  Patient avoids salt  Discussed diet, exercise and weight control  No change in present meds   Continue HBPM       Stage 3a chronic kidney disease  Lab Results   Component Value Date    EGFR 61 04/09/2021    EGFR 55 10/19/2020    EGFR 57 07/31/2020    CREATININE 0 88 04/09/2021    CREATININE 0 96 10/19/2020    CREATININE 0 93 07/31/2020   10/01/20: GFR = 66 and creat = 0 82   10/15/20: GFR = 58 and creat = 0 92   10/19/20: GFR = 55 and creat = 0 96   04/09/21: GFR = 61 and creat = 0 88  Mixed hyperlipidemia  Medications reconciled  Remains on simvastatin at 20 milligrams daily  Vitamin D deficiency  Remains on vitamin D3 at 2000 units once a day  Diagnoses and all orders for this visit:    Type 2 diabetes mellitus without complication, without long-term current use of insulin ([facility])  -     Comprehensive metabolic panel; Future  -     Hemoglobin A1C; Future  -     Microalbumin / creatinine urine ratio    BMI 25 0-25 9,adult    Immunization counseling  Comments: Tdap vaccine recommended    GERD without esophagitis    Type 2 diabetes mellitus with stage 3a chronic kidney disease, with long-term current use of insulin ([facility])    Type 2 diabetes mellitus with hyperglycemia, with long-term current use of insulin ([facility])  -     insulin detemir (Levemir FlexTouch) 100 Units/mL injection pen; Inject 10 Units under the skin daily    Benign essential hypertension    Mixed hyperlipidemia  -     Comprehensive metabolic panel; Future  -     Lipid Panel with Direct LDL reflex; Future    Vitamin D deficiency  -     Vitamin D 25 hydroxy; Future          Subjective:     Chief Complaint   Patient presents with    Diabetes        Patient ID: Tiana Villalta is a 82 y o  female  HPI : Multiple Chronic Medical Conditions    The following portions of the patient's history were reviewed and updated as appropriate: allergies, current medications, past family history, past medical history, past social history, past surgical history and problem list     Review of Systems   Constitutional: Negative for activity change, appetite change, fatigue, fever and unexpected weight change  Had both shots of the Covid-19 vaccine -- Moderna  HENT: Negative for congestion, dental problem and sneezing  Eyes: Negative for discharge and visual disturbance  4/12/21 - normal OPHTH exam    Respiratory: Negative for cough, chest tightness, shortness of breath and wheezing      Cardiovascular: Negative for chest pain, palpitations and leg swelling  Gastrointestinal: Negative for abdominal pain, constipation, diarrhea, nausea and vomiting  Endocrine: Negative for polydipsia and polyuria  Genitourinary: Negative for dysuria and frequency  Musculoskeletal: Negative for arthralgias  Skin: Negative for rash  Allergic/Immunologic: Negative for environmental allergies and food allergies  Neurological: Negative for weakness and headaches  Hematological: Negative for adenopathy  Psychiatric/Behavioral: Negative for behavioral problems and sleep disturbance  Objective:  Vitals:    04/14/21 1355   BP: 142/86   BP Location: Left arm   Patient Position: Sitting   Cuff Size: Standard   Pulse: 86   Resp: 18   Temp: (!) 96 6 °F (35 9 °C)   TempSrc: Tympanic   SpO2: 98%   Weight: 65 9 kg (145 lb 3 2 oz)   Height: 5' 3" (1 6 m)      Physical Exam  Vitals signs reviewed  Constitutional:       General: She is not in acute distress  Appearance: Normal appearance  She is well-developed and normal weight  She is not ill-appearing  HENT:      Head: Normocephalic  Right Ear: External ear normal       Left Ear: External ear normal       Nose: Nose normal    Eyes:      General:         Right eye: No discharge  Left eye: No discharge  Conjunctiva/sclera: Conjunctivae normal       Pupils: Pupils are equal, round, and reactive to light  Neck:      Musculoskeletal: Normal range of motion and neck supple  Thyroid: No thyromegaly  Cardiovascular:      Rate and Rhythm: Normal rate and regular rhythm  Heart sounds: Normal heart sounds  No murmur  Pulmonary:      Effort: Pulmonary effort is normal       Breath sounds: Normal breath sounds  Abdominal:      General: Bowel sounds are normal       Palpations: Abdomen is soft  Tenderness: There is no abdominal tenderness  Musculoskeletal: Normal range of motion  Right lower leg: No edema        Left lower leg: No edema    Lymphadenopathy:      Cervical: No cervical adenopathy  Skin:     General: Skin is warm  Findings: No rash  Neurological:      General: No focal deficit present  Mental Status: She is alert and oriented to person, place, and time  Psychiatric:         Mood and Affect: Mood normal          Behavior: Behavior normal          Thought Content: Thought content normal          Judgment: Judgment normal          Patient Instructions   Decrease the Levemir insulin to 8 units once a day  Continue checking home blood sugars and blood pressures  Continue the Trulicity at 4 95 milligrams, subcutaneously, once a week  Other medications will stay the same  Will recheck in 3 months with a blood test just prior to that visit  If you have any issues are unwanted side effects from the medications, you let me know

## 2021-04-14 NOTE — ASSESSMENT & PLAN NOTE
04/09/21: CMP=WNL except Na=474  Home blood pressures show systolics to range between 120s and 140  Diastolic in the 05O and 81B  Blood pressure is controlled on present treatment regimen  Patient misses no doses and tolerates the meds without side effects  Patient avoids salt  Discussed diet, exercise and weight control  No change in present meds   Continue HBPM

## 2021-04-14 NOTE — ASSESSMENT & PLAN NOTE
Lab Results   Component Value Date    HGBA1C 7 5 (H) 04/09/2021   Hemoglobin A1c has improved to 7 7% with her Trulicity and decreased dosage of the Levemir  We had stopped the repaglinide  HBSM: FBS = 105-135 - no feelings of hypoglycemia

## 2021-04-14 NOTE — ASSESSMENT & PLAN NOTE
Lab Results   Component Value Date    EGFR 61 04/09/2021    EGFR 55 10/19/2020    EGFR 57 07/31/2020    CREATININE 0 88 04/09/2021    CREATININE 0 96 10/19/2020    CREATININE 0 93 07/31/2020   10/01/20: GFR = 66 and creat = 0 82   10/15/20: GFR = 58 and creat = 0 92   10/19/20: GFR = 55 and creat = 0 96   04/09/21: GFR = 61 and creat = 0 88

## 2021-04-14 NOTE — PATIENT INSTRUCTIONS
Decrease the Levemir insulin to 8 units once a day  Continue checking home blood sugars and blood pressures  Continue the Trulicity at 2 35 milligrams, subcutaneously, once a week  Other medications will stay the same  Will recheck in 3 months with a blood test just prior to that visit  If you have any issues are unwanted side effects from the medications, you let me know

## 2021-04-26 ENCOUNTER — TELEPHONE (OUTPATIENT)
Dept: FAMILY MEDICINE CLINIC | Facility: CLINIC | Age: 86
End: 2021-04-26

## 2021-04-26 NOTE — TELEPHONE ENCOUNTER
Miralax - one capful dissolved in 6 ounces of water, orally, once a day  This is a stool softener  If she needs something on an AS NEEDED basis for sluggish bowels, she could take senokot (8 6mg) - 2 tablets, once a day, in the evening  Both of these are OTC   Once her bowels are moving OK, she could titrate the Miralax down to a daily dose that keeps her stools soft, like 1/2 capful on M-W-F

## 2021-04-26 NOTE — TELEPHONE ENCOUNTER
Patient wondering what Dr Tayler Darnell would recommended OTC for constipation  Patient has been constipated for about 4 days  Please advise

## 2021-04-30 NOTE — TELEPHONE ENCOUNTER
Patient called back for instructions for the senokot, read instructions below  She is going to try that tonight  miralax helped somewhat  She is going to try senokot and if she is still having issues she will call us back

## 2021-05-17 ENCOUNTER — OFFICE VISIT (OUTPATIENT)
Dept: CARDIOLOGY CLINIC | Facility: CLINIC | Age: 86
End: 2021-05-17
Payer: COMMERCIAL

## 2021-05-17 VITALS
DIASTOLIC BLOOD PRESSURE: 72 MMHG | HEART RATE: 84 BPM | SYSTOLIC BLOOD PRESSURE: 132 MMHG | HEIGHT: 63 IN | BODY MASS INDEX: 25.52 KG/M2 | RESPIRATION RATE: 16 BRPM | WEIGHT: 144 LBS

## 2021-05-17 DIAGNOSIS — E11.22 TYPE 2 DIABETES MELLITUS WITH STAGE 3A CHRONIC KIDNEY DISEASE, WITH LONG-TERM CURRENT USE OF INSULIN (HCC): ICD-10-CM

## 2021-05-17 DIAGNOSIS — I49.3 VENTRICULAR PREMATURE DEPOLARIZATION: ICD-10-CM

## 2021-05-17 DIAGNOSIS — R00.2 PALPITATIONS: ICD-10-CM

## 2021-05-17 DIAGNOSIS — Z79.4 TYPE 2 DIABETES MELLITUS WITH STAGE 3A CHRONIC KIDNEY DISEASE, WITH LONG-TERM CURRENT USE OF INSULIN (HCC): ICD-10-CM

## 2021-05-17 DIAGNOSIS — I10 BENIGN ESSENTIAL HYPERTENSION: ICD-10-CM

## 2021-05-17 DIAGNOSIS — E78.2 MIXED HYPERLIPIDEMIA: ICD-10-CM

## 2021-05-17 DIAGNOSIS — N18.31 TYPE 2 DIABETES MELLITUS WITH STAGE 3A CHRONIC KIDNEY DISEASE, WITH LONG-TERM CURRENT USE OF INSULIN (HCC): ICD-10-CM

## 2021-05-17 DIAGNOSIS — E88.09 HYPOALBUMINEMIA: ICD-10-CM

## 2021-05-17 DIAGNOSIS — I48.0 PAROXYSMAL ATRIAL FIBRILLATION (HCC): Primary | ICD-10-CM

## 2021-05-17 PROCEDURE — 1160F RVW MEDS BY RX/DR IN RCRD: CPT | Performed by: INTERNAL MEDICINE

## 2021-05-17 PROCEDURE — 3075F SYST BP GE 130 - 139MM HG: CPT | Performed by: INTERNAL MEDICINE

## 2021-05-17 PROCEDURE — 3078F DIAST BP <80 MM HG: CPT | Performed by: INTERNAL MEDICINE

## 2021-05-17 PROCEDURE — 1036F TOBACCO NON-USER: CPT | Performed by: INTERNAL MEDICINE

## 2021-05-17 PROCEDURE — 99214 OFFICE O/P EST MOD 30 MIN: CPT | Performed by: INTERNAL MEDICINE

## 2021-05-17 NOTE — LETTER
May 17, 2021     Katty Hines MD  [address] [facility] [address]    Patient: Yeyo Gonzales   YOB: 1935   Date of Visit: 5/17/2021       Dear Dr Heather Eldridge:    Thank you for referring Javier Harris to me for evaluation  Below are my notes for this consultation  If you have questions, please do not hesitate to call me  I look forward to following your patient along with you  Sincerely,        Tamika King MD        CC: No Recipients  Tamika King MD  5/17/2021  2:35 PM  Sign when Signing Visit                                             Cardiology Follow Up    Yeyo Gonzales  1935  [id number]  [address]  [address] [facility] [address]  [phone]    1  Paroxysmal atrial fibrillation (HCC)     2  Ventricular premature depolarization     3  Mixed hyperlipidemia     4  Hypoalbuminemia     5  Palpitations     6  Benign essential hypertension     7  Type 2 diabetes mellitus with stage 3a chronic kidney disease, with long-term current use of insulin ([facility] )         Patient was 1st seen by us for new onset atrial fibrillation with RVR postop parathyroidectomy by Dr Hetal Adkins  She is a 2nd admission for the same and at that time had a type 2 non ST elevation myocardial infarction  CT scan PE study was negative  Past medical history includes hypertension, hyperlipidemia and diabetes mellitus  05/29/2018 stress test: Negative Persantine stress test  LVEF 77%, hyperdynamic ventricle  Normal tomographic perfusion series  05/23/2018 echocardiogram: Normal left ventricular systolic function, EF 20-12%  Intermediate left ventricular diastolic function  Normal left ventricular filling pressures  Mild-to-moderate left atrial enlargement  Aortic sclerosis without stenosis  06/18/2019 lipid profile: Total cholesterol 148, triglycerides 49, HDL 55, LDL direct 79  09/30/2020 lipid panel:   Total cholesterol 148, triglycerides 59, HDL 67, LDL calculated 69, non HDL cholesterol 81  Interval History:  Patient with a history of paroxysmal atrial fibrillation, hypertension, hyperlipidemia and diabetes mellitus returns  She has no cardiac complaints  Patient denies chest discomfort or shortness of breath  Patient has no palpitations  Patient denies symptoms of dizziness, lightheadedness or near-syncope/syncope  Patient denies leg edema  Patient denies symptoms of orthopnea or paroxysmal nocturnal dyspnea  Discussion/Summary:    Return in 6 months      Patient Active Problem List   Diagnosis    Benign essential hypertension    Prolapse of anterior vaginal wall    Fibrocystic breast disease    Hypercalcemia    Incontinence    Kyphosis    Microproteinuria    Ventricular premature depolarization    Tinnitus    Paroxysmal atrial fibrillation (HCC)    Mixed hyperlipidemia    Type 2 diabetes mellitus with stage 3a chronic kidney disease, with long-term current use of insulin (HCC)    Splenic artery aneurysm (HCC)    Thyroid nodule    Vitamin D deficiency    BMI 28 0-28 9,adult    Osteopenia of multiple sites    Stage 3a chronic kidney disease ([facility] )    Dysequilibrium    Anxiety    Palpitations    Hypoalbuminemia    Adjustment disorder with anxious mood    Hubbard-Walker grade 2 cystocele    Type 2 diabetes mellitus with hyperglycemia (HCC)    GERD without esophagitis     Past Medical History:   Diagnosis Date    Anemia     IRON DEF ANEMIA DUE TO MENORRHAGIA    Diabetes mellitus ([facility] )     Hypercalcemia 10/06/2010    MILD  DECREASES HCTZ    Hypertension     Melanoma ([facility] ) 11/2011    IN SITU  MID BACK    Palpitations 05/28/2018    SINUS TACHYCARDIA  TROPONIN SPLL    Skin cancer 2017    NOSE     Social History     Socioeconomic History    Marital status: /Civil Union     Spouse name: Not on file    Number of children: Not on file    Years of education: Not on file    Highest education level: Not on file   Occupational History    Not on file   Social Needs    Financial resource strain: Not on file    Food insecurity     Worry: Not on file     Inability: Not on file    Transportation needs     Medical: Not on file     Non-medical: Not on file   Tobacco Use    Smoking status: Never Smoker    Smokeless tobacco: Never Used   Substance and Sexual Activity    Alcohol use: No    Drug use: No    Sexual activity: Never   Lifestyle    Physical activity     Days per week: Not on file     Minutes per session: Not on file    Stress: Not on file   Relationships    Social connections     Talks on phone: Not on file     Gets together: Not on file     Attends Druze service: Not on file     Active member of club or organization: Not on file     Attends meetings of clubs or organizations: Not on file     Relationship status: Not on file    Intimate partner violence     Fear of current or ex partner: Not on file     Emotionally abused: Not on file     Physically abused: Not on file     Forced sexual activity: Not on file   Other Topics Concern    Not on file   Social History Narrative    Not on file      Family History   Problem Relation Age of Onset    Other Mother         TESTED(-)    Colon cancer Father 61    Prostate cancer Father 79    Factor V Leiden deficiency Daughter     Factor V Leiden deficiency Daughter     Breast cancer Sister         in her 63's    No Known Problems Maternal Grandmother     No Known Problems Maternal Grandfather     No Known Problems Paternal Grandmother     No Known Problems Paternal Grandfather     No Known Problems Sister     No Known Problems Sister     No Known Problems Maternal Aunt     No Known Problems Maternal Aunt     No Known Problems Maternal Aunt     No Known Problems Maternal Aunt     No Known Problems Maternal Aunt     No Known Problems Maternal Aunt     No Known Problems Maternal Aunt     No Known Problems Paternal Aunt     No Known Problems Paternal Aunt     No Known Problems Paternal Aunt      Past Surgical History:   Procedure Laterality Date    GALLBLADDER SURGERY  1991    HYSTERECTOMY  1995    UTERINE PROLAPSE VAGINAL POLYPS    HYSTERECTOMY      IRON REPLACEMENT    OTHER SURGICAL HISTORY  11/2011    EXCISION MELANOMA IN SITU MID BACK    PARATHYROIDECTOMY  05/21/2018       Current Outpatient Medications:     atenolol (TENORMIN) 50 mg tablet, Take 1 tablet (50 mg total) by mouth 2 (two) times a day, Disp: 180 tablet, Rfl: 3    BD Pen Needle Glory U/F 32G X 4 MM MISC, TEST DAILY AS DIRECTED, Disp: 100 each, Rfl: 3    diltiazem (CARDIZEM CD) 120 mg 24 hr capsule, Take 1 capsule (120 mg total) by mouth daily, Disp: 90 capsule, Rfl: 3    Dulaglutide (Trulicity) 0 28 KI/1 5TT SOPN, Inject 0 5 mL (0 75 mg total) under the skin once a week, Disp: 5 pen, Rfl: 5    glucose blood (Accu-Chek SmartView) test strip, USE TO TEST BLOOD SUGAR ONCE A DAY, Disp: 100 each, Rfl: 3    hydrochlorothiazide (HYDRODIURIL) 12 5 mg tablet, Take 1 tablet (12 5 mg total) by mouth daily, Disp: 90 tablet, Rfl: 3    hydrOXYzine HCL (ATARAX) 25 mg tablet, TAKE 1 TABLET TWICE DAILY AS NEEDED FOR ANXIETY, Disp: 60 tablet, Rfl: 3    insulin detemir (Levemir FlexTouch) 100 Units/mL injection pen, Inject 10 Units under the skin daily, Disp: 15 mL, Rfl: 3    lisinopril (ZESTRIL) 20 mg tablet, Take 1 tablet (20 mg total) by mouth daily, Disp: 90 tablet, Rfl: 3    MICROLET LANCETS MISC, by Does not apply route daily, Disp: 50 each, Rfl: 3    rivaroxaban (Xarelto) 20 mg tablet, Take 1 tablet (20 mg total) by mouth every 24 hours, Disp: 90 tablet, Rfl: 3    simvastatin (ZOCOR) 20 mg tablet, Take 1 tablet (20 mg total) by mouth daily, Disp: 90 tablet, Rfl: 3    Vitamin D, Cholecalciferol, 50 MCG (2000 UT) CAPS, Take 1 capsule by mouth daily, Disp: , Rfl:   Allergies   Allergen Reactions    No Active Allergies        No results found for this visit on 05/17/21        Review of Systems:  Review of Systems   Respiratory: Negative for cough, choking, chest tightness, shortness of breath and wheezing  Cardiovascular: Negative for chest pain, palpitations and leg swelling  Musculoskeletal: Negative for gait problem  Skin: Negative for rash  Neurological: Negative for dizziness, tremors, syncope, weakness, light-headedness, numbness and headaches  Psychiatric/Behavioral: Negative for agitation and behavioral problems  The patient is not hyperactive  Physical Exam:  /72   Pulse 84   Resp 16   Ht 5' 3" (1 6 m)   Wt 65 3 kg (144 lb)   BMI 25 51 kg/m²   Physical Exam  Constitutional:       General: She is not in acute distress  Appearance: She is well-developed  HENT:      Head: Normocephalic and atraumatic  Neck:      Musculoskeletal: Normal range of motion and neck supple  Thyroid: No thyromegaly  Vascular: No carotid bruit or JVD  Trachea: No tracheal deviation  Cardiovascular:      Rate and Rhythm: Normal rate and regular rhythm  Pulses: Normal pulses  Heart sounds: Normal heart sounds  No murmur  No friction rub  No gallop  Pulmonary:      Effort: Pulmonary effort is normal  No respiratory distress  Breath sounds: Normal breath sounds  No wheezing, rhonchi or rales  Chest:      Chest wall: No tenderness  Abdominal:      General: Bowel sounds are normal  There is no distension  Palpations: Abdomen is soft  Tenderness: There is no abdominal tenderness  Musculoskeletal: Normal range of motion  Right lower leg: No edema  Left lower leg: No edema  Skin:     General: Skin is warm and dry  Neurological:      General: No focal deficit present  Mental Status: She is alert and oriented to person, place, and time  Gait: Gait normal    Psychiatric:         Mood and Affect: Mood normal          Behavior: Behavior normal          Thought Content:  Thought content normal          Judgment: Judgment normal  --------------------------------------------------------------------------------  ECHO:   Results for orders placed in visit on 18   Echo complete with contrast if indicated    Narrative [phone]  [place] [place] [address] [id number] [phone]    Cardiovascular Report  _________________________________________________________________         Transthoracic Echocardiogram Report  Jennie Sandoval    MRN:                 [id number]  Account :           [de-identified]  Accession :         [id number]  Exam Date:           2018 11:13  Patient Location:    [facility]^T306 0  Ordering Phys:       Gregory Ugarte  Attending Phys:      Ivory Houston  Technologist:        Dorinda Higuera    Age:  [de-identified]             :   1935           Gender:   F  Wt:   158 lb         Ht:    63 in  Indication:  Atrial fibrillation  BP:         /       HR:    Rhythm:              Sinus    Technical Quality:   Fair      MEASUREMENTS  2D ECHO  Body Surface Area                 1 8 m²  LV Diastolic Diameter PLAX        4 1 cm  LV Systolic Diameter PLAX         2 8 cm  IVS Diastolic Thickness           1 1 cm  LVPW Diastolic Thickness          1 0 cm  LV Relative Wall Thickness        0 5  RV Internal Dim ED PLAX           3 4 cm  Aortic Root Diameter              3 2 cm  LA Systolic Diameter LX           3 8 cm  LA Area                           21 1 cm²  LV Ejection Fraction MOD 4C       67 7 %  LA Volume AL                      68 5 cm³  LA Volume AL Index                38 0 cm³/m²  RA Area 4C View                   13 8 cm²    DOPPLER  AV Peak Velocity                  130 8 cm/s  AV Peak Gradient                  6 8 mmHg  LVOT Peak Velocity                103 7 cm/s  LVOT Peak Gradient                4 3 mmHg  MV Area PHT                       3 1 cm²  Mitral E Point Velocity           103 2 cm/s  Mitral A Point Velocity           99 7 cm/s  Mitral E to A Ratio               1 0  MV E' Velocity 8 0 cm/s  Mitral E to MV E' Ratio           12 9  TR Peak Velocity                  207 7 cm/s  TR Peak Gradient                  17 2 mmHg  TAPSE                             2 3 cm  Right Atrial Pressure             5 0 mmHg  Pulmonary Artery Systolic Pressu  02 5 mmHg  Right Ventricular Systolic Press  52 0 mmHg  PV Peak Velocity                  96 1 cm/s  PV Peak Gradient                  3 7 mmHg      FINDINGS  Left Ventricle  Normal left ventricular systolic function, EF estimated at 60-  65%  Normal left ventricular cavity size  Normal left  ventricular wall thickness  Normal left ventricular wall motion  Indeterminate left ventricular diastolic function  Normal left  ventricular filling pressures  Right Ventricle  Normal right ventricular cavity size and systolic function,  right ventricular systolic pressure, 47 5 mmHg  Normal pulmonary  arterial systolic pressure  Right Atrium  Normal right atrial size  Left Atrium  Mild-to-moderate left atrial enlargement  Mitral Valve  Trace mitral regurgitation  Aortic Valve  Aortic sclerosis without stenosis  Tricuspid Valve  Trace tricuspid regurgitation  Pulmonic Valve  No pulmonic regurgitation  Pericardium  No pericardial effusion  Aorta  Normal aortic root size  CONCLUSIONS  Normal left ventricular systolic function, EF estimated at 60-  65%  Indeterminate left ventricular diastolic function  Normal left ventricular filling pressures  Mild-to-moderate left atrial enlargement  Aortic sclerosis without stenosis      Michel Marcial MD  (Electronically Signed)  Final Date:      23 May 2018 16:28  CV Report                    Zahra Argueta       [id number]  Final                                                     Page 3       --------------------------------------------------------------------------------  Rae Rdz  Results for orders placed during the hospital encounter of 11/11/20   Holter monitor - 24 hour    Narrative PT NAME: Violetta Marion  : 1935  AGE: 82 y o  GENDER: female  MRN: [id number]   PROCEDURE: Holter monitor - 24 hour        INDICATIONS:   PALPITATIONS        Impression 1  The patient had predominantly sinus rhythm with minor 1st degree AV   block  2  Heart rate varied from 56 bpm to 86 bpm   The patients average heart   rate was 66 bpm     3  The patient had a holter monitor tracing done for 23 hours and 59   minutes  4  The patient had 12 single ventricular ectopic activity versus Lucero   Phenomenon  5  The patient had 3 single supraventricular ectopy  6  The longest R/R interval was 1 4 seconds  7  No other major arrhythmia was noted  8  No entries were made in the diary attached             ======================================================    Lab Results   Component Value Date    WBC 10 90 (H) 2019    HGB 13 6 2019    HCT 42 6 2019    MCV 93 2019     2019      Lab Results   Component Value Date    SODIUM 135 (L) 2021    K 4 0 2021     2021    CO2 31 2021    BUN 15 2021    CREATININE 0 88 2021    GLUC 185 (H) 10/19/2020    CALCIUM 10 1 2021      Lab Results   Component Value Date    HGBA1C 7 5 (H) 2021      Lab Results   Component Value Date    CHOL 128 2018     Lab Results   Component Value Date    HDL 61 2020    HDL 63 2019    HDL 55 2019     Lab Results   Component Value Date    LDLCALC 64 2020    LDLCALC 79 2019    LDLCALC 79 2019     Lab Results   Component Value Date    TRIG 46 2020    TRIG 50 2019    TRIG 49 2019         Imaging:   {Results Review Statement:26563}      Portions of the record may have been created with voice recognition software  Occasional wrong word or "sound a like" substitutions may have occurred due to the inherent limitations of voice recognition software   Read the chart carefully and recognize, using context, where substitutions have occurred

## 2021-05-17 NOTE — PROGRESS NOTES
Cardiology Follow Up    Jd Vaughan  1935  [id number]  [address] [facility] [address] [phone]    1  Paroxysmal atrial fibrillation (HCC)     2  Ventricular premature depolarization     3  Mixed hyperlipidemia     4  Hypoalbuminemia     5  Palpitations     6  Benign essential hypertension     7  Type 2 diabetes mellitus with stage 3a chronic kidney disease, with long-term current use of insulin ([address] )         Patient was 1st seen by us for new onset atrial fibrillation with RVR postop parathyroidectomy by Dr Varun Medina  She is a 2nd admission for the same and at that time had a type 2 non ST elevation myocardial infarction  CT scan PE study was negative  Past medical history includes hypertension, hyperlipidemia and diabetes mellitus  05/29/2018 stress test: Negative Persantine stress test  LVEF 77%, hyperdynamic ventricle  Normal tomographic perfusion series  05/23/2018 echocardiogram: Normal left ventricular systolic function, EF 53-64%  Intermediate left ventricular diastolic function  Normal left ventricular filling pressures  Mild-to-moderate left atrial enlargement  Aortic sclerosis without stenosis  06/18/2019 lipid profile: Total cholesterol 148, triglycerides 49, HDL 55, LDL direct 79  09/30/2020 lipid panel: Total cholesterol 148, triglycerides 59, HDL 67, LDL calculated 69, non HDL cholesterol 81  Interval History:  Patient with a history of paroxysmal atrial fibrillation, hypertension, hyperlipidemia and diabetes mellitus returns  She has no cardiac complaints  Patient denies chest discomfort or shortness of breath  Patient has no palpitations  Patient denies symptoms of dizziness, lightheadedness or near-syncope/syncope  Patient denies leg edema  Patient denies symptoms of orthopnea or paroxysmal nocturnal dyspnea  Discussion/Summary:    Return in 6 months      Patient Active Problem List   Diagnosis    Benign essential hypertension    Prolapse of anterior vaginal wall    Fibrocystic breast disease    Hypercalcemia    Incontinence    Kyphosis    Microproteinuria    Ventricular premature depolarization    Tinnitus    Paroxysmal atrial fibrillation (HCC)    Mixed hyperlipidemia    Type 2 diabetes mellitus with stage 3a chronic kidney disease, with long-term current use of insulin (HCC)    Splenic artery aneurysm (HCC)    Thyroid nodule    Vitamin D deficiency    BMI 28 0-28 9,adult    Osteopenia of multiple sites    Stage 3a chronic kidney disease ([facility] )    Dysequilibrium    Anxiety    Palpitations    Hypoalbuminemia    Adjustment disorder with anxious mood    Wilber-Walker grade 2 cystocele    Type 2 diabetes mellitus with hyperglycemia (HCC)    GERD without esophagitis     Past Medical History:   Diagnosis Date    Anemia     IRON DEF ANEMIA DUE TO MENORRHAGIA    Diabetes mellitus ([place] [address] )     Hypercalcemia 10/06/2010    MILD  DECREASES HCTZ    Hypertension     Melanoma ([place] [address] ) 11/2011    IN SITU  MID BACK    Palpitations 05/28/2018    SINUS TACHYCARDIA  TROPONIN SPLL    Skin cancer 2017    NOSE     Social History     Socioeconomic History    Marital status: /Civil Union     Spouse name: Not on file    Number of children: Not on file    Years of education: Not on file    Highest education level: Not on file   Occupational History    Not on file   Social Needs    Financial resource strain: Not on file    Food insecurity     Worry: Not on file     Inability: Not on file   Partschannel needs     Medical: Not on file     Non-medical: Not on file   Tobacco Use    Smoking status: Never Smoker    Smokeless tobacco: Never Used   Substance and Sexual Activity    Alcohol use: No    Drug use: No    Sexual activity: Never   Lifestyle    Physical activity     Days per week: Not on file     Minutes per session: Not on file    Stress: Not on file Relationships    Social connections     Talks on phone: Not on file     Gets together: Not on file     Attends [employer] service: Not on file     Active member of club or organization: Not on file     Attends meetings of clubs or organizations: Not on file     Relationship status: Not on file    Intimate partner violence     Fear of current or ex partner: Not on file     Emotionally abused: Not on file     Physically abused: Not on file     Forced sexual activity: Not on file   Other Topics Concern    Not on file   Social History Narrative    Not on file      Family History   Problem Relation Age of Onset    Other Mother         TESTED(-)    Colon cancer Father 61    Prostate cancer Father 79    Factor V Leiden deficiency Daughter     Factor V Leiden deficiency Daughter     Breast cancer Sister         in her 63's    No Known Problems Maternal Grandmother     No Known Problems Maternal Grandfather     No Known Problems Paternal Grandmother     No Known Problems Paternal Grandfather     No Known Problems Sister     No Known Problems Sister     No Known Problems Maternal Aunt     No Known Problems Maternal Aunt     No Known Problems Maternal Aunt     No Known Problems Maternal Aunt     No Known Problems Maternal Aunt     No Known Problems Maternal Aunt     No Known Problems Maternal Aunt     No Known Problems Paternal Aunt     No Known Problems Paternal Aunt     No Known Problems Paternal Aunt      Past Surgical History:   Procedure Laterality Date    GALLBLADDER SURGERY  1991    HYSTERECTOMY  1995    UTERINE PROLAPSE VAGINAL POLYPS    HYSTERECTOMY      IRON REPLACEMENT    OTHER SURGICAL HISTORY  11/2011    EXCISION MELANOMA IN SITU MID BACK    PARATHYROIDECTOMY  05/21/2018       Current Outpatient Medications:     atenolol (TENORMIN) 50 mg tablet, Take 1 tablet (50 mg total) by mouth 2 (two) times a day, Disp: 180 tablet, Rfl: 3    BD Pen Needle Glory U/F 32G X 4 MM MISC, TEST DAILY AS DIRECTED, Disp: 100 each, Rfl: 3    diltiazem (CARDIZEM CD) 120 mg 24 hr capsule, Take 1 capsule (120 mg total) by mouth daily, Disp: 90 capsule, Rfl: 3    Dulaglutide (Trulicity) 3 10 UG/7 0XK SOPN, Inject 0 5 mL (0 75 mg total) under the skin once a week, Disp: 5 pen, Rfl: 5    glucose blood (Accu-Chek SmartView) test strip, USE TO TEST BLOOD SUGAR ONCE A DAY, Disp: 100 each, Rfl: 3    hydrochlorothiazide (HYDRODIURIL) 12 5 mg tablet, Take 1 tablet (12 5 mg total) by mouth daily, Disp: 90 tablet, Rfl: 3    hydrOXYzine HCL (ATARAX) 25 mg tablet, TAKE 1 TABLET TWICE DAILY AS NEEDED FOR ANXIETY, Disp: 60 tablet, Rfl: 3    insulin detemir (Levemir FlexTouch) 100 Units/mL injection pen, Inject 10 Units under the skin daily, Disp: 15 mL, Rfl: 3    lisinopril (ZESTRIL) 20 mg tablet, Take 1 tablet (20 mg total) by mouth daily, Disp: 90 tablet, Rfl: 3    MICROLET LANCETS MISC, by Does not apply route daily, Disp: 50 each, Rfl: 3    rivaroxaban (Xarelto) 20 mg tablet, Take 1 tablet (20 mg total) by mouth every 24 hours, Disp: 90 tablet, Rfl: 3    simvastatin (ZOCOR) 20 mg tablet, Take 1 tablet (20 mg total) by mouth daily, Disp: 90 tablet, Rfl: 3    Vitamin D, Cholecalciferol, 50 MCG (2000 UT) CAPS, Take 1 capsule by mouth daily, Disp: , Rfl:   Allergies   Allergen Reactions    No Active Allergies        No results found for this visit on 05/17/21  Review of Systems:  Review of Systems   Respiratory: Negative for cough, choking, chest tightness, shortness of breath and wheezing  Cardiovascular: Negative for chest pain, palpitations and leg swelling  Musculoskeletal: Negative for gait problem  Skin: Negative for rash  Neurological: Negative for dizziness, tremors, syncope, weakness, light-headedness, numbness and headaches  Psychiatric/Behavioral: Negative for agitation and behavioral problems  The patient is not hyperactive          Physical Exam:  /72   Pulse 84   Resp 16   Ht 5' 3" (1 6 m)   Wt 65 3 kg (144 lb)   BMI 25 51 kg/m²   Physical Exam  Constitutional:       General: She is not in acute distress  Appearance: She is well-developed  HENT:      Head: Normocephalic and atraumatic  Neck:      Musculoskeletal: Normal range of motion and neck supple  Thyroid: No thyromegaly  Vascular: No carotid bruit or JVD  Trachea: No tracheal deviation  Cardiovascular:      Rate and Rhythm: Normal rate and regular rhythm  Pulses: Normal pulses  Heart sounds: Normal heart sounds  No murmur  No friction rub  No gallop  Pulmonary:      Effort: Pulmonary effort is normal  No respiratory distress  Breath sounds: Normal breath sounds  No wheezing, rhonchi or rales  Chest:      Chest wall: No tenderness  Abdominal:      General: Bowel sounds are normal  There is no distension  Palpations: Abdomen is soft  Tenderness: There is no abdominal tenderness  Musculoskeletal: Normal range of motion  Right lower leg: No edema  Left lower leg: No edema  Skin:     General: Skin is warm and dry  Neurological:      General: No focal deficit present  Mental Status: She is alert and oriented to person, place, and time  Gait: Gait normal    Psychiatric:         Mood and Affect: Mood normal          Behavior: Behavior normal          Thought Content:  Thought content normal          Judgment: Judgment normal             --------------------------------------------------------------------------------  ECHO:   Results for orders placed in visit on 05/23/18   Echo complete with contrast if indicated    Narrative [id number]  Leona Ortiz [address] [place] [address], [id number] [phone]    Cardiovascular Report  _________________________________________________________________         Transthoracic Echocardiogram Report  Abraham Riley    MRN:                 [id number]  Account :           [de-identified]  Accession : [id number]  Exam Date:           2018 11:13  Patient Location:    [id number]  Ordering Phys:       Nighat Maxwell  Attending Phys:      Emili Vo  Technologist:        Sergo Hoyt    Age:  [de-identified]             :   1935           Gender:   F  Wt:   158 lb         Ht:    63 in  Indication:  Atrial fibrillation  BP:         /       HR:    Rhythm:              Sinus  Technical Quality:   Fair      MEASUREMENTS  2D ECHO  Body Surface Area                 1 8 m²  LV Diastolic Diameter PLAX        4 1 cm  LV Systolic Diameter PLAX         2 8 cm  IVS Diastolic Thickness           1 1 cm  LVPW Diastolic Thickness          1 0 cm  LV Relative Wall Thickness        0 5  RV Internal Dim ED PLAX           3 4 cm  Aortic Root Diameter              3 2 cm  LA Systolic Diameter LX           3 8 cm  LA Area                           21 1 cm²  LV Ejection Fraction MOD 4C       67 7 %  LA Volume AL                      68 5 cm³  LA Volume AL Index                38 0 cm³/m²  RA Area 4C View                   13 8 cm²    DOPPLER  AV Peak Velocity                  130 8 cm/s  AV Peak Gradient                  6 8 mmHg  LVOT Peak Velocity                103 7 cm/s  LVOT Peak Gradient                4 3 mmHg  MV Area PHT                       3 1 cm²  Mitral E Point Velocity           103 2 cm/s  Mitral A Point Velocity           99 7 cm/s  Mitral E to A Ratio               1 0  MV E' Velocity                    8 0 cm/s  Mitral E to MV E' Ratio           12 9  TR Peak Velocity                  207 7 cm/s  TR Peak Gradient                  17 2 mmHg  TAPSE                             2 3 cm  Right Atrial Pressure             5 0 mmHg  Pulmonary Artery Systolic Pressu  41 9 mmHg  Right Ventricular Systolic Press  61 8 mmHg  PV Peak Velocity                  96 1 cm/s  PV Peak Gradient                  3 7 mmHg      FINDINGS  Left Ventricle  Normal left ventricular systolic function, EF estimated at 60-  65%   Normal left ventricular cavity size  Normal left  ventricular wall thickness  Normal left ventricular wall motion  Indeterminate left ventricular diastolic function  Normal left  ventricular filling pressures  Right Ventricle  Normal right ventricular cavity size and systolic function,  right ventricular systolic pressure, 47 9 mmHg  Normal pulmonary  arterial systolic pressure  Right Atrium  Normal right atrial size  Left Atrium  Mild-to-moderate left atrial enlargement  Mitral Valve  Trace mitral regurgitation  Aortic Valve  Aortic sclerosis without stenosis  Tricuspid Valve  Trace tricuspid regurgitation  Pulmonic Valve  No pulmonic regurgitation  Pericardium  No pericardial effusion  Aorta  Normal aortic root size  CONCLUSIONS  Normal left ventricular systolic function, EF estimated at 60-  65%  Indeterminate left ventricular diastolic function  Normal left ventricular filling pressures  Mild-to-moderate left atrial enlargement  Aortic sclerosis without stenosis  Anderson Negrete MD  (Electronically Signed)  Final Date:      23 May 2018 16:28  CV Report                    Michael Urban       [id number]  Final                                                     Page 3       --------------------------------------------------------------------------------  Brice Payne  Results for orders placed during the hospital encounter of 20   Holter monitor - 24 hour    Narrative PT NAME: José Miguel Ward  : 1935  AGE: 82 y o  GENDER: female  MRN: [id number]   PROCEDURE: Holter monitor - 24 hour        INDICATIONS:   PALPITATIONS        Impression 1  The patient had predominantly sinus rhythm with minor 1st degree AV   block  2  Heart rate varied from 56 bpm to 86 bpm   The patients average heart   rate was 66 bpm     3  The patient had a holter monitor tracing done for 23 hours and 59   minutes  4  The patient had 12 single ventricular ectopic activity versus Lucero   Phenomenon    5  The patient had 3 single supraventricular ectopy  6  The longest R/R interval was 1 4 seconds  7  No other major arrhythmia was noted  8  No entries were made in the diary attached             ======================================================    Lab Results   Component Value Date    WBC 10 90 (H) 01/16/2019    HGB 13 6 01/16/2019    HCT 42 6 01/16/2019    MCV 93 01/16/2019     01/16/2019      Lab Results   Component Value Date    SODIUM 135 (L) 04/09/2021    K 4 0 04/09/2021     04/09/2021    CO2 31 04/09/2021    BUN 15 04/09/2021    CREATININE 0 88 04/09/2021    GLUC 185 (H) 10/19/2020    CALCIUM 10 1 04/09/2021      Lab Results   Component Value Date    HGBA1C 7 5 (H) 04/09/2021      Lab Results   Component Value Date    CHOL 128 05/24/2018     Lab Results   Component Value Date    HDL 61 07/31/2020    HDL 63 12/30/2019    HDL 55 06/18/2019     Lab Results   Component Value Date    LDLCALC 64 07/31/2020    LDLCALC 79 12/30/2019    LDLCALC 79 06/18/2019     Lab Results   Component Value Date    TRIG 46 07/31/2020    TRIG 50 12/30/2019    TRIG 49 06/18/2019         Imaging:   I have personally reviewed pertinent reports  Portions of the record may have been created with voice recognition software  Occasional wrong word or "sound a like" substitutions may have occurred due to the inherent limitations of voice recognition software  Read the chart carefully and recognize, using context, where substitutions have occurred

## 2021-05-17 NOTE — LETTER
May 17, 2021     Andrea Crane MD  [address] [id number]    Patient: Benita Villegas   YOB: 1935   Date of Visit: 5/17/2021       Dear Dr Rasheeda Harrington:    Thank you for referring Yennifer Marin to me for evaluation  Below are my notes for this consultation  If you have questions, please do not hesitate to call me  I look forward to following your patient along with you  Sincerely,        Sonali Herrera MD        CC: No Recipients  Sonali Herrera MD  5/17/2021  2:36 PM  Sign when Signing Visit                                             Cardiology Follow Up    Benita Villegas  1935  [id number]  [address]  [address] [facility] [address] [phone]    1  Paroxysmal atrial fibrillation (HCC)     2  Ventricular premature depolarization     3  Mixed hyperlipidemia     4  Hypoalbuminemia     5  Palpitations     6  Benign essential hypertension     7  Type 2 diabetes mellitus with stage 3a chronic kidney disease, with long-term current use of insulin ([facility] [address] )         Patient was 1st seen by us for new onset atrial fibrillation with RVR postop parathyroidectomy by Dr Padmini Ramirez  She is a 2nd admission for the same and at that time had a type 2 non ST elevation myocardial infarction  CT scan PE study was negative  Past medical history includes hypertension, hyperlipidemia and diabetes mellitus  05/29/2018 stress test: Negative Persantine stress test  LVEF 77%, hyperdynamic ventricle  Normal tomographic perfusion series  05/23/2018 echocardiogram: Normal left ventricular systolic function, EF 54-30%  Intermediate left ventricular diastolic function  Normal left ventricular filling pressures  Mild-to-moderate left atrial enlargement  Aortic sclerosis without stenosis  06/18/2019 lipid profile: Total cholesterol 148, triglycerides 49, HDL 55, LDL direct 79  09/30/2020 lipid panel:   Total cholesterol 148, triglycerides 59, HDL 67, LDL calculated 69, non HDL cholesterol 81  Interval History:  Patient with a history of paroxysmal atrial fibrillation, hypertension, hyperlipidemia and diabetes mellitus returns  She has no cardiac complaints  Patient denies chest discomfort or shortness of breath  Patient has no palpitations  Patient denies symptoms of dizziness, lightheadedness or near-syncope/syncope  Patient denies leg edema  Patient denies symptoms of orthopnea or paroxysmal nocturnal dyspnea  Discussion/Summary:    Return in 6 months      Patient Active Problem List   Diagnosis    Benign essential hypertension    Prolapse of anterior vaginal wall    Fibrocystic breast disease    Hypercalcemia    Incontinence    Kyphosis    Microproteinuria    Ventricular premature depolarization    Tinnitus    Paroxysmal atrial fibrillation (HCC)    Mixed hyperlipidemia    Type 2 diabetes mellitus with stage 3a chronic kidney disease, with long-term current use of insulin (HCC)    Splenic artery aneurysm (HCC)    Thyroid nodule    Vitamin D deficiency    BMI 28 0-28 9,adult    Osteopenia of multiple sites    Stage 3a chronic kidney disease ([facility] [address] )    Dysequilibrium    Anxiety    Palpitations    Hypoalbuminemia    Adjustment disorder with anxious mood    Bethel-Walker grade 2 cystocele    Type 2 diabetes mellitus with hyperglycemia (HCC)    GERD without esophagitis     Past Medical History:   Diagnosis Date    Anemia     IRON DEF ANEMIA DUE TO MENORRHAGIA    Diabetes mellitus ([facility] [address] )     Hypercalcemia 10/06/2010    MILD  DECREASES HCTZ    Hypertension     Melanoma ([facility] [address] ) 11/2011    IN SITU  MID BACK    Palpitations 05/28/2018    SINUS TACHYCARDIA  TROPONIN SPLL    Skin cancer 2017    NOSE     Social History     Socioeconomic History    Marital status: /Civil Union     Spouse name: Not on file    Number of children: Not on file    Years of education: Not on file    Highest education level: Not on file   Occupational History    Not on file   Social Needs    Financial resource strain: Not on file    Food insecurity     Worry: Not on file     Inability: Not on file    Transportation needs     Medical: Not on file     Non-medical: Not on file   Tobacco Use    Smoking status: Never Smoker    Smokeless tobacco: Never Used   Substance and Sexual Activity    Alcohol use: No    Drug use: No    Sexual activity: Never   Lifestyle    Physical activity     Days per week: Not on file     Minutes per session: Not on file    Stress: Not on file   Relationships    Social connections     Talks on phone: Not on file     Gets together: Not on file     Attends Synagogue service: Not on file     Active member of club or organization: Not on file     Attends meetings of clubs or organizations: Not on file     Relationship status: Not on file    Intimate partner violence     Fear of current or ex partner: Not on file     Emotionally abused: Not on file     Physically abused: Not on file     Forced sexual activity: Not on file   Other Topics Concern    Not on file   Social History Narrative    Not on file      Family History   Problem Relation Age of Onset    Other Mother         TESTED(-)    Colon cancer Father 61    Prostate cancer Father 79    Factor V Leiden deficiency Daughter     Factor V Leiden deficiency Daughter     Breast cancer Sister         in her 63's    No Known Problems Maternal Grandmother     No Known Problems Maternal Grandfather     No Known Problems Paternal Grandmother     No Known Problems Paternal Grandfather     No Known Problems Sister     No Known Problems Sister     No Known Problems Maternal Aunt     No Known Problems Maternal Aunt     No Known Problems Maternal Aunt     No Known Problems Maternal Aunt     No Known Problems Maternal Aunt     No Known Problems Maternal Aunt     No Known Problems Maternal Aunt     No Known Problems Paternal Aunt     No Known Problems Paternal Aunt     No Known Problems Paternal Aunt      Past Surgical History:   Procedure Laterality Date    GALLBLADDER SURGERY  1991    HYSTERECTOMY  1995    UTERINE PROLAPSE VAGINAL POLYPS    HYSTERECTOMY      IRON REPLACEMENT    OTHER SURGICAL HISTORY  11/2011    EXCISION MELANOMA IN SITU MID BACK    PARATHYROIDECTOMY  05/21/2018       Current Outpatient Medications:     atenolol (TENORMIN) 50 mg tablet, Take 1 tablet (50 mg total) by mouth 2 (two) times a day, Disp: 180 tablet, Rfl: 3    BD Pen Needle Glory U/F 32G X 4 MM MISC, TEST DAILY AS DIRECTED, Disp: 100 each, Rfl: 3    diltiazem (CARDIZEM CD) 120 mg 24 hr capsule, Take 1 capsule (120 mg total) by mouth daily, Disp: 90 capsule, Rfl: 3    Dulaglutide (Trulicity) 0 66 IW/8 3VM SOPN, Inject 0 5 mL (0 75 mg total) under the skin once a week, Disp: 5 pen, Rfl: 5    glucose blood (Accu-Chek SmartView) test strip, USE TO TEST BLOOD SUGAR ONCE A DAY, Disp: 100 each, Rfl: 3    hydrochlorothiazide (HYDRODIURIL) 12 5 mg tablet, Take 1 tablet (12 5 mg total) by mouth daily, Disp: 90 tablet, Rfl: 3    hydrOXYzine HCL (ATARAX) 25 mg tablet, TAKE 1 TABLET TWICE DAILY AS NEEDED FOR ANXIETY, Disp: 60 tablet, Rfl: 3    insulin detemir (Levemir FlexTouch) 100 Units/mL injection pen, Inject 10 Units under the skin daily, Disp: 15 mL, Rfl: 3    lisinopril (ZESTRIL) 20 mg tablet, Take 1 tablet (20 mg total) by mouth daily, Disp: 90 tablet, Rfl: 3    MICROLET LANCETS MISC, by Does not apply route daily, Disp: 50 each, Rfl: 3    rivaroxaban (Xarelto) 20 mg tablet, Take 1 tablet (20 mg total) by mouth every 24 hours, Disp: 90 tablet, Rfl: 3    simvastatin (ZOCOR) 20 mg tablet, Take 1 tablet (20 mg total) by mouth daily, Disp: 90 tablet, Rfl: 3    Vitamin D, Cholecalciferol, 50 MCG (2000 UT) CAPS, Take 1 capsule by mouth daily, Disp: , Rfl:   Allergies   Allergen Reactions    No Active Allergies        No results found for this visit on 05/17/21        Review of Systems:  Review of Systems   Respiratory: Negative for cough, choking, chest tightness, shortness of breath and wheezing  Cardiovascular: Negative for chest pain, palpitations and leg swelling  Musculoskeletal: Negative for gait problem  Skin: Negative for rash  Neurological: Negative for dizziness, tremors, syncope, weakness, light-headedness, numbness and headaches  Psychiatric/Behavioral: Negative for agitation and behavioral problems  The patient is not hyperactive  Physical Exam:  /72   Pulse 84   Resp 16   Ht 5' 3" (1 6 m)   Wt 65 3 kg (144 lb)   BMI 25 51 kg/m²   Physical Exam  Constitutional:       General: She is not in acute distress  Appearance: She is well-developed  HENT:      Head: Normocephalic and atraumatic  Neck:      Musculoskeletal: Normal range of motion and neck supple  Thyroid: No thyromegaly  Vascular: No carotid bruit or JVD  Trachea: No tracheal deviation  Cardiovascular:      Rate and Rhythm: Normal rate and regular rhythm  Pulses: Normal pulses  Heart sounds: Normal heart sounds  No murmur  No friction rub  No gallop  Pulmonary:      Effort: Pulmonary effort is normal  No respiratory distress  Breath sounds: Normal breath sounds  No wheezing, rhonchi or rales  Chest:      Chest wall: No tenderness  Abdominal:      General: Bowel sounds are normal  There is no distension  Palpations: Abdomen is soft  Tenderness: There is no abdominal tenderness  Musculoskeletal: Normal range of motion  Right lower leg: No edema  Left lower leg: No edema  Skin:     General: Skin is warm and dry  Neurological:      General: No focal deficit present  Mental Status: She is alert and oriented to person, place, and time  Gait: Gait normal    Psychiatric:         Mood and Affect: Mood normal          Behavior: Behavior normal          Thought Content:  Thought content normal          Judgment: Judgment normal  --------------------------------------------------------------------------------  ECHO:   Results for orders placed in visit on 18   Echo complete with contrast if indicated    Narrative [phone] , [id number] [phone]    Cardiovascular Report  _________________________________________________________________         Transthoracic Echocardiogram Report  Marisol Walsh    MRN:                 [id number]  Account :           [de-identified]  Accession :         [id number]  Exam Date:           2018 11:13  Patient Location:    [facility]  Ordering Phys:       Diego Blankenship  Attending Phys:      Ace Diez  Technologist:        Antoine Corral    Age:  [de-identified]             :   1935           Gender:   F  Wt:   158 lb         Ht:    63 in  Indication:  Atrial fibrillation  BP:         /       HR:    Rhythm:              Sinus    Technical Quality:   Fair      MEASUREMENTS  2D ECHO  Body Surface Area                 1 8 m²  LV Diastolic Diameter PLAX        4 1 cm  LV Systolic Diameter PLAX         2 8 cm  IVS Diastolic Thickness           1 1 cm  LVPW Diastolic Thickness          1 0 cm  LV Relative Wall Thickness        0 5  RV Internal Dim ED PLAX           3 4 cm  Aortic Root Diameter              3 2 cm  LA Systolic Diameter LX           3 8 cm  LA Area                           21 1 cm²  LV Ejection Fraction MOD 4C       67 7 %  LA Volume AL                      68 5 cm³  LA Volume AL Index                38 0 cm³/m²  RA Area 4C View                   13 8 cm²    DOPPLER  AV Peak Velocity                  130 8 cm/s  AV Peak Gradient                  6 8 mmHg  LVOT Peak Velocity                103 7 cm/s  LVOT Peak Gradient                4 3 mmHg  MV Area PHT                       3 1 cm²  Mitral E Point Velocity           103 2 cm/s  Mitral A Point Velocity           99 7 cm/s  Mitral E to A Ratio               1 0  MV E' Velocity 8 0 cm/s  Mitral E to MV E' Ratio           12 9  TR Peak Velocity                  207 7 cm/s  TR Peak Gradient                  17 2 mmHg  TAPSE                             2 3 cm  Right Atrial Pressure             5 0 mmHg  Pulmonary Artery Systolic Pressu  81 3 mmHg  Right Ventricular Systolic Press  48 1 mmHg  PV Peak Velocity                  96 1 cm/s  PV Peak Gradient                  3 7 mmHg      FINDINGS  Left Ventricle  Normal left ventricular systolic function, EF estimated at 60-  65%  Normal left ventricular cavity size  Normal left  ventricular wall thickness  Normal left ventricular wall motion  Indeterminate left ventricular diastolic function  Normal left  ventricular filling pressures  Right Ventricle  Normal right ventricular cavity size and systolic function,  right ventricular systolic pressure, 56 5 mmHg  Normal pulmonary  arterial systolic pressure  Right Atrium  Normal right atrial size  Left Atrium  Mild-to-moderate left atrial enlargement  Mitral Valve  Trace mitral regurgitation  Aortic Valve  Aortic sclerosis without stenosis  Tricuspid Valve  Trace tricuspid regurgitation  Pulmonic Valve  No pulmonic regurgitation  Pericardium  No pericardial effusion  Aorta  Normal aortic root size  CONCLUSIONS  Normal left ventricular systolic function, EF estimated at 60-  65%  Indeterminate left ventricular diastolic function  Normal left ventricular filling pressures  Mild-to-moderate left atrial enlargement  Aortic sclerosis without stenosis      Shaune Frankel MD  (Electronically Signed)  Final Date:      23 May 2018 16:28  CV Report                    Kobe Pham       [id number]  Final                                                     Page 3       --------------------------------------------------------------------------------  Terry Reason  Results for orders placed during the hospital encounter of 11/11/20   Holter monitor - 24 hour    Narrative PT NAME: Nat Elliott  : 1935  AGE: 82 y o  GENDER: female  MRN: [id number]   PROCEDURE: Holter monitor - 24 hour        INDICATIONS:   PALPITATIONS        Impression 1  The patient had predominantly sinus rhythm with minor 1st degree AV   block  2  Heart rate varied from 56 bpm to 86 bpm   The patients average heart   rate was 66 bpm     3  The patient had a holter monitor tracing done for 23 hours and 59   minutes  4  The patient had 12 single ventricular ectopic activity versus Lucero   Phenomenon  5  The patient had 3 single supraventricular ectopy  6  The longest R/R interval was 1 4 seconds  7  No other major arrhythmia was noted  8  No entries were made in the diary attached             ======================================================    Lab Results   Component Value Date    WBC 10 90 (H) 2019    HGB 13 6 2019    HCT 42 6 2019    MCV 93 2019     2019      Lab Results   Component Value Date    SODIUM 135 (L) 2021    K 4 0 2021     2021    CO2 31 2021    BUN 15 2021    CREATININE 0 88 2021    GLUC 185 (H) 10/19/2020    CALCIUM 10 1 2021      Lab Results   Component Value Date    HGBA1C 7 5 (H) 2021      Lab Results   Component Value Date    CHOL 128 2018     Lab Results   Component Value Date    HDL 61 2020    HDL 63 2019    HDL 55 2019     Lab Results   Component Value Date    LDLCALC 64 2020    LDLCALC 79 2019    LDLCALC 79 2019     Lab Results   Component Value Date    TRIG 46 2020    TRIG 50 2019    TRIG 49 2019         Imaging:   I have personally reviewed pertinent reports  Portions of the record may have been created with voice recognition software  Occasional wrong word or "sound a like" substitutions may have occurred due to the inherent limitations of voice recognition software   Read the chart carefully and recognize, using context, where substitutions have occurred

## 2021-06-07 NOTE — ASSESSMENT & PLAN NOTE
HBPM: SBP = 130s and 140s  The DBP = 70s  Avoids salt  Blood pressure is controlled on present treatment regimen  Patient misses no doses and tolerates the meds without side effects  Patient avoids salt  Discussed diet, exercise and weight control  No change in present meds   Continue HBPM  IMMEDIATE OPERATIVE NOTE    Patient: Jennifer Torres 88 year old female     Surgeon(s): Rhea Avalos MD  Phone Number: [phone]                       Surgeon(s) and Role:     * Rhea Avalos MD - Primary     * Antonio Sumner MD - Assisting     Pre-Op Diagnosis: Colovaginal fistula     Post-Op Diagnosis: Sigmoid diverticulitis; colovaginal fistula; umbilical hernia    Procedure: Low anterior resection; takedown and repair of colovaginal fistula; umbilical hernia repair    Anesthesia Type: General                                   Specimens Removed: Sigmoid colon, partial rectum     Estimated Blood Loss: 200 mL     Complications: none     Implants: none

## 2021-07-06 ENCOUNTER — TELEPHONE (OUTPATIENT)
Dept: FAMILY MEDICINE CLINIC | Facility: CLINIC | Age: 86
End: 2021-07-06

## 2021-07-06 DIAGNOSIS — E78.49 OTHER HYPERLIPIDEMIA: ICD-10-CM

## 2021-07-06 RX ORDER — SIMVASTATIN 20 MG
20 TABLET ORAL DAILY
Qty: 90 TABLET | Refills: 0 | Status: SHIPPED | OUTPATIENT
Start: 2021-07-06 | End: 2021-09-07 | Stop reason: SDUPTHER

## 2021-07-06 NOTE — TELEPHONE ENCOUNTER
[facility] wants you to be aware of increased muscle pain interaction between diltiazem and simvastatin

## 2021-07-21 ENCOUNTER — RA CDI HCC (OUTPATIENT)
Dept: OTHER | Facility: HOSPITAL | Age: 86
End: 2021-07-21

## 2021-07-21 NOTE — PROGRESS NOTES
[facility]  coding opportunities          Chart reviewed, no opportunity found: CHART REVIEWED, NO OPPORTUNITY FOUND                     Patients insurance company: [employer] (Medicare Advantage and Commercial)

## 2021-07-27 ENCOUNTER — TELEPHONE (OUTPATIENT)
Dept: FAMILY MEDICINE CLINIC | Facility: CLINIC | Age: 86
End: 2021-07-27

## 2021-07-28 ENCOUNTER — CLINICAL SUPPORT (OUTPATIENT)
Dept: FAMILY MEDICINE CLINIC | Facility: CLINIC | Age: 86
End: 2021-07-28
Payer: COMMERCIAL

## 2021-07-28 DIAGNOSIS — N18.1 TYPE 2 DIABETES MELLITUS WITH STAGE 1 CHRONIC KIDNEY DISEASE, WITH LONG-TERM CURRENT USE OF INSULIN (HCC): ICD-10-CM

## 2021-07-28 DIAGNOSIS — Z79.4 TYPE 2 DIABETES MELLITUS WITH STAGE 1 CHRONIC KIDNEY DISEASE, WITH LONG-TERM CURRENT USE OF INSULIN (HCC): ICD-10-CM

## 2021-07-28 DIAGNOSIS — E55.9 VITAMIN D DEFICIENCY: ICD-10-CM

## 2021-07-28 DIAGNOSIS — E11.9 TYPE 2 DIABETES MELLITUS WITHOUT COMPLICATION, WITHOUT LONG-TERM CURRENT USE OF INSULIN (HCC): ICD-10-CM

## 2021-07-28 DIAGNOSIS — E78.2 MIXED HYPERLIPIDEMIA: ICD-10-CM

## 2021-07-28 DIAGNOSIS — E11.22 TYPE 2 DIABETES MELLITUS WITH STAGE 1 CHRONIC KIDNEY DISEASE, WITH LONG-TERM CURRENT USE OF INSULIN (HCC): ICD-10-CM

## 2021-07-28 LAB
25(OH)D3 SERPL-MCNC: 53.2 NG/ML (ref 30–100)
ALBUMIN SERPL BCP-MCNC: 3.2 G/DL (ref 3.5–5)
ALP SERPL-CCNC: 95 U/L (ref 46–116)
ALT SERPL W P-5'-P-CCNC: 30 U/L (ref 12–78)
ANION GAP SERPL CALCULATED.3IONS-SCNC: 2 MMOL/L (ref 4–13)
AST SERPL W P-5'-P-CCNC: 21 U/L (ref 5–45)
BILIRUB SERPL-MCNC: 0.52 MG/DL (ref 0.2–1)
BUN SERPL-MCNC: 19 MG/DL (ref 5–25)
CALCIUM ALBUM COR SERPL-MCNC: 10.8 MG/DL (ref 8.3–10.1)
CALCIUM SERPL-MCNC: 10.2 MG/DL (ref 8.3–10.1)
CHLORIDE SERPL-SCNC: 103 MMOL/L (ref 100–108)
CHOLEST SERPL-MCNC: 143 MG/DL (ref 50–200)
CO2 SERPL-SCNC: 30 MMOL/L (ref 21–32)
CREAT SERPL-MCNC: 0.78 MG/DL (ref 0.6–1.3)
EST. AVERAGE GLUCOSE BLD GHB EST-MCNC: 151 MG/DL
GFR SERPL CREATININE-BSD FRML MDRD: 70 ML/MIN/1.73SQ M
GLUCOSE P FAST SERPL-MCNC: 103 MG/DL (ref 65–99)
HBA1C MFR BLD: 6.9 %
HDLC SERPL-MCNC: 58 MG/DL
LDLC SERPL CALC-MCNC: 74 MG/DL (ref 0–100)
POTASSIUM SERPL-SCNC: 3.9 MMOL/L (ref 3.5–5.3)
PROT SERPL-MCNC: 7.7 G/DL (ref 6.4–8.2)
SODIUM SERPL-SCNC: 135 MMOL/L (ref 136–145)
TRIGL SERPL-MCNC: 53 MG/DL

## 2021-07-28 PROCEDURE — 80053 COMPREHEN METABOLIC PANEL: CPT | Performed by: FAMILY MEDICINE

## 2021-07-28 PROCEDURE — 36415 COLL VENOUS BLD VENIPUNCTURE: CPT | Performed by: FAMILY MEDICINE

## 2021-07-28 PROCEDURE — 80061 LIPID PANEL: CPT | Performed by: FAMILY MEDICINE

## 2021-07-28 PROCEDURE — 83036 HEMOGLOBIN GLYCOSYLATED A1C: CPT | Performed by: FAMILY MEDICINE

## 2021-07-28 PROCEDURE — 82306 VITAMIN D 25 HYDROXY: CPT | Performed by: FAMILY MEDICINE

## 2021-07-31 DIAGNOSIS — I48.91 ATRIAL FIBRILLATION, UNSPECIFIED TYPE (HCC): ICD-10-CM

## 2021-07-31 RX ORDER — RIVAROXABAN 20 MG/1
TABLET, FILM COATED ORAL
Qty: 90 TABLET | Refills: 3 | Status: SHIPPED | OUTPATIENT
Start: 2021-07-31 | End: 2022-06-22 | Stop reason: SDUPTHER

## 2021-08-01 RX ORDER — DULAGLUTIDE 0.75 MG/.5ML
0.75 INJECTION, SOLUTION SUBCUTANEOUS WEEKLY
Qty: 5 ML | Refills: 5 | Status: SHIPPED | OUTPATIENT
Start: 2021-08-01 | End: 2021-10-20 | Stop reason: SDUPTHER

## 2021-08-11 DIAGNOSIS — E11.65 TYPE 2 DIABETES MELLITUS WITH HYPERGLYCEMIA, WITH LONG-TERM CURRENT USE OF INSULIN (HCC): ICD-10-CM

## 2021-08-11 DIAGNOSIS — Z79.4 TYPE 2 DIABETES MELLITUS WITH HYPERGLYCEMIA, WITH LONG-TERM CURRENT USE OF INSULIN (HCC): ICD-10-CM

## 2021-08-11 RX ORDER — INSULIN DETEMIR 100 [IU]/ML
INJECTION, SOLUTION SUBCUTANEOUS
Qty: 15 ML | Refills: 3 | Status: SHIPPED | OUTPATIENT
Start: 2021-08-11 | End: 2021-12-09

## 2021-08-12 DIAGNOSIS — I10 BENIGN ESSENTIAL HYPERTENSION: ICD-10-CM

## 2021-08-12 RX ORDER — ATENOLOL 50 MG/1
50 TABLET ORAL 2 TIMES DAILY
Qty: 180 TABLET | Refills: 3 | Status: SHIPPED | OUTPATIENT
Start: 2021-08-12 | End: 2022-06-22 | Stop reason: SDUPTHER

## 2021-08-30 ENCOUNTER — RA CDI HCC (OUTPATIENT)
Dept: OTHER | Facility: HOSPITAL | Age: 86
End: 2021-08-30

## 2021-08-30 NOTE — PROGRESS NOTES
[facility]  coding opportunities       Chart reviewed, no opportunity found: CHART REVIEWED, NO OPPORTUNITY FOUND                        Patients insurance company: [employer] (Medicare Advantage and Commercial)

## 2021-09-01 DIAGNOSIS — E11.9 TYPE 2 DIABETES MELLITUS WITHOUT COMPLICATION, UNSPECIFIED WHETHER LONG TERM INSULIN USE (HCC): ICD-10-CM

## 2021-09-01 RX ORDER — BLOOD-GLUCOSE METER
EACH MISCELLANEOUS DAILY
Qty: 1 KIT | Refills: 0 | Status: SHIPPED | OUTPATIENT
Start: 2021-09-01 | End: 2021-09-30

## 2021-09-01 RX ORDER — BLOOD SUGAR DIAGNOSTIC
1 STRIP MISCELLANEOUS DAILY
Qty: 100 STRIP | Refills: 3 | Status: SHIPPED | OUTPATIENT
Start: 2021-09-01

## 2021-09-01 RX ORDER — LANCETS
EACH MISCELLANEOUS DAILY
Qty: 100 EACH | Refills: 3 | Status: SHIPPED | OUTPATIENT
Start: 2021-09-01

## 2021-09-03 DIAGNOSIS — I48.0 PAROXYSMAL ATRIAL FIBRILLATION (HCC): ICD-10-CM

## 2021-09-03 RX ORDER — DILTIAZEM HYDROCHLORIDE 120 MG/1
CAPSULE, COATED, EXTENDED RELEASE ORAL
Qty: 90 CAPSULE | Refills: 3 | Status: SHIPPED | OUTPATIENT
Start: 2021-09-03 | End: 2022-06-22 | Stop reason: SDUPTHER

## 2021-09-07 ENCOUNTER — OFFICE VISIT (OUTPATIENT)
Dept: FAMILY MEDICINE CLINIC | Facility: CLINIC | Age: 86
End: 2021-09-07
Payer: COMMERCIAL

## 2021-09-07 VITALS
TEMPERATURE: 98.4 F | OXYGEN SATURATION: 96 % | RESPIRATION RATE: 18 BRPM | SYSTOLIC BLOOD PRESSURE: 160 MMHG | DIASTOLIC BLOOD PRESSURE: 90 MMHG | HEART RATE: 83 BPM | WEIGHT: 146.6 LBS | HEIGHT: 63 IN | BODY MASS INDEX: 25.98 KG/M2

## 2021-09-07 DIAGNOSIS — R39.15 URINARY URGENCY: ICD-10-CM

## 2021-09-07 DIAGNOSIS — E11.22 TYPE 2 DIABETES MELLITUS WITH STAGE 3A CHRONIC KIDNEY DISEASE, WITH LONG-TERM CURRENT USE OF INSULIN (HCC): ICD-10-CM

## 2021-09-07 DIAGNOSIS — I10 BENIGN ESSENTIAL HYPERTENSION: ICD-10-CM

## 2021-09-07 DIAGNOSIS — E78.2 MIXED HYPERLIPIDEMIA: ICD-10-CM

## 2021-09-07 DIAGNOSIS — M85.89 OSTEOPENIA OF MULTIPLE SITES: ICD-10-CM

## 2021-09-07 DIAGNOSIS — Z79.4 TYPE 2 DIABETES MELLITUS WITH STAGE 3A CHRONIC KIDNEY DISEASE, WITH LONG-TERM CURRENT USE OF INSULIN (HCC): ICD-10-CM

## 2021-09-07 DIAGNOSIS — K21.9 GERD WITHOUT ESOPHAGITIS: Primary | ICD-10-CM

## 2021-09-07 DIAGNOSIS — E83.52 HYPERCALCEMIA: ICD-10-CM

## 2021-09-07 DIAGNOSIS — N18.31 STAGE 3A CHRONIC KIDNEY DISEASE (HCC): ICD-10-CM

## 2021-09-07 DIAGNOSIS — N18.31 TYPE 2 DIABETES MELLITUS WITH STAGE 3A CHRONIC KIDNEY DISEASE, WITH LONG-TERM CURRENT USE OF INSULIN (HCC): ICD-10-CM

## 2021-09-07 DIAGNOSIS — E78.49 OTHER HYPERLIPIDEMIA: ICD-10-CM

## 2021-09-07 DIAGNOSIS — F41.9 ANXIETY: ICD-10-CM

## 2021-09-07 DIAGNOSIS — I48.0 PAROXYSMAL ATRIAL FIBRILLATION (HCC): ICD-10-CM

## 2021-09-07 PROCEDURE — 99214 OFFICE O/P EST MOD 30 MIN: CPT | Performed by: FAMILY MEDICINE

## 2021-09-07 PROCEDURE — 3080F DIAST BP >= 90 MM HG: CPT | Performed by: FAMILY MEDICINE

## 2021-09-07 PROCEDURE — 3288F FALL RISK ASSESSMENT DOCD: CPT | Performed by: FAMILY MEDICINE

## 2021-09-07 PROCEDURE — 3725F SCREEN DEPRESSION PERFORMED: CPT | Performed by: FAMILY MEDICINE

## 2021-09-07 PROCEDURE — G0439 PPPS, SUBSEQ VISIT: HCPCS | Performed by: FAMILY MEDICINE

## 2021-09-07 PROCEDURE — 3077F SYST BP >= 140 MM HG: CPT | Performed by: FAMILY MEDICINE

## 2021-09-07 PROCEDURE — 1160F RVW MEDS BY RX/DR IN RCRD: CPT | Performed by: FAMILY MEDICINE

## 2021-09-07 PROCEDURE — 1170F FXNL STATUS ASSESSED: CPT | Performed by: FAMILY MEDICINE

## 2021-09-07 PROCEDURE — 1125F AMNT PAIN NOTED PAIN PRSNT: CPT | Performed by: FAMILY MEDICINE

## 2021-09-07 PROCEDURE — 1036F TOBACCO NON-USER: CPT | Performed by: FAMILY MEDICINE

## 2021-09-07 RX ORDER — SIMVASTATIN 20 MG
20 TABLET ORAL DAILY
Qty: 90 TABLET | Refills: 0 | Status: SHIPPED | OUTPATIENT
Start: 2021-09-07 | End: 2021-10-11 | Stop reason: SDUPTHER

## 2021-09-07 RX ORDER — HYDROCHLOROTHIAZIDE 12.5 MG/1
12.5 TABLET ORAL DAILY
Qty: 90 TABLET | Refills: 3 | Status: SHIPPED | OUTPATIENT
Start: 2021-09-07 | End: 2022-06-22 | Stop reason: SDUPTHER

## 2021-09-07 RX ORDER — LISINOPRIL 20 MG/1
20 TABLET ORAL DAILY
Qty: 90 TABLET | Refills: 3 | Status: SHIPPED | OUTPATIENT
Start: 2021-09-07 | End: 2022-06-22 | Stop reason: SDUPTHER

## 2021-09-07 NOTE — ASSESSMENT & PLAN NOTE
Lab Results   Component Value Date    HGBA1C 6 9 (H) 07/28/2021     improving   Fasting BG range: 101-145, average is 120   Goal A1C <7   Reconciled home medication regimen: trulicity, levemir 97T qhs,    Discussed importance of strict carb control diet and weight reduction   Up to date with diabetic eye exam   Diabetic foot exam up to date   Follow up in 6 months

## 2021-09-07 NOTE — PROGRESS NOTES
Assessment/Plan:    GERD without esophagitis  Stable  · Has occasional symptoms that resolved with tums  · She does not need a daily medication    Type 2 diabetes mellitus with stage 3a chronic kidney disease, with long-term current use of insulin (Formerly [facility])    Lab Results   Component Value Date    HGBA1C 6 9 (H) 07/28/2021     improving   Fasting BG range: 101-145, average is 120   Goal A1C <7   Reconciled home medication regimen: trulicity, levemir 40R qhs,    Discussed importance of strict carb control diet and weight reduction   Up to date with diabetic eye exam   Diabetic foot exam up to date   Follow up in 6 months          Benign essential hypertension  stable   goal bp <140/90, pt's bp is at goal   HBPM: /60-70   Current regimen: Hydrochlorothiazide 12 5 mg, lisinopril 20 mg, and atenolol 50 mg twice a day   Advised patient on symptoms of hypotension & severe HTN   Limit salt-intake & caffeine in diet, minimize stress level   Weight reduction efforts via improved diet & increased exercise recommend 150 minutes a week   DASH diet discussed and encouraged   Discussed importance of treating HTN to prevent ASCVD, CVA      Paroxysmal atrial fibrillation ([facility] [address] )   Follows with Cardiolcogy, Dr Kim  ·  she was last seen on 05/17/2021:  No change to her present regimen    · Continue diltiazem 120mg, atenolol 50mg BID  · On xarelto 20mg  · Denies bleeding    Osteopenia of multiple sites  Stable  · Currently   On vitamin D 2000 units daily  ·  not taking calcium due to history of hypercalcemia    Stage 3a chronic kidney disease (Formerly [facility])  Lab Results   Component Value Date    EGFR 70 07/28/2021    EGFR 61 04/09/2021    EGFR 55 10/19/2020    CREATININE 0 78 07/28/2021    CREATININE 0 88 04/09/2021    CREATININE 0 96 10/19/2020     Stable    Mixed hyperlipidemia  Stable  ·  07/28/2021: LDL -74  ·  continue simvastatin 20 mg daily    Hypercalcemia  Recurrent  ·  vitamin-D 53 2  ·  calcium 10 2 ( 07/28/2021)  ·  repeat CMP and next    Urinary urgency  New  · Similar sensation as when she was previously diagnosed with the UTI in hospital   ·   Vitals and physical exam unremarkable: No CVA tenderness or suprapubic tenderness  ·   Patient unable to provide urine sample today  ·  script for UA with urine culture  ·  discussed ED precautions    Anxiety  Stable  · Occasionally takes hydroxyzine 25mg (3-4 times since 10/2020)  · Is able to manage the majority of her symptoms on her own           Problem List Items Addressed This Visit        Digestive    GERD without esophagitis - Primary     Stable  · Has occasional symptoms that resolved with tums  · She does not need a daily medication            Endocrine    Type 2 diabetes mellitus with stage 3a chronic kidney disease, with long-term current use of insulin (Formerly [facility])       Lab Results   Component Value Date    HGBA1C 6 9 (H) 07/28/2021     improving   Fasting BG range: 101-145, average is 120   Goal A1C <7   Reconciled home medication regimen: trulicity, levemir 53C qhs,    Discussed importance of strict carb control diet and weight reduction   Up to date with diabetic eye exam   Diabetic foot exam up to date   Follow up in 6 months               Relevant Medications    hydrochlorothiazide (HYDRODIURIL) 12 5 mg tablet    Other Relevant Orders    Microalbumin / creatinine urine ratio    HEMOGLOBIN A1C W/ EAG ESTIMATION       Cardiovascular and Mediastinum    Benign essential hypertension     stable   goal bp <140/90, pt's bp is at goal   HBPM: /60-70   Current regimen: Hydrochlorothiazide 12 5 mg, lisinopril 20 mg, and atenolol 50 mg twice a day   Advised patient on symptoms of hypotension & severe HTN   Limit salt-intake & caffeine in diet, minimize stress level   Weight reduction efforts via improved diet & increased exercise recommend 150 minutes a week   DASH diet discussed and encouraged   Discussed importance of treating HTN to prevent ASCVD, CVA           Relevant Medications    lisinopril (ZESTRIL) 20 mg tablet    hydrochlorothiazide (HYDRODIURIL) 12 5 mg tablet    Other Relevant Orders    Microalbumin / creatinine urine ratio    Comprehensive metabolic panel    Paroxysmal atrial fibrillation ([facility] )      Follows with Cardiolcogy, Dr Kim  ·  she was last seen on 05/17/2021:  No change to her present regimen  · Continue diltiazem 120mg, atenolol 50mg BID  · On xarelto 20mg  · Denies bleeding            Musculoskeletal and Integument    Osteopenia of multiple sites     Stable  · Currently   On vitamin D 2000 units daily  ·  not taking calcium due to history of hypercalcemia            Genitourinary    Stage 3a chronic kidney disease ([facility] )     Lab Results   Component Value Date    EGFR 70 07/28/2021    EGFR 61 04/09/2021    EGFR 55 10/19/2020    CREATININE 0 78 07/28/2021    CREATININE 0 88 04/09/2021    CREATININE 0 96 10/19/2020     Stable         Relevant Medications    hydrochlorothiazide (HYDRODIURIL) 12 5 mg tablet    Other Relevant Orders    Comprehensive metabolic panel       Other    Hypercalcemia     Recurrent  ·  vitamin-D 53 2  ·  calcium 10 2 ( 07/28/2021)  ·  repeat CMP and next         Relevant Orders    Comprehensive metabolic panel    Mixed hyperlipidemia     Stable  ·  07/28/2021: LDL -74  ·  continue simvastatin 20 mg daily         Relevant Medications    simvastatin (ZOCOR) 20 mg tablet    Anxiety     Stable  · Occasionally takes hydroxyzine 25mg (3-4 times since 10/2020)  · Is able to manage the majority of her symptoms on her own           Urinary urgency     New  · Similar sensation as when she was previously diagnosed with the UTI in hospital   ·   Vitals and physical exam unremarkable: No CVA tenderness or suprapubic tenderness  ·   Patient unable to provide urine sample today  ·  script for UA with urine culture  ·  discussed ED precautions         Relevant Orders    UA (URINE) with reflex to Scope    Urine culture Other Visit Diagnoses     Other hyperlipidemia        Relevant Medications    simvastatin (ZOCOR) 20 mg tablet            Subjective:      Patient ID: Jacque Lamb is a 82 y o  female presents today for routine follow up and medicare annual wellness  Her complaint today is some urinary urgency  Denies dysuria, hematuria, or abdominal pain  She states this is a similar sensation as when she was diagnosed with the UTI in hospital     The following portions of the patient's history were reviewed and updated as appropriate: allergies, current medications, past family history, past medical history, past social history, past surgical history and problem list     Review of Systems   Constitutional: Negative for activity change, chills, diaphoresis and fever  HENT: Negative for ear pain, hearing loss, postnasal drip, rhinorrhea, sinus pressure, sinus pain, sneezing and sore throat  Respiratory: Negative for cough, chest tightness, shortness of breath and wheezing  Cardiovascular: Negative for chest pain, palpitations and leg swelling  Gastrointestinal: Negative for abdominal pain, blood in stool, constipation, diarrhea, nausea and vomiting  Genitourinary: Positive for urgency  Negative for decreased urine volume, difficulty urinating, dysuria, flank pain, frequency, hematuria and vaginal bleeding  Musculoskeletal: Negative for arthralgias and myalgias  Neurological: Negative for dizziness, syncope, weakness, light-headedness, numbness and headaches  Psychiatric/Behavioral: The patient is nervous/anxious  Objective:      /90 (BP Location: Left arm, Patient Position: Sitting, Cuff Size: Standard)   Pulse 83   Temp 98 4 °F (36 9 °C) (Temporal)   Resp 18   Ht 5' 3" (1 6 m)   Wt 66 5 kg (146 lb 9 6 oz)   SpO2 96%   BMI 25 97 kg/m²          Physical Exam  Vitals reviewed  Constitutional:       General: She is not in acute distress  Appearance: She is well-developed   She is not diaphoretic  HENT:      Head: Normocephalic and atraumatic  Right Ear: External ear normal       Left Ear: External ear normal       Nose: Nose normal  No congestion  Mouth/Throat:      Mouth: Mucous membranes are moist       Pharynx: Oropharynx is clear  No oropharyngeal exudate  Eyes:      General: No scleral icterus  Right eye: No discharge  Left eye: No discharge  Conjunctiva/sclera: Conjunctivae normal       Pupils: Pupils are equal, round, and reactive to light  Neck:      Thyroid: No thyromegaly  Vascular: No JVD  Trachea: No tracheal deviation  Cardiovascular:      Rate and Rhythm: Normal rate and regular rhythm  Heart sounds: Normal heart sounds  No murmur heard  No friction rub  No gallop  Pulmonary:      Effort: Pulmonary effort is normal  No respiratory distress  Breath sounds: Normal breath sounds  No wheezing or rales  Chest:      Chest wall: No tenderness  Abdominal:      General: Abdomen is flat  Bowel sounds are normal  There is no distension  Palpations: Abdomen is soft  Tenderness: There is no abdominal tenderness  There is no right CVA tenderness, left CVA tenderness, guarding or rebound  Musculoskeletal:         General: Normal range of motion  Cervical back: Normal range of motion and neck supple  No tenderness  Right lower leg: No edema  Left lower leg: No edema  Lymphadenopathy:      Cervical: No cervical adenopathy  Skin:     General: Skin is warm and dry  Capillary Refill: Capillary refill takes less than 2 seconds  Findings: No bruising  Neurological:      Mental Status: She is alert and oriented to person, place, and time  Mental status is at baseline  Psychiatric:         Mood and Affect: Mood normal          Behavior: Behavior normal          Thought Content:  Thought content normal          Judgment: Judgment normal

## 2021-09-07 NOTE — ASSESSMENT & PLAN NOTE
Stable  · Currently   On vitamin D 2000 units daily  ·  not taking calcium due to history of hypercalcemia

## 2021-09-07 NOTE — ASSESSMENT & PLAN NOTE
stable   goal bp <140/90, pt's bp is at goal   HBPM: /60-70   Current regimen: Hydrochlorothiazide 12 5 mg, lisinopril 20 mg, and atenolol 50 mg twice a day   Advised patient on symptoms of hypotension & severe HTN   Limit salt-intake & caffeine in diet, minimize stress level   Weight reduction efforts via improved diet & increased exercise recommend 150 minutes a week   DASH diet discussed and encouraged   Discussed importance of treating HTN to prevent ASCVD, CVA

## 2021-09-07 NOTE — PATIENT INSTRUCTIONS
Medicare Preventive Visit Patient Instructions  Thank you for completing your Welcome to Medicare Visit or Medicare Annual Wellness Visit today  Your next wellness visit will be due in one year (9/8/2022)  The screening/preventive services that you may require over the next 5-10 years are detailed below  Some tests may not apply to you based off risk factors and/or age  Screening tests ordered at today's visit but not completed yet may show as past due  Also, please note that scanned in results may not display below  Preventive Screenings:  Service Recommendations Previous Testing/Comments   Colorectal Cancer Screening  * Colonoscopy    * Fecal Occult Blood Test (FOBT)/Fecal Immunochemical Test (FIT)  * Fecal DNA/Cologuard Test  * Flexible Sigmoidoscopy Age: 54-65 years old   Colonoscopy: every 10 years (may be performed more frequently if at higher risk)  OR  FOBT/FIT: every 1 year  OR  Cologuard: every 3 years  OR  Sigmoidoscopy: every 5 years  Screening may be recommended earlier than age 48 if at higher risk for colorectal cancer  Also, an individualized decision between you and your healthcare provider will decide whether screening between the ages of 74-80 would be appropriate  Colonoscopy: 02/16/2017  FOBT/FIT: Not on file  Cologuard: Not on file  Sigmoidoscopy: Not on file          Breast Cancer Screening Age: 36 years old  Frequency: every 1-2 years  Not required if history of left and right mastectomy Mammogram: 09/09/2020    Screening Current   Cervical Cancer Screening Between the ages of 21-29, pap smear recommended once every 3 years  Between the ages of 33-67, can perform pap smear with HPV co-testing every 5 years     Recommendations may differ for women with a history of total hysterectomy, cervical cancer, or abnormal pap smears in past  Pap Smear: Not on file    Screening Not Indicated   Hepatitis C Screening Once for adults born between 1945 and 1965  More frequently in patients at high risk for Hepatitis C Hep C Antibody: Not on file        Diabetes Screening 1-2 times per year if you're at risk for diabetes or have pre-diabetes Fasting glucose: 103 mg/dL   A1C: 6 9 %    Screening Not Indicated  History Diabetes   Cholesterol Screening Once every 5 years if you don't have a lipid disorder  May order more often based on risk factors  Lipid panel: 07/28/2021    Screening Not Indicated  History Lipid Disorder     Other Preventive Screenings Covered by Medicare:  1  Abdominal Aortic Aneurysm (AAA) Screening: covered once if your at risk  You're considered to be at risk if you have a family history of AAA  2  Lung Cancer Screening: covers low dose CT scan once per year if you meet all of the following conditions: (1) Age 50-69; (2) No signs or symptoms of lung cancer; (3) Current smoker or have quit smoking within the last 15 years; (4) You have a tobacco smoking history of at least 30 pack years (packs per day multiplied by number of years you smoked); (5) You get a written order from a healthcare provider  3  Glaucoma Screening: covered annually if you're considered high risk: (1) You have diabetes OR (2) Family history of glaucoma OR (3)  aged 48 and older OR (3)  American aged 72 and older  3  Osteoporosis Screening: covered every 2 years if you meet one of the following conditions: (1) You're estrogen deficient and at risk for osteoporosis based off medical history and other findings; (2) Have a vertebral abnormality; (3) On glucocorticoid therapy for more than 3 months; (4) Have primary hyperparathyroidism; (5) On osteoporosis medications and need to assess response to drug therapy  · Last bone density test (DXA Scan): 09/09/2020   5  HIV Screening: covered annually if you're between the age of 15-65  Also covered annually if you are younger than 13 and older than 72 with risk factors for HIV infection   For pregnant patients, it is covered up to 3 times per pregnancy  Immunizations:  Immunization Recommendations   Influenza Vaccine Annual influenza vaccination during flu season is recommended for all persons aged >= 6 months who do not have contraindications   Pneumococcal Vaccine (Prevnar and Pneumovax)  * Prevnar = PCV13  * Pneumovax = PPSV23   Adults 25-60 years old: 1-3 doses may be recommended based on certain risk factors  Adults 72 years old: Prevnar (PCV13) vaccine recommended followed by Pneumovax (PPSV23) vaccine  If already received PPSV23 since turning 65, then PCV13 recommended at least one year after PPSV23 dose  Hepatitis B Vaccine 3 dose series if at intermediate or high risk (ex: diabetes, end stage renal disease, liver disease)   Tetanus (Td) Vaccine - COST NOT COVERED BY MEDICARE PART B Following completion of primary series, a booster dose should be given every 10 years to maintain immunity against tetanus  Td may also be given as tetanus wound prophylaxis  Tdap Vaccine - COST NOT COVERED BY MEDICARE PART B Recommended at least once for all adults  For pregnant patients, recommended with each pregnancy  Shingles Vaccine (Shingrix) - COST NOT COVERED BY MEDICARE PART B  2 shot series recommended in those aged 48 and above     Health Maintenance Due:      Topic Date Due    DXA SCAN  09/09/2022     Immunizations Due:      Topic Date Due    DTaP,Tdap,and Td Vaccines (1 - Tdap) 07/19/1956    Influenza Vaccine (1) 09/01/2021     Advance Directives   What are advance directives? Advance directives are legal documents that state your wishes and plans for medical care  These plans are made ahead of time in case you lose your ability to make decisions for yourself  Advance directives can apply to any medical decision, such as the treatments you want, and if you want to donate organs  What are the types of advance directives? There are many types of advance directives, and each state has rules about how to use them   You may choose a combination of any of the following:  · Living will: This is a written record of the treatment you want  You can also choose which treatments you do not want, which to limit, and which to stop at a certain time  This includes surgery, medicine, IV fluid, and tube feedings  · Durable power of  for healthcare [place]): This is a written record that states who you want to make healthcare choices for you when you are unable to make them for yourself  This person, called a proxy, is usually a family member or a friend  You may choose more than 1 proxy  · Do not resuscitate (DNR) order:  A DNR order is used in case your heart stops beating or you stop breathing  It is a request not to have certain forms of treatment, such as CPR  A DNR order may be included in other types of advance directives  · Medical directive: This covers the care that you want if you are in a coma, near death, or unable to make decisions for yourself  You can list the treatments you want for each condition  Treatment may include pain medicine, surgery, blood transfusions, dialysis, IV or tube feedings, and a ventilator (breathing machine)  · Values history: This document has questions about your views, beliefs, and how you feel and think about life  This information can help others choose the care that you would choose  Why are advance directives important? An advance directive helps you control your care  Although spoken wishes may be used, it is better to have your wishes written down  Spoken wishes can be misunderstood, or not followed  Treatments may be given even if you do not want them  An advance directive may make it easier for your family to make difficult choices about your care  Urinary Incontinence   Urinary incontinence (UI)  is when you lose control of your bladder  UI develops because your bladder cannot store or empty urine properly   The 3 most common types of UI are stress incontinence, urge incontinence, or both   Medicines:   · May be given to help strengthen your bladder control  Report any side effects of medication to your healthcare provider  Do pelvic muscle exercises often:  Your pelvic muscles help you stop urinating  Squeeze these muscles tight for 5 seconds, then relax for 5 seconds  Gradually work up to squeezing for 10 seconds  Do 3 sets of 15 repetitions a day, or as directed  This will help strengthen your pelvic muscles and improve bladder control  Train your bladder:  Go to the bathroom at set times, such as every 2 hours, even if you do not feel the urge to go  You can also try to hold your urine when you feel the urge to go  For example, hold your urine for 5 minutes when you feel the urge to go  As that becomes easier, hold your urine for 10 minutes  Self-care:   · Keep a UI record  Write down how often you leak urine and how much you leak  Make a note of what you were doing when you leaked urine  · Drink liquids as directed  You may need to limit the amount of liquid you drink to help control your urine leakage  Do not drink any liquid right before you go to bed  Limit or do not have drinks that contain caffeine or alcohol  · Prevent constipation  Eat a variety of high-fiber foods  Good examples are high-fiber cereals, beans, vegetables, and whole-grain breads  Walking is the best way to trigger your intestines to have a bowel movement  · Exercise regularly and maintain a healthy weight  Weight loss and exercise will decrease pressure on your bladder and help you control your leakage  · Use a catheter as directed  to help empty your bladder  A catheter is a tiny, plastic tube that is put into your bladder to drain your urine  · Go to behavior therapy as directed  Behavior therapy may be used to help you learn to control your urge to urinate      Weight Management   Why it is important to manage your weight:  Being overweight increases your risk of health conditions such as heart disease, high blood pressure, type 2 diabetes, and certain types of cancer  It can also increase your risk for osteoarthritis, sleep apnea, and other respiratory problems  Aim for a slow, steady weight loss  Even a small amount of weight loss can lower your risk of health problems  How to lose weight safely:  A safe and healthy way to lose weight is to eat fewer calories and get regular exercise  You can lose up about 1 pound a week by decreasing the number of calories you eat by 500 calories each day  Healthy meal plan for weight management:  A healthy meal plan includes a variety of foods, contains fewer calories, and helps you stay healthy  A healthy meal plan includes the following:  · Eat whole-grain foods more often  A healthy meal plan should contain fiber  Fiber is the part of grains, fruits, and vegetables that is not broken down by your body  Whole-grain foods are healthy and provide extra fiber in your diet  Some examples of whole-grain foods are whole-wheat breads and pastas, oatmeal, brown rice, and bulgur  · Eat a variety of vegetables every day  Include dark, leafy greens such as spinach, kale, lisha greens, and mustard greens  Eat yellow and orange vegetables such as carrots, sweet potatoes, and winter squash  · Eat a variety of fruits every day  Choose fresh or canned fruit (canned in its own juice or light syrup) instead of juice  Fruit juice has very little or no fiber  · Eat low-fat dairy foods  Drink fat-free (skim) milk or 1% milk  Eat fat-free yogurt and low-fat cottage cheese  Try low-fat cheeses such as mozzarella and other reduced-fat cheeses  · Choose meat and other protein foods that are low in fat  Choose beans or other legumes such as split peas or lentils  Choose fish, skinless poultry (chicken or turkey), or lean cuts of red meat (beef or pork)  Before you cook meat or poultry, cut off any visible fat  · Use less fat and oil  Try baking foods instead of frying them  Add less fat, such as margarine, sour cream, regular salad dressing and mayonnaise to foods  Eat fewer high-fat foods  Some examples of high-fat foods include french fries, doughnuts, ice cream, and cakes  · Eat fewer sweets  Limit foods and drinks that are high in sugar  This includes candy, cookies, regular soda, and sweetened drinks  Exercise:  Exercise at least 30 minutes per day on most days of the week  Some examples of exercise include walking, biking, dancing, and swimming  You can also fit in more physical activity by taking the stairs instead of the elevator or parking farther away from stores  Ask your healthcare provider about the best exercise plan for you  © Copyright [employer] 2018 Information is for End User's use only and may not be sold, redistributed or otherwise used for commercial purposes   All illustrations and images included in CareNotes® are the copyrighted property of [employer]  or [address] [place] [address]

## 2021-09-07 NOTE — ASSESSMENT & PLAN NOTE
Follows with Cardiolcogy, Dr Kim  ·  she was last seen on 05/17/2021:  No change to her present regimen    · Continue diltiazem 120mg, atenolol 50mg BID  · On xarelto 20mg  · Denies bleeding Kj Villalpando is a 73year old female. Patient presents with:  Consult: discuss EKG  Dyspnea: slight dyspnea w/ exertion    HPI:   Patient comes in today for consultation.  She sees Dr. Morgan Quiroz she has history of hypertension although she is taking medicat Vaping Use: Never used    Alcohol use: Not Currently      Comment: a year ago    Drug use: Never       REVIEW OF SYSTEMS:   GENERAL HEALTH: feels well otherwise  SKIN: denies any unusual skin lesions or rashes  RESPIRATORY: denies shortness of breath with

## 2021-09-07 NOTE — ASSESSMENT & PLAN NOTE
Stable  · Occasionally takes hydroxyzine 25mg (3-4 times since 10/2020)  · Is able to manage the majority of her symptoms on her own

## 2021-09-07 NOTE — ASSESSMENT & PLAN NOTE
New  · Similar sensation as when she was previously diagnosed with the UTI in hospital   ·   Vitals and physical exam unremarkable: No CVA tenderness or suprapubic tenderness  ·   Patient unable to provide urine sample today  ·  script for UA with urine culture  ·  discussed ED precautions

## 2021-09-07 NOTE — ASSESSMENT & PLAN NOTE
Lab Results   Component Value Date    EGFR 70 07/28/2021    EGFR 61 04/09/2021    EGFR 55 10/19/2020    CREATININE 0 78 07/28/2021    CREATININE 0 88 04/09/2021    CREATININE 0 96 10/19/2020     Stable

## 2021-09-07 NOTE — PROGRESS NOTES
Assessment and Plan:     Problem List Items Addressed This Visit        Digestive    GERD without esophagitis - Primary     Stable  · Has occasional symptoms that resolved with tums  · She does not need a daily medication            Endocrine    Type 2 diabetes mellitus with stage 3a chronic kidney disease, with long-term current use of insulin ([facility])       Lab Results   Component Value Date    HGBA1C 6 9 (H) 07/28/2021     improving   Fasting BG range: 101-145, average is 120   Goal A1C <7   Reconciled home medication regimen: trulicity, levemir 24D qhs,    Discussed importance of strict carb control diet and weight reduction   Up to date with diabetic eye exam   Diabetic foot exam up to date   Follow up in 6 months               Relevant Medications    hydrochlorothiazide (HYDRODIURIL) 12 5 mg tablet    Other Relevant Orders    Microalbumin / creatinine urine ratio    HEMOGLOBIN A1C W/ EAG ESTIMATION       Cardiovascular and Mediastinum    Benign essential hypertension     stable   goal bp <140/90, pt's bp is at goal   HBPM: /60-70   Current regimen: Hydrochlorothiazide 12 5 mg, lisinopril 20 mg, and atenolol 50 mg twice a day   Advised patient on symptoms of hypotension & severe HTN   Limit salt-intake & caffeine in diet, minimize stress level   Weight reduction efforts via improved diet & increased exercise recommend 150 minutes a week   DASH diet discussed and encouraged   Discussed importance of treating HTN to prevent ASCVD, CVA           Relevant Medications    lisinopril (ZESTRIL) 20 mg tablet    hydrochlorothiazide (HYDRODIURIL) 12 5 mg tablet    Other Relevant Orders    Microalbumin / creatinine urine ratio    Comprehensive metabolic panel    Paroxysmal atrial fibrillation ([facility] [address] )      Follows with Cardiolcogy, Dr Kim  ·  she was last seen on 05/17/2021:  No change to her present regimen    · Continue diltiazem 120mg, atenolol 50mg BID  · On xarelto 20mg  · Denies bleeding Musculoskeletal and Integument    Osteopenia of multiple sites     Stable  · Currently   On vitamin D 2000 units daily  ·  not taking calcium due to history of hypercalcemia            Genitourinary    Stage 3a chronic kidney disease ([facility] [address] )     Lab Results   Component Value Date    EGFR 70 07/28/2021    EGFR 61 04/09/2021    EGFR 55 10/19/2020    CREATININE 0 78 07/28/2021    CREATININE 0 88 04/09/2021    CREATININE 0 96 10/19/2020     Stable         Relevant Medications    hydrochlorothiazide (HYDRODIURIL) 12 5 mg tablet    Other Relevant Orders    Comprehensive metabolic panel       Other    Hypercalcemia     Recurrent  ·  vitamin-D 53 2  ·  calcium 10 2 ( 07/28/2021)  ·  repeat CMP and next         Relevant Orders    Comprehensive metabolic panel    Mixed hyperlipidemia     Stable  ·  07/28/2021: LDL -74  ·  continue simvastatin 20 mg daily         Relevant Medications    simvastatin (ZOCOR) 20 mg tablet    Anxiety     Stable  · Occasionally takes hydroxyzine 25mg (3-4 times since 10/2020)  · Is able to manage the majority of her symptoms on her own  Urinary urgency     New  · Her symptoms feel like a UTI she had in the past    · She is unable to provide a urine sample today  · No CVA tenderness or suprapubic tenderness         Relevant Orders    UA (URINE) with reflex to Scope    Urine culture      Other Visit Diagnoses     Other hyperlipidemia        Relevant Medications    simvastatin (ZOCOR) 20 mg tablet           Preventive health issues were discussed with patient, and age appropriate screening tests were ordered as noted in patient's After Visit Summary  Personalized health advice and appropriate referrals for health education or preventive services given if needed, as noted in patient's After Visit Summary       History of Present Illness:     Patient presents for Medicare Annual Wellness visit    Patient Care Team:  Kenzie Platt DO as PCP - General (Family Medicine)  Bala Rapp MD as PCP - PCP-[facility] Attributed-Roster     Problem List:     Patient Active Problem List   Diagnosis    Benign essential hypertension    Prolapse of anterior vaginal wall    Fibrocystic breast disease    Hypercalcemia    Incontinence    Kyphosis    Microproteinuria    Ventricular premature depolarization    Tinnitus    Paroxysmal atrial fibrillation (HCC)    Mixed hyperlipidemia    Type 2 diabetes mellitus with stage 3a chronic kidney disease, with long-term current use of insulin (HCC)    Splenic artery aneurysm (HCC)    Thyroid nodule    Vitamin D deficiency    BMI 28 0-28 9,adult    Osteopenia of multiple sites    Stage 3a chronic kidney disease ([facility] [address] )    Dysequilibrium    Anxiety    Palpitations    Hypoalbuminemia    Adjustment disorder with anxious mood    [place] grade 2 cystocele    Type 2 diabetes mellitus with hyperglycemia (HCC)    GERD without esophagitis    Urinary urgency      Past Medical and Surgical History:     Past Medical History:   Diagnosis Date    Anemia     IRON DEF ANEMIA DUE TO MENORRHAGIA    Diabetes mellitus ([facility] [address] )     Hypercalcemia 10/06/2010    MILD  DECREASES HCTZ    Hypertension     [facility] 11/2011    IN SITU  MID BACK    Palpitations 05/28/2018    SINUS TACHYCARDIA  TROPONIN SPLL    Skin cancer 2017    NOSE     Past Surgical History:   Procedure Laterality Date    GALLBLADDER SURGERY  1991    HYSTERECTOMY  1995    UTERINE PROLAPSE VAGINAL POLYPS    HYSTERECTOMY      IRON REPLACEMENT    OTHER SURGICAL HISTORY  11/2011    EXCISION MELANOMA IN SITU MID BACK    PARATHYROIDECTOMY  05/21/2018      Family History:     Family History   Problem Relation Age of Onset    Other Mother         TESTED(-)    Colon cancer Father 61    Prostate cancer Father 79    Factor V Leiden deficiency Daughter     Factor V Leiden deficiency Daughter     Breast cancer Sister         in her 63's    No Known Problems Maternal Grandmother     No Known Problems Maternal Grandfather     No Known Problems Paternal Grandmother     No Known Problems Paternal Grandfather     No Known Problems Sister     No Known Problems Sister     No Known Problems Maternal Aunt     No Known Problems Maternal Aunt     No Known Problems Maternal Aunt     No Known Problems Maternal Aunt     No Known Problems Maternal Aunt     No Known Problems Maternal Aunt     No Known Problems Maternal Aunt     No Known Problems Paternal Aunt     No Known Problems Paternal Aunt     No Known Problems Paternal Aunt       Social History:     Social History     Socioeconomic History    Marital status: /Civil Union     Spouse name: None    Number of children: None    Years of education: None    Highest education level: None   Occupational History    None   Tobacco Use    Smoking status: Never Smoker    Smokeless tobacco: Never Used   Vaping Use    Vaping Use: Never used   Substance and Sexual Activity    Alcohol use: No    Drug use: No    Sexual activity: Never   Other Topics Concern    None   Social History Narrative    None     Social Determinants of Health     Financial Resource Strain:     Difficulty of Paying Living Expenses:    Food Insecurity:     Worried About Running Out of Food in the Last Year:     Ran Out of Food in the Last Year:    Transportation Needs:     Lack of Transportation (Medical):      Lack of Transportation (Non-Medical):    Physical Activity:     Days of Exercise per Week:     Minutes of Exercise per Session:    Stress:     Feeling of Stress :    Social Connections:     Frequency of Communication with Friends and Family:     Frequency of Social Gatherings with Friends and Family:     Attends [employer] Services:     Active Member of Clubs or Organizations:     Attends Club or Organization Meetings:     Marital Status:    Intimate Partner Violence:     Fear of Current or Ex-Partner:     Emotionally Abused:     Physically Abused:     Sexually Abused:       Medications and Allergies:     Current Outpatient Medications   Medication Sig Dispense Refill    atenolol (TENORMIN) 50 mg tablet Take 1 tablet (50 mg total) by mouth 2 (two) times a day 180 tablet 3    BD Pen Needle Glory U/F 32G X 4 MM MISC TEST DAILY AS DIRECTED 100 each 3    Blood Glucose Monitoring Suppl (Accu-Chek Yelitza Plus) w/Device KIT Use daily 1 kit 0    diltiazem (CARDIZEM CD) 120 mg 24 hr capsule TAKE 1 CAPSULE BY MOUTH EVERY DAY 90 capsule 3    Dulaglutide (Trulicity) 5 86 AGUIRRE/7 6DA SOPN Inject 0 5 mL (0 75 mg total) under the skin once a week 5 mL 5    glucose blood (Accu-Chek Yelitza Plus) test strip Use 1 each daily Use as instructed 100 strip 3    hydrochlorothiazide (HYDRODIURIL) 12 5 mg tablet Take 1 tablet (12 5 mg total) by mouth daily 90 tablet 3    hydrOXYzine HCL (ATARAX) 25 mg tablet TAKE 1 TABLET TWICE DAILY AS NEEDED FOR ANXIETY 60 tablet 3    Lancets (accu-chek multiclix) lancets Use daily Use as instructed 100 each 3    Levemir FlexTouch 100 units/mL injection pen INJECT 14 UNITS UNDER THE SKIN DAILY AT BEDTIME 15 mL 3    lisinopril (ZESTRIL) 20 mg tablet Take 1 tablet (20 mg total) by mouth daily 90 tablet 3    MICROLET LANCETS MISC by Does not apply route daily 50 each 3    simvastatin (ZOCOR) 20 mg tablet Take 1 tablet (20 mg total) by mouth daily 90 tablet 0    Trulicity 3 91 FR/2 0SY SOPN INJECT 0 5 ML (0 75 MG TOTAL) UNDER THE SKIN ONCE A WEEK 5 mL 5    Vitamin D, Cholecalciferol, 50 MCG (2000 UT) CAPS Take 1 capsule by mouth daily      Xarelto 20 MG tablet TAKE 1 TABLET (20 MG TOTAL) BY MOUTH EVERY 24 HOURS 90 tablet 3     No current facility-administered medications for this visit       Allergies   Allergen Reactions    No Active Allergies       Immunizations:     Immunization History   Administered Date(s) Administered    H1N1, All Formulations 12/22/2009    INFLUENZA 10/12/2007, 10/16/2008, 10/15/2009, 09/23/2015, 11/16/2016, 11/16/2016, 10/04/2017    Influenza Split 10/06/2010, 10/11/2011, 10/27/2012, 10/08/2013    Influenza Split High Dose Preservative Free IM 09/25/2014, 10/04/2017    Influenza, high dose seasonal 0 7 mL 10/15/2018, 10/22/2019, 09/14/2020    Influenza, seasonal, injectable 10/16/2008, 10/15/2009, 09/23/2015    Pneumococcal Conjugate 13-Valent 03/30/2015    Pneumococcal Polysaccharide PPV23 12/05/1997, 10/06/2010    SARS-CoV-2 / COVID-19 mRNA IM (Moderna) 01/14/2021, 02/11/2021    Td (adult), adsorbed 04/12/1984, 07/18/2011    Zoster 04/08/2009, 04/08/2009    Zoster Vaccine Recombinant 06/25/2019, 09/30/2019      Health Maintenance:         Topic Date Due    DXA SCAN  09/09/2022         Topic Date Due    DTaP,Tdap,and Td Vaccines (1 - Tdap) 07/19/1956    Influenza Vaccine (1) 09/01/2021      Medicare Health Risk Assessment:     /90 (BP Location: Left arm, Patient Position: Sitting, Cuff Size: Standard)   Pulse 83   Temp 98 4 °F (36 9 °C) (Temporal)   Resp 18   Ht 5' 3" (1 6 m)   Wt 66 5 kg (146 lb 9 6 oz)   SpO2 96%   BMI 25 97 kg/m²      Juwan Pena is here for her Subsequent Wellness visit  Last Medicare Wellness visit information reviewed, patient interviewed and updates made to the record today  Health Risk Assessment:   Patient rates overall health as good  Patient feels that their physical health rating is slightly worse  Patient is very satisfied with their life  Eyesight was rated as same  Hearing was rated as same  Patient feels that their emotional and mental health rating is same  Patients states they are never, rarely angry  Patient states they are sometimes unusually tired/fatigued  Pain experienced in the last 7 days has been some  Patient's pain rating has been 4/10  Patient states that she has experienced no weight loss or gain in last 6 months  Depression Screening:   PHQ-2 Score: 0      Fall Risk Screening:    In the past year, patient has experienced: no history of falling in past year      Urinary Incontinence Screening:   Patient has leaked urine accidently in the last six months  Home Safety:  Patient does not have trouble with stairs inside or outside of their home  Patient has working smoke alarms and has no working carbon monoxide detector  Home safety hazards include: loose rugs on the floor  Nutrition:   Current diet is Regular  Medications:   Patient is not currently taking any over-the-counter supplements  Patient is able to manage medications  Activities of Daily Living (ADLs)/Instrumental Activities of Daily Living (IADLs):   Walk and transfer into and out of bed and chair?: Yes  Dress and groom yourself?: Yes    Bathe or shower yourself?: Yes    Feed yourself?  Yes  Do your laundry/housekeeping?: Yes  Manage your money, pay your bills and track your expenses?: Yes  Make your own meals?: Yes    Do your own shopping?: Yes    Previous Hospitalizations:   Any hospitalizations or ED visits within the last 12 months?: Yes      Advance Care Planning:   Living will: Yes    Durable POA for healthcare: No    Advanced directive: Yes    Advanced directive counseling given: Yes    Five wishes given: No    End of Life Decisions reviewed with patient: Yes    Provider agrees with end of life decisions: Yes      Cognitive Screening:   Provider or family/friend/caregiver concerned regarding cognition?: No    PREVENTIVE SCREENINGS      Cardiovascular Screening:    General: Screening Not Indicated and History Lipid Disorder      Diabetes Screening:     General: Screening Not Indicated and History Diabetes      Colorectal Cancer Screening:     General: Screening Not Indicated      Breast Cancer Screening:     General: Screening Current      Cervical Cancer Screening:    General: Screening Not Indicated      Osteoporosis Screening:    General: Screening Current      Abdominal Aortic Aneurysm (AAA) Screening:        General: Screening Not Indicated      Lung Cancer Screening: General: Screening Not Indicated      Hepatitis C Screening:    General: Screening Current    Other Counseling Topics:   Car/seat belt/driving safety, skin self-exam, sunscreen and calcium and vitamin D intake and regular weightbearing exercise   Goes to regular full body scans by dermatology      Lisandro Kelley,

## 2021-09-08 ENCOUNTER — CLINICAL SUPPORT (OUTPATIENT)
Dept: FAMILY MEDICINE CLINIC | Facility: CLINIC | Age: 86
End: 2021-09-08

## 2021-09-08 DIAGNOSIS — R39.15 URINARY URGENCY: ICD-10-CM

## 2021-09-08 LAB
BILIRUB UR QL STRIP: NEGATIVE
CLARITY UR: CLEAR
COLOR UR: YELLOW
GLUCOSE UR STRIP-MCNC: NEGATIVE MG/DL
HGB UR QL STRIP.AUTO: NEGATIVE
KETONES UR STRIP-MCNC: NEGATIVE MG/DL
LEUKOCYTE ESTERASE UR QL STRIP: NEGATIVE
NITRITE UR QL STRIP: NEGATIVE
PH UR STRIP.AUTO: 7 [PH]
PROT UR STRIP-MCNC: NEGATIVE MG/DL
SP GR UR STRIP.AUTO: 1.01 (ref 1–1.03)
UROBILINOGEN UR QL STRIP.AUTO: 0.2 E.U./DL

## 2021-09-08 PROCEDURE — 81003 URINALYSIS AUTO W/O SCOPE: CPT | Performed by: FAMILY MEDICINE

## 2021-09-30 DIAGNOSIS — E11.9 TYPE 2 DIABETES MELLITUS WITHOUT COMPLICATION, UNSPECIFIED WHETHER LONG TERM INSULIN USE (HCC): ICD-10-CM

## 2021-09-30 RX ORDER — BLOOD-GLUCOSE METER
EACH MISCELLANEOUS
Qty: 1 KIT | Refills: 0 | Status: SHIPPED | OUTPATIENT
Start: 2021-09-30

## 2021-10-11 DIAGNOSIS — E78.49 OTHER HYPERLIPIDEMIA: ICD-10-CM

## 2021-10-11 RX ORDER — SIMVASTATIN 20 MG
20 TABLET ORAL DAILY
Qty: 90 TABLET | Refills: 3 | Status: SHIPPED | OUTPATIENT
Start: 2021-10-11 | End: 2022-01-07 | Stop reason: SDUPTHER

## 2021-10-14 ENCOUNTER — HOSPITAL ENCOUNTER (OUTPATIENT)
Dept: NON INVASIVE DIAGNOSTICS | Facility: CLINIC | Age: 86
Discharge: HOME | End: 2021-10-14
Payer: COMMERCIAL

## 2021-10-14 DIAGNOSIS — I72.8 SPLENIC ARTERY ANEURYSM (HCC): ICD-10-CM

## 2021-10-14 PROCEDURE — 93975 VASCULAR STUDY: CPT

## 2021-10-14 PROCEDURE — 93975 VASCULAR STUDY: CPT | Performed by: SURGERY

## 2021-10-18 ENCOUNTER — CLINICAL SUPPORT (OUTPATIENT)
Dept: FAMILY MEDICINE CLINIC | Facility: CLINIC | Age: 86
End: 2021-10-18
Payer: COMMERCIAL

## 2021-10-18 DIAGNOSIS — Z23 NEED FOR IMMUNIZATION AGAINST INFLUENZA: Primary | ICD-10-CM

## 2021-10-18 PROCEDURE — 90662 IIV NO PRSV INCREASED AG IM: CPT

## 2021-10-18 PROCEDURE — G0008 ADMIN INFLUENZA VIRUS VAC: HCPCS

## 2021-10-20 DIAGNOSIS — E11.22 TYPE 2 DIABETES MELLITUS WITH STAGE 1 CHRONIC KIDNEY DISEASE, WITH LONG-TERM CURRENT USE OF INSULIN (HCC): ICD-10-CM

## 2021-10-20 DIAGNOSIS — Z79.4 TYPE 2 DIABETES MELLITUS WITH STAGE 1 CHRONIC KIDNEY DISEASE, WITH LONG-TERM CURRENT USE OF INSULIN (HCC): ICD-10-CM

## 2021-10-20 DIAGNOSIS — N18.1 TYPE 2 DIABETES MELLITUS WITH STAGE 1 CHRONIC KIDNEY DISEASE, WITH LONG-TERM CURRENT USE OF INSULIN (HCC): ICD-10-CM

## 2021-10-20 RX ORDER — DULAGLUTIDE 0.75 MG/.5ML
0.75 INJECTION, SOLUTION SUBCUTANEOUS WEEKLY
Qty: 5 ML | Refills: 5 | Status: SHIPPED | OUTPATIENT
Start: 2021-10-20 | End: 2022-06-22 | Stop reason: SDUPTHER

## 2021-10-22 RX ORDER — ERGOCALCIFEROL (VITAMIN D2) 1250 MCG
50000 CAPSULE ORAL
COMMUNITY
End: 2022-03-10

## 2021-10-27 ENCOUNTER — OFFICE VISIT (OUTPATIENT)
Dept: VASCULAR SURGERY | Facility: CLINIC | Age: 86
End: 2021-10-27
Payer: COMMERCIAL

## 2021-10-27 VITALS
DIASTOLIC BLOOD PRESSURE: 80 MMHG | BODY MASS INDEX: 26.22 KG/M2 | HEIGHT: 63 IN | SYSTOLIC BLOOD PRESSURE: 184 MMHG | WEIGHT: 148 LBS | HEART RATE: 84 BPM

## 2021-10-27 DIAGNOSIS — E78.2 MIXED HYPERLIPIDEMIA: ICD-10-CM

## 2021-10-27 DIAGNOSIS — I72.8 SPLENIC ARTERY ANEURYSM (HCC): Primary | ICD-10-CM

## 2021-10-27 DIAGNOSIS — I10 BENIGN ESSENTIAL HYPERTENSION: ICD-10-CM

## 2021-10-27 PROCEDURE — 1160F RVW MEDS BY RX/DR IN RCRD: CPT | Performed by: PHYSICIAN ASSISTANT

## 2021-10-27 PROCEDURE — 3079F DIAST BP 80-89 MM HG: CPT | Performed by: PHYSICIAN ASSISTANT

## 2021-10-27 PROCEDURE — 1036F TOBACCO NON-USER: CPT | Performed by: PHYSICIAN ASSISTANT

## 2021-10-27 PROCEDURE — 99214 OFFICE O/P EST MOD 30 MIN: CPT | Performed by: PHYSICIAN ASSISTANT

## 2021-10-27 PROCEDURE — 3077F SYST BP >= 140 MM HG: CPT | Performed by: PHYSICIAN ASSISTANT

## 2021-11-29 ENCOUNTER — OFFICE VISIT (OUTPATIENT)
Dept: FAMILY MEDICINE CLINIC | Facility: CLINIC | Age: 86
End: 2021-11-29
Payer: COMMERCIAL

## 2021-11-29 VITALS
TEMPERATURE: 96.9 F | DIASTOLIC BLOOD PRESSURE: 104 MMHG | BODY MASS INDEX: 25.8 KG/M2 | HEART RATE: 83 BPM | OXYGEN SATURATION: 99 % | WEIGHT: 145.6 LBS | SYSTOLIC BLOOD PRESSURE: 174 MMHG | RESPIRATION RATE: 18 BRPM | HEIGHT: 63 IN

## 2021-11-29 DIAGNOSIS — N39.0 URINARY TRACT INFECTION WITHOUT HEMATURIA, SITE UNSPECIFIED: Primary | ICD-10-CM

## 2021-11-29 LAB
BACTERIA UR QL AUTO: ABNORMAL /HPF
BILIRUB UR QL STRIP: NEGATIVE
CLARITY UR: ABNORMAL
COLOR UR: YELLOW
GLUCOSE UR STRIP-MCNC: NEGATIVE MG/DL
HGB UR QL STRIP.AUTO: ABNORMAL
KETONES UR STRIP-MCNC: NEGATIVE MG/DL
LEUKOCYTE ESTERASE UR QL STRIP: ABNORMAL
NITRITE UR QL STRIP: NEGATIVE
NON-SQ EPI CELLS URNS QL MICRO: ABNORMAL /HPF
PH UR STRIP.AUTO: 7 [PH]
PROT UR STRIP-MCNC: NEGATIVE MG/DL
RBC #/AREA URNS AUTO: ABNORMAL /HPF
SL AMB  POCT GLUCOSE, UA: NEGATIVE
SL AMB LEUKOCYTE ESTERASE,UA: ABNORMAL
SL AMB POCT BILIRUBIN,UA: NEGATIVE
SL AMB POCT BLOOD,UA: ABNORMAL
SL AMB POCT CLARITY,UA: ABNORMAL
SL AMB POCT COLOR,UA: YELLOW
SL AMB POCT KETONES,UA: NEGATIVE
SL AMB POCT NITRITE,UA: NEGATIVE
SL AMB POCT PH,UA: 6
SL AMB POCT SPECIFIC GRAVITY,UA: 1
SL AMB POCT URINE PROTEIN: ABNORMAL
SL AMB POCT UROBILINOGEN: NEGATIVE
SP GR UR STRIP.AUTO: 1.01 (ref 1–1.03)
UROBILINOGEN UR QL STRIP.AUTO: 0.2 E.U./DL
WBC #/AREA URNS AUTO: ABNORMAL /HPF

## 2021-11-29 PROCEDURE — 3080F DIAST BP >= 90 MM HG: CPT | Performed by: FAMILY MEDICINE

## 2021-11-29 PROCEDURE — 81002 URINALYSIS NONAUTO W/O SCOPE: CPT | Performed by: FAMILY MEDICINE

## 2021-11-29 PROCEDURE — 81001 URINALYSIS AUTO W/SCOPE: CPT | Performed by: FAMILY MEDICINE

## 2021-11-29 PROCEDURE — 87086 URINE CULTURE/COLONY COUNT: CPT | Performed by: FAMILY MEDICINE

## 2021-11-29 PROCEDURE — 1036F TOBACCO NON-USER: CPT | Performed by: FAMILY MEDICINE

## 2021-11-29 PROCEDURE — 3077F SYST BP >= 140 MM HG: CPT | Performed by: FAMILY MEDICINE

## 2021-11-29 PROCEDURE — 99214 OFFICE O/P EST MOD 30 MIN: CPT | Performed by: FAMILY MEDICINE

## 2021-11-29 PROCEDURE — 1160F RVW MEDS BY RX/DR IN RCRD: CPT | Performed by: FAMILY MEDICINE

## 2021-11-29 PROCEDURE — 87186 SC STD MICRODIL/AGAR DIL: CPT | Performed by: FAMILY MEDICINE

## 2021-11-29 PROCEDURE — 87077 CULTURE AEROBIC IDENTIFY: CPT | Performed by: FAMILY MEDICINE

## 2021-11-29 RX ORDER — CEPHALEXIN 500 MG/1
500 CAPSULE ORAL EVERY 12 HOURS SCHEDULED
Qty: 14 CAPSULE | Refills: 0 | Status: SHIPPED | OUTPATIENT
Start: 2021-11-29 | End: 2021-12-06

## 2021-12-01 ENCOUNTER — APPOINTMENT (OUTPATIENT)
Dept: LAB | Facility: HOSPITAL | Age: 86
End: 2021-12-01
Payer: COMMERCIAL

## 2021-12-01 DIAGNOSIS — E11.22 TYPE 2 DIABETES MELLITUS WITH STAGE 3A CHRONIC KIDNEY DISEASE, WITH LONG-TERM CURRENT USE OF INSULIN (HCC): ICD-10-CM

## 2021-12-01 DIAGNOSIS — Z79.4 TYPE 2 DIABETES MELLITUS WITH STAGE 3A CHRONIC KIDNEY DISEASE, WITH LONG-TERM CURRENT USE OF INSULIN (HCC): ICD-10-CM

## 2021-12-01 DIAGNOSIS — N39.0 URINARY TRACT INFECTION WITHOUT HEMATURIA, SITE UNSPECIFIED: ICD-10-CM

## 2021-12-01 DIAGNOSIS — I10 BENIGN ESSENTIAL HYPERTENSION: ICD-10-CM

## 2021-12-01 DIAGNOSIS — E83.52 HYPERCALCEMIA: ICD-10-CM

## 2021-12-01 DIAGNOSIS — N18.31 TYPE 2 DIABETES MELLITUS WITH STAGE 3A CHRONIC KIDNEY DISEASE, WITH LONG-TERM CURRENT USE OF INSULIN (HCC): ICD-10-CM

## 2021-12-01 DIAGNOSIS — N18.31 STAGE 3A CHRONIC KIDNEY DISEASE (HCC): ICD-10-CM

## 2021-12-01 LAB
ALBUMIN SERPL BCP-MCNC: 4 G/DL (ref 3–5.2)
ALP SERPL-CCNC: 88 U/L (ref 43–122)
ALT SERPL W P-5'-P-CCNC: 24 U/L
ANION GAP SERPL CALCULATED.3IONS-SCNC: 2 MMOL/L (ref 5–14)
AST SERPL W P-5'-P-CCNC: 28 U/L (ref 14–36)
BACTERIA UR CULT: ABNORMAL
BILIRUB SERPL-MCNC: 0.69 MG/DL
BUN SERPL-MCNC: 26 MG/DL (ref 5–25)
CALCIUM SERPL-MCNC: 10 MG/DL (ref 8.4–10.2)
CHLORIDE SERPL-SCNC: 99 MMOL/L (ref 97–108)
CO2 SERPL-SCNC: 34 MMOL/L (ref 22–30)
CREAT SERPL-MCNC: 0.9 MG/DL (ref 0.6–1.2)
CREAT UR-MCNC: 43.4 MG/DL
EST. AVERAGE GLUCOSE BLD GHB EST-MCNC: 160 MG/DL
GFR SERPL CREATININE-BSD FRML MDRD: 59 ML/MIN/1.73SQ M
GLUCOSE P FAST SERPL-MCNC: 141 MG/DL (ref 70–99)
HBA1C MFR BLD: 7.2 %
MICROALBUMIN UR-MCNC: 9.5 MG/L (ref 0–20)
MICROALBUMIN/CREAT 24H UR: 22 MG/G CREATININE (ref 0–30)
POTASSIUM SERPL-SCNC: 4.1 MMOL/L (ref 3.6–5)
PROT SERPL-MCNC: 7.8 G/DL (ref 5.9–8.4)
SODIUM SERPL-SCNC: 135 MMOL/L (ref 137–147)

## 2021-12-01 PROCEDURE — 88112 CYTOPATH CELL ENHANCE TECH: CPT | Performed by: PATHOLOGY

## 2021-12-01 PROCEDURE — 36415 COLL VENOUS BLD VENIPUNCTURE: CPT

## 2021-12-01 PROCEDURE — 82043 UR ALBUMIN QUANTITATIVE: CPT | Performed by: FAMILY MEDICINE

## 2021-12-01 PROCEDURE — 82570 ASSAY OF URINE CREATININE: CPT | Performed by: FAMILY MEDICINE

## 2021-12-01 PROCEDURE — 83036 HEMOGLOBIN GLYCOSYLATED A1C: CPT

## 2021-12-01 PROCEDURE — 80053 COMPREHEN METABOLIC PANEL: CPT

## 2021-12-09 ENCOUNTER — OFFICE VISIT (OUTPATIENT)
Dept: FAMILY MEDICINE CLINIC | Facility: CLINIC | Age: 86
End: 2021-12-09
Payer: COMMERCIAL

## 2021-12-09 VITALS
WEIGHT: 146.2 LBS | TEMPERATURE: 97 F | DIASTOLIC BLOOD PRESSURE: 76 MMHG | RESPIRATION RATE: 17 BRPM | OXYGEN SATURATION: 96 % | HEART RATE: 83 BPM | BODY MASS INDEX: 25.91 KG/M2 | SYSTOLIC BLOOD PRESSURE: 122 MMHG | HEIGHT: 63 IN

## 2021-12-09 DIAGNOSIS — E78.2 MIXED HYPERLIPIDEMIA: ICD-10-CM

## 2021-12-09 DIAGNOSIS — N39.0 URINARY TRACT INFECTION WITHOUT HEMATURIA, SITE UNSPECIFIED: ICD-10-CM

## 2021-12-09 DIAGNOSIS — Z79.4 TYPE 2 DIABETES MELLITUS WITH HYPERGLYCEMIA, WITH LONG-TERM CURRENT USE OF INSULIN (HCC): ICD-10-CM

## 2021-12-09 DIAGNOSIS — Z79.4 TYPE 2 DIABETES MELLITUS WITH STAGE 3A CHRONIC KIDNEY DISEASE, WITH LONG-TERM CURRENT USE OF INSULIN (HCC): ICD-10-CM

## 2021-12-09 DIAGNOSIS — I10 BENIGN ESSENTIAL HYPERTENSION: ICD-10-CM

## 2021-12-09 DIAGNOSIS — K21.9 GERD WITHOUT ESOPHAGITIS: Primary | ICD-10-CM

## 2021-12-09 DIAGNOSIS — E55.9 VITAMIN D DEFICIENCY: ICD-10-CM

## 2021-12-09 DIAGNOSIS — E11.22 TYPE 2 DIABETES MELLITUS WITH STAGE 3A CHRONIC KIDNEY DISEASE, WITH LONG-TERM CURRENT USE OF INSULIN (HCC): ICD-10-CM

## 2021-12-09 DIAGNOSIS — M85.89 OSTEOPENIA OF MULTIPLE SITES: ICD-10-CM

## 2021-12-09 DIAGNOSIS — E11.65 TYPE 2 DIABETES MELLITUS WITH HYPERGLYCEMIA, WITH LONG-TERM CURRENT USE OF INSULIN (HCC): ICD-10-CM

## 2021-12-09 DIAGNOSIS — I72.8 SPLENIC ARTERY ANEURYSM (HCC): ICD-10-CM

## 2021-12-09 DIAGNOSIS — F43.22 ADJUSTMENT DISORDER WITH ANXIOUS MOOD: ICD-10-CM

## 2021-12-09 DIAGNOSIS — N18.31 STAGE 3A CHRONIC KIDNEY DISEASE (HCC): ICD-10-CM

## 2021-12-09 DIAGNOSIS — I48.0 PAROXYSMAL ATRIAL FIBRILLATION (HCC): ICD-10-CM

## 2021-12-09 DIAGNOSIS — N18.31 TYPE 2 DIABETES MELLITUS WITH STAGE 3A CHRONIC KIDNEY DISEASE, WITH LONG-TERM CURRENT USE OF INSULIN (HCC): ICD-10-CM

## 2021-12-09 PROBLEM — R00.2 PALPITATIONS: Status: RESOLVED | Noted: 2020-10-27 | Resolved: 2021-12-09

## 2021-12-09 PROCEDURE — 1160F RVW MEDS BY RX/DR IN RCRD: CPT | Performed by: FAMILY MEDICINE

## 2021-12-09 PROCEDURE — 1036F TOBACCO NON-USER: CPT | Performed by: FAMILY MEDICINE

## 2021-12-09 PROCEDURE — 99214 OFFICE O/P EST MOD 30 MIN: CPT | Performed by: FAMILY MEDICINE

## 2021-12-09 PROCEDURE — 3078F DIAST BP <80 MM HG: CPT | Performed by: FAMILY MEDICINE

## 2021-12-09 PROCEDURE — 3074F SYST BP LT 130 MM HG: CPT | Performed by: FAMILY MEDICINE

## 2021-12-09 RX ORDER — INSULIN DETEMIR 100 [IU]/ML
10 INJECTION, SOLUTION SUBCUTANEOUS
Qty: 15 ML | Refills: 3
Start: 2021-12-09 | End: 2022-05-31 | Stop reason: SDUPTHER

## 2021-12-09 RX ORDER — NITROFURANTOIN 25; 75 MG/1; MG/1
100 CAPSULE ORAL 2 TIMES DAILY
Qty: 10 CAPSULE | Refills: 0 | Status: SHIPPED | OUTPATIENT
Start: 2021-12-09 | End: 2021-12-14

## 2022-01-03 ENCOUNTER — VBI (OUTPATIENT)
Dept: ADMINISTRATIVE | Facility: OTHER | Age: 87
End: 2022-01-03

## 2022-01-07 DIAGNOSIS — E78.49 OTHER HYPERLIPIDEMIA: ICD-10-CM

## 2022-01-07 RX ORDER — SIMVASTATIN 20 MG
20 TABLET ORAL DAILY
Qty: 90 TABLET | Refills: 3 | Status: SHIPPED | OUTPATIENT
Start: 2022-01-07

## 2022-01-24 ENCOUNTER — TELEPHONE (OUTPATIENT)
Dept: FAMILY MEDICINE CLINIC | Facility: CLINIC | Age: 87
End: 2022-01-24

## 2022-01-24 ENCOUNTER — OFFICE VISIT (OUTPATIENT)
Dept: FAMILY MEDICINE CLINIC | Facility: CLINIC | Age: 87
End: 2022-01-24
Payer: COMMERCIAL

## 2022-01-24 VITALS
SYSTOLIC BLOOD PRESSURE: 146 MMHG | BODY MASS INDEX: 25.98 KG/M2 | WEIGHT: 146.6 LBS | HEIGHT: 63 IN | OXYGEN SATURATION: 97 % | HEART RATE: 87 BPM | DIASTOLIC BLOOD PRESSURE: 88 MMHG | RESPIRATION RATE: 18 BRPM | TEMPERATURE: 97.3 F

## 2022-01-24 DIAGNOSIS — R30.0 DYSURIA: Primary | ICD-10-CM

## 2022-01-24 LAB
BACTERIA UR QL AUTO: ABNORMAL /HPF
BILIRUB UR QL STRIP: NEGATIVE
CLARITY UR: ABNORMAL
COLOR UR: YELLOW
GLUCOSE UR STRIP-MCNC: ABNORMAL MG/DL
HGB UR QL STRIP.AUTO: ABNORMAL
KETONES UR STRIP-MCNC: NEGATIVE MG/DL
LEUKOCYTE ESTERASE UR QL STRIP: ABNORMAL
NITRITE UR QL STRIP: NEGATIVE
NON-SQ EPI CELLS URNS QL MICRO: ABNORMAL /HPF
PH UR STRIP.AUTO: 6 [PH]
PROT UR STRIP-MCNC: NEGATIVE MG/DL
RBC #/AREA URNS AUTO: ABNORMAL /HPF
SL AMB  POCT GLUCOSE, UA: POSITIVE
SL AMB LEUKOCYTE ESTERASE,UA: ABNORMAL
SL AMB POCT BILIRUBIN,UA: NEGATIVE
SL AMB POCT BLOOD,UA: ABNORMAL
SL AMB POCT CLARITY,UA: ABNORMAL
SL AMB POCT COLOR,UA: YELLOW
SL AMB POCT KETONES,UA: NEGATIVE
SL AMB POCT NITRITE,UA: NEGATIVE
SL AMB POCT PH,UA: 5
SL AMB POCT SPECIFIC GRAVITY,UA: 1.05
SL AMB POCT URINE PROTEIN: ABNORMAL
SL AMB POCT UROBILINOGEN: ABNORMAL
SP GR UR STRIP.AUTO: 1.02 (ref 1–1.03)
UROBILINOGEN UR QL STRIP.AUTO: 0.2 E.U./DL
WBC #/AREA URNS AUTO: ABNORMAL /HPF
WBC CLUMPS # UR AUTO: YES /UL

## 2022-01-24 PROCEDURE — 81002 URINALYSIS NONAUTO W/O SCOPE: CPT | Performed by: NURSE PRACTITIONER

## 2022-01-24 PROCEDURE — 1036F TOBACCO NON-USER: CPT | Performed by: NURSE PRACTITIONER

## 2022-01-24 PROCEDURE — 87186 SC STD MICRODIL/AGAR DIL: CPT | Performed by: NURSE PRACTITIONER

## 2022-01-24 PROCEDURE — 87086 URINE CULTURE/COLONY COUNT: CPT | Performed by: NURSE PRACTITIONER

## 2022-01-24 PROCEDURE — 3077F SYST BP >= 140 MM HG: CPT | Performed by: NURSE PRACTITIONER

## 2022-01-24 PROCEDURE — 99214 OFFICE O/P EST MOD 30 MIN: CPT | Performed by: NURSE PRACTITIONER

## 2022-01-24 PROCEDURE — 1160F RVW MEDS BY RX/DR IN RCRD: CPT | Performed by: NURSE PRACTITIONER

## 2022-01-24 PROCEDURE — 81001 URINALYSIS AUTO W/SCOPE: CPT | Performed by: NURSE PRACTITIONER

## 2022-01-24 PROCEDURE — 87077 CULTURE AEROBIC IDENTIFY: CPT | Performed by: NURSE PRACTITIONER

## 2022-01-24 PROCEDURE — 3079F DIAST BP 80-89 MM HG: CPT | Performed by: NURSE PRACTITIONER

## 2022-01-24 RX ORDER — SULFAMETHOXAZOLE AND TRIMETHOPRIM 800; 160 MG/1; MG/1
1 TABLET ORAL 2 TIMES DAILY
Qty: 6 TABLET | Refills: 0 | Status: SHIPPED | OUTPATIENT
Start: 2022-01-24 | End: 2022-01-27

## 2022-01-24 NOTE — PROGRESS NOTES
Subjective:   Chief Complaint   Patient presents with    possible uti    Urinary Symptoms        Patient ID: Nat Elliott is a 82 y o  female  Patient presents today for symptoms of increased frequency, urgency, and painful urination  Denies pelvic pain, flank pain, or fever  This episode has been occurring for about a week  She's has an extensive history of UTI's with having 4 last year and states it has been getting worse  She has an appointment with urogyn next week concerning these continued issues  POCT Urine sample showed moderate blood and leukocytes present  Urine sent out for culture  Instructed to increase fluid intake      The following portions of the patient's history were reviewed and updated as appropriate: allergies, current medications, past family history, past medical history, past social history, past surgical history and problem list     Review of Systems   Constitutional: Negative for chills, fatigue and fever  Respiratory: Negative for cough and shortness of breath  Cardiovascular: Negative for chest pain and leg swelling  Gastrointestinal: Negative for abdominal distention, abdominal pain, constipation, diarrhea, nausea and vomiting  Genitourinary: Positive for difficulty urinating, dysuria, frequency and urgency  Negative for decreased urine volume, flank pain and hematuria  Neurological: Negative for dizziness and headaches  Psychiatric/Behavioral: Negative for dysphoric mood  The patient is not nervous/anxious  Objective:  Vitals:    01/24/22 1356   BP: 146/88   BP Location: Left arm   Patient Position: Sitting   Cuff Size: Standard   Pulse: 87   Resp: 18   Temp: (!) 97 3 °F (36 3 °C)   TempSrc: Tympanic   SpO2: 97%   Weight: 66 5 kg (146 lb 9 6 oz)   Height: 5' 2 5" (1 588 m)      Physical Exam  Vitals reviewed  Constitutional:       Appearance: Normal appearance  Cardiovascular:      Rate and Rhythm: Normal rate and regular rhythm        Pulses: Normal pulses  Heart sounds: Normal heart sounds  Pulmonary:      Effort: Pulmonary effort is normal       Breath sounds: Normal breath sounds  Abdominal:      General: Bowel sounds are normal  There is no distension  Palpations: Abdomen is soft  There is no mass  Tenderness: There is no abdominal tenderness  There is no right CVA tenderness, left CVA tenderness, guarding or rebound  Hernia: No hernia is present  Skin:     General: Skin is warm and dry  Capillary Refill: Capillary refill takes less than 2 seconds  Neurological:      Mental Status: She is alert and oriented to person, place, and time  Psychiatric:         Mood and Affect: Mood normal          Behavior: Behavior normal            Assessment/Plan:    No problem-specific Assessment & Plan notes found for this encounter  Diagnoses and all orders for this visit:    Dysuria  -     POCT urine dip  -     UA/M w/rflx Culture, Routine  -     sulfamethoxazole-trimethoprim (BACTRIM DS) 800-160 mg per tablet;  Take 1 tablet by mouth 2 (two) times a day for 3 days

## 2022-01-27 LAB — BACTERIA UR CULT: ABNORMAL

## 2022-02-18 ENCOUNTER — OFFICE VISIT (OUTPATIENT)
Dept: CARDIOLOGY CLINIC | Facility: CLINIC | Age: 87
End: 2022-02-18
Payer: COMMERCIAL

## 2022-02-18 VITALS
SYSTOLIC BLOOD PRESSURE: 126 MMHG | HEART RATE: 84 BPM | RESPIRATION RATE: 16 BRPM | WEIGHT: 147.6 LBS | HEIGHT: 63 IN | BODY MASS INDEX: 26.15 KG/M2 | DIASTOLIC BLOOD PRESSURE: 80 MMHG

## 2022-02-18 DIAGNOSIS — I72.8 SPLENIC ARTERY ANEURYSM (HCC): ICD-10-CM

## 2022-02-18 DIAGNOSIS — Z79.4 TYPE 2 DIABETES MELLITUS WITH STAGE 3A CHRONIC KIDNEY DISEASE, WITH LONG-TERM CURRENT USE OF INSULIN (HCC): ICD-10-CM

## 2022-02-18 DIAGNOSIS — E11.65 TYPE 2 DIABETES MELLITUS WITH HYPERGLYCEMIA, WITH LONG-TERM CURRENT USE OF INSULIN (HCC): ICD-10-CM

## 2022-02-18 DIAGNOSIS — I10 BENIGN ESSENTIAL HYPERTENSION: ICD-10-CM

## 2022-02-18 DIAGNOSIS — Z79.4 TYPE 2 DIABETES MELLITUS WITH HYPERGLYCEMIA, WITH LONG-TERM CURRENT USE OF INSULIN (HCC): ICD-10-CM

## 2022-02-18 DIAGNOSIS — I49.3 VENTRICULAR PREMATURE DEPOLARIZATION: ICD-10-CM

## 2022-02-18 DIAGNOSIS — E78.2 MIXED HYPERLIPIDEMIA: ICD-10-CM

## 2022-02-18 DIAGNOSIS — E11.22 TYPE 2 DIABETES MELLITUS WITH STAGE 3A CHRONIC KIDNEY DISEASE, WITH LONG-TERM CURRENT USE OF INSULIN (HCC): ICD-10-CM

## 2022-02-18 DIAGNOSIS — N18.31 STAGE 3A CHRONIC KIDNEY DISEASE (HCC): ICD-10-CM

## 2022-02-18 DIAGNOSIS — N18.31 TYPE 2 DIABETES MELLITUS WITH STAGE 3A CHRONIC KIDNEY DISEASE, WITH LONG-TERM CURRENT USE OF INSULIN (HCC): ICD-10-CM

## 2022-02-18 DIAGNOSIS — I48.0 PAROXYSMAL ATRIAL FIBRILLATION (HCC): Primary | ICD-10-CM

## 2022-02-18 PROCEDURE — 1036F TOBACCO NON-USER: CPT | Performed by: INTERNAL MEDICINE

## 2022-02-18 PROCEDURE — 3079F DIAST BP 80-89 MM HG: CPT | Performed by: INTERNAL MEDICINE

## 2022-02-18 PROCEDURE — 99214 OFFICE O/P EST MOD 30 MIN: CPT | Performed by: INTERNAL MEDICINE

## 2022-02-18 PROCEDURE — 1160F RVW MEDS BY RX/DR IN RCRD: CPT | Performed by: INTERNAL MEDICINE

## 2022-02-18 PROCEDURE — 3074F SYST BP LT 130 MM HG: CPT | Performed by: INTERNAL MEDICINE

## 2022-02-18 RX ORDER — ESTRADIOL 0.1 MG/G
2 CREAM VAGINAL DAILY
COMMUNITY

## 2022-02-18 NOTE — PROGRESS NOTES
[facility]  [id number] [facility]   [address]  [id number]  Phone#  [phone]   Fax#  [phone]  *-*-*-*-*-*-*-*-*-*-*-*-*-*-*-*-*-*-*-*-*-*-*-*-*-*-*-*-*-*-*-*-*-*-*-*-*-*-*-*-*-*-*-*-*-*-*-*-*-*-*-*-*-*                                   Cardiology Follow Up      ENCOUNTER DATE: 22 4:03 PM  PATIENT NAME: Nat Elliott   : 1935    MRN: [id number]  AGE:86 y o  SEX: female  [address] Jose[address] Carpenter MD     PRIMARY CARE PHYSICIAN: Narinder Tinoco DO    ACTIVE DIAGNOSIS THIS VISIT  1  Paroxysmal atrial fibrillation (HCC)     2  Ventricular premature depolarization     3  Mixed hyperlipidemia     4  Benign essential hypertension     5  Type 2 diabetes mellitus with stage 3a chronic kidney disease, with long-term current use of insulin (HCC)     6  Splenic artery aneurysm (HCC)     7  Stage 3a chronic kidney disease ([address] )     8   Type 2 diabetes mellitus with hyperglycemia, with long-term current use of insulin ([address] )       ACTIVE PROBLEM LIST  Patient Active Problem List   Diagnosis    Benign essential hypertension    Prolapse of anterior vaginal wall    Fibrocystic breast disease    Hypercalcemia    Incontinence    Kyphosis    Microproteinuria    Ventricular premature depolarization    Tinnitus    Paroxysmal atrial fibrillation (HCC)    Mixed hyperlipidemia    Type 2 diabetes mellitus with stage 3a chronic kidney disease, with long-term current use of insulin (HCC)    Splenic artery aneurysm (HCC)    Thyroid nodule    Vitamin D deficiency    BMI 28 0-28 9,adult    Osteopenia of multiple sites    Stage 3a chronic kidney disease ([address] )    Dysequilibrium    Anxiety    Hypoalbuminemia    Adjustment disorder with anxious mood    Clifton-Walker grade 2 cystocele    Type 2 diabetes mellitus with hyperglycemia (HCC)    GERD without esophagitis    Urinary urgency    Urinary tract infection without hematuria       CARDIOLOGY SPECIALTY COMMENTS  Patient was 1st seen by us for new onset atrial fibrillation with RVR postop parathyroidectomy by Dr Adán Shearer  She is a 2nd admission for the same and at that time had a type 2 non ST elevation myocardial infarction  CT scan PE study was negative  Past medical history includes hypertension, hyperlipidemia and diabetes mellitus  05/29/2018 stress test: Negative Persantine stress test  LVEF 77%, hyperdynamic ventricle  Normal tomographic perfusion series  05/23/2018 echocardiogram: Normal left ventricular systolic function, EF 92-31%  Intermediate left ventricular diastolic function  Normal left ventricular filling pressures  Mild-to-moderate left atrial enlargement  Aortic sclerosis without stenosis  INTERVAL HISTORY:         patient with history of atrial fibrillation on Xarelto returns  Patient has a brief fluttering feeling in her chest approximately once a week which is of very short does ratio in and not associated with other symptoms  She is active and has no significant limitations for her age  Her blood pressure is good  Her lipid profile is good  DISCUSSION/PLAN:              1 Return in 6 months    2  EKG on return    Lab Studies:    Lab Results   Component Value Date    CHOLESTEROL 143 07/28/2021    CHOLESTEROL 134 07/31/2020    CHOLESTEROL 152 12/30/2019     Lab Results   Component Value Date    TRIG 53 07/28/2021    TRIG 46 07/31/2020    TRIG 50 12/30/2019     Lab Results   Component Value Date    HDL 58 07/28/2021    HDL 61 07/31/2020    HDL 63 12/30/2019     Lab Results   Component Value Date    LDLCALC 74 07/28/2021    LDLCALC 64 07/31/2020    LDLCALC 79 12/30/2019     Lab Results   Component Value Date    NONHDL 93 06/18/2019    NONHDL 78 12/03/2018    NONHDL 95 02/01/2018     Lab Results   Component Value Date    HGBA1C 7 2 (H) 12/01/2021      Lab Results   Component Value Date    EGFR 59 12/01/2021    EGFR 70 07/28/2021    EGFR 61 04/09/2021    SODIUM 135 (L) 12/01/2021    SODIUM 135 (L) 07/28/2021    SODIUM 135 (L) 04/09/2021    K 4 1 12/01/2021    K 3 9 07/28/2021    K 4 0 04/09/2021    CL 99 12/01/2021     07/28/2021     04/09/2021    CO2 34 (H) 12/01/2021    CO2 30 07/28/2021    CO2 31 04/09/2021    ANIONGAP 7 05/29/2018    ANIONGAP 12 05/28/2018    ANIONGAP 8 05/24/2018    BUN 26 (H) 12/01/2021    BUN 19 07/28/2021    BUN 15 04/09/2021    CREATININE 0 90 12/01/2021    CREATININE 0 78 07/28/2021    CREATININE 0 88 04/09/2021     Lab Results   Component Value Date    WBC 10 90 (H) 01/16/2019    WBC 9 2 05/29/2018    WBC 9 3 05/28/2018    HGB 13 6 01/16/2019    HGB 12 8 05/29/2018    HGB 13 1 05/28/2018    HCT 42 6 01/16/2019    HCT 38 4 05/29/2018    HCT 38 3 05/28/2018    MCV 93 01/16/2019    MCV 90 05/29/2018    MCV 91 05/28/2018    MCH 29 8 01/16/2019    MCH 30 1 05/29/2018    MCH 31 0 05/28/2018    MCHC 31 9 01/16/2019    MCHC 33 5 05/29/2018    MCHC 34 2 05/28/2018     01/16/2019     05/29/2018     05/28/2018      Lab Results   Component Value Date    GLUCOSE 104 (H) 05/29/2018    GLUCOSE 281 (H) 05/28/2018    GLUCOSE 242 (H) 05/23/2018    CALCIUM 10 0 12/01/2021    CALCIUM 10 2 (H) 07/28/2021    CALCIUM 10 1 04/09/2021    AST 28 12/01/2021    AST 21 07/28/2021    AST 19 04/09/2021    ALT 24 12/01/2021    ALT 30 07/28/2021    ALT 24 04/09/2021    ALKPHOS 88 12/01/2021    ALKPHOS 95 07/28/2021    ALKPHOS 100 04/09/2021    PROT 7 1 05/28/2018    BILITOT 0 4 05/28/2018    MG 2 3 01/16/2019    MG 2 0 05/28/2018       Lab Results   Component Value Date    TROPONINI 0 07 (H) 05/29/2018    TROPONINI 0 08 (H) 05/28/2018    TROPONINI 0 12 (H) 05/28/2018     No results found for this visit on 02/18/22        Current Outpatient Medications:     atenolol (TENORMIN) 50 mg tablet, Take 1 tablet (50 mg total) by mouth 2 (two) times a day, Disp: 180 tablet, Rfl: 3    BD Pen Needle Glory U/F 32G X 4 MM MISC, TEST DAILY AS DIRECTED, Disp: 100 each, Rfl: 3    Blood Glucose Monitoring Suppl (Accu-Chek Guide Me) w/Device KIT, USE DAILY AS DIRECTED, Disp: 1 kit, Rfl: 0    diltiazem (CARDIZEM CD) 120 mg 24 hr capsule, TAKE 1 CAPSULE BY MOUTH EVERY DAY, Disp: 90 capsule, Rfl: 3    Dulaglutide (Trulicity) 1 58 UY/6 0KY SOPN, Inject 0 5 mL (0 75 mg total) under the skin once a week, Disp: 5 mL, Rfl: 5    estradiol (ESTRACE) 0 1 mg/g vaginal cream, Insert 2 g into the vagina daily, Disp: , Rfl:     glucose blood (Accu-Chek Yelitza Plus) test strip, Use 1 each daily Use as instructed, Disp: 100 strip, Rfl: 3    hydrochlorothiazide (HYDRODIURIL) 12 5 mg tablet, Take 1 tablet (12 5 mg total) by mouth daily, Disp: 90 tablet, Rfl: 3    hydrOXYzine HCL (ATARAX) 25 mg tablet, TAKE 1 TABLET TWICE DAILY AS NEEDED FOR ANXIETY, Disp: 60 tablet, Rfl: 3    insulin detemir (Levemir FlexTouch) 100 Units/mL injection pen, Inject 10 Units under the skin daily at bedtime, Disp: 15 mL, Rfl: 3    Lancets (accu-chek multiclix) lancets, Use daily Use as instructed, Disp: 100 each, Rfl: 3    lisinopril (ZESTRIL) 20 mg tablet, Take 1 tablet (20 mg total) by mouth daily, Disp: 90 tablet, Rfl: 3    MICROLET LANCETS MISC, by Does not apply route daily, Disp: 50 each, Rfl: 3    simvastatin (ZOCOR) 20 mg tablet, Take 1 tablet (20 mg total) by mouth daily, Disp: 90 tablet, Rfl: 3    Trulicity 0 56 SM/7 7UF SOPN, INJECT 0 5 ML (0 75 MG TOTAL) UNDER THE SKIN ONCE A WEEK, Disp: 5 mL, Rfl: 5    Vitamin D, Cholecalciferol, 50 MCG (2000 UT) CAPS, Take 1 capsule by mouth daily, Disp: , Rfl:     Xarelto 20 MG tablet, TAKE 1 TABLET (20 MG TOTAL) BY MOUTH EVERY 24 HOURS, Disp: 90 tablet, Rfl: 3    ergocalciferol (ERGOCALCIFEROL) 1 25 MG (17102 UT) capsule, Take 50,000 Units by mouth (Patient not taking: Reported on 10/27/2021), Disp: , Rfl:   Allergies   Allergen Reactions    No Active Allergies        Past Medical History:   Diagnosis Date    Anemia     IRON DEF ANEMIA DUE TO MENORRHAGIA    Diabetes mellitus ([facility] )  Hypercalcemia 10/06/2010    MILD  DECREASES HCTZ    Hypertension     Melanoma ([address] ) 11/2011    IN SITU  MID BACK    Palpitations 05/28/2018    SINUS TACHYCARDIA  TROPONIN SPLL    Skin cancer 2017    NOSE     Social History     Socioeconomic History    Marital status: /Civil Union     Spouse name: Not on file    Number of children: Not on file    Years of education: Not on file    Highest education level: Not on file   Occupational History    Not on file   Tobacco Use    Smoking status: Never Smoker    Smokeless tobacco: Never Used   Vaping Use    Vaping Use: Never used   Substance and Sexual Activity    Alcohol use: No    Drug use: No    Sexual activity: Never   Other Topics Concern    Not on file   Social History Narrative    Not on file     Social Determinants of Health     Financial Resource Strain: Not on file   Food Insecurity: Not on file   Transportation Needs: Not on file   Physical Activity: Not on file   Stress: Not on file   Social Connections: Not on file   Intimate Partner Violence: Not on file   Housing Stability: Not on file      Family History   Problem Relation Age of Onset    Other Mother         TESTED(-)    Colon cancer Father 61    Prostate cancer Father 79    Factor V Leiden deficiency Daughter     Factor V Leiden deficiency Daughter     Breast cancer Sister         in her 63's    No Known Problems Maternal Grandmother     No Known Problems Maternal Grandfather     No Known Problems Paternal Grandmother     No Known Problems Paternal Grandfather     No Known Problems Sister     No Known Problems Sister     No Known Problems Maternal Aunt     No Known Problems Maternal Aunt     No Known Problems Maternal Aunt     No Known Problems Maternal Aunt     No Known Problems Maternal Aunt     No Known Problems Maternal Aunt     No Known Problems Maternal Aunt     No Known Problems Paternal Aunt     No Known Problems Paternal Aunt     No Known Problems Paternal Aunt      Past Surgical History:   Procedure Laterality Date    GALLBLADDER SURGERY  1991    HYSTERECTOMY  1995    UTERINE PROLAPSE VAGINAL POLYPS    HYSTERECTOMY      IRON REPLACEMENT    OTHER SURGICAL HISTORY  11/2011    EXCISION MELANOMA IN SITU MID BACK    PARATHYROIDECTOMY  05/21/2018       PREVIOUS WEIGHTS:   Wt Readings from Last 10 Encounters:   02/18/22 67 kg (147 lb 9 6 oz)   01/24/22 66 5 kg (146 lb 9 6 oz)   12/09/21 66 3 kg (146 lb 3 2 oz)   11/29/21 66 kg (145 lb 9 6 oz)   10/27/21 67 1 kg (148 lb)   09/07/21 66 5 kg (146 lb 9 6 oz)   05/17/21 65 3 kg (144 lb)   04/14/21 65 9 kg (145 lb 3 2 oz)   02/16/21 67 3 kg (148 lb 6 4 oz)   02/10/21 67 3 kg (148 lb 6 4 oz)        Review of Systems:  Review of Systems   Respiratory: Negative for cough, choking, chest tightness, shortness of breath and wheezing  Cardiovascular: Negative for chest pain, palpitations and leg swelling  Musculoskeletal: Negative for gait problem  Skin: Negative for rash  Neurological: Negative for dizziness, tremors, syncope, weakness, light-headedness, numbness and headaches  Psychiatric/Behavioral: Negative for agitation and behavioral problems  The patient is not hyperactive  Physical Exam:  /80   Pulse 84   Resp 16   Ht 5' 2 5" (1 588 m)   Wt 67 kg (147 lb 9 6 oz)   BMI 26 57 kg/m²     Physical Exam  Constitutional:       General: She is not in acute distress  Appearance: She is well-developed  HENT:      Head: Normocephalic and atraumatic  Neck:      Thyroid: No thyromegaly  Vascular: No carotid bruit or JVD  Trachea: No tracheal deviation  Cardiovascular:      Rate and Rhythm: Normal rate and regular rhythm  Pulses: Normal pulses  Heart sounds: Normal heart sounds  No murmur heard  No friction rub  No gallop  Pulmonary:      Effort: Pulmonary effort is normal  No respiratory distress  Breath sounds: Normal breath sounds   No wheezing, rhonchi or rales    Chest:      Chest wall: No tenderness  Musculoskeletal:         General: Normal range of motion  Cervical back: Normal range of motion and neck supple  Right lower leg: No edema  Left lower leg: No edema  Skin:     General: Skin is warm and dry  Neurological:      General: No focal deficit present  Mental Status: She is alert and oriented to person, place, and time  Gait: Gait normal    Psychiatric:         Mood and Affect: Mood normal          Behavior: Behavior normal          Thought Content: Thought content normal          Judgment: Judgment normal        ======================================================  Imaging:   I have personally reviewed pertinent reports  Portions of the record may have been created with voice recognition software  Occasional wrong word or "sound a like" substitutions may have occurred due to the inherent limitations of voice recognition software  Read the chart carefully and recognize, using context, where substitutions have occurred      SIGNATURES:   Odalys George MD

## 2022-03-03 ENCOUNTER — RA CDI HCC (OUTPATIENT)
Dept: OTHER | Facility: HOSPITAL | Age: 87
End: 2022-03-03

## 2022-03-03 NOTE — PROGRESS NOTES
[address]  coding opportunities      Chart Reviewed * (Number of) Inbasket suggestions sent to Provider: 1           Patients insurance company: [employer] (Medicare Advantage and Commercial)        Appt on 3/10/22    Found in active problem list - please review for 2022 E11 22: Type 2 diabetes mellitus with stage 3a chronic kidney disease, with long-term current use of insulin ([address] )

## 2022-03-08 ENCOUNTER — APPOINTMENT (OUTPATIENT)
Dept: LAB | Facility: HOSPITAL | Age: 87
End: 2022-03-08
Payer: COMMERCIAL

## 2022-03-08 DIAGNOSIS — Z79.4 TYPE 2 DIABETES MELLITUS WITH HYPERGLYCEMIA, WITH LONG-TERM CURRENT USE OF INSULIN (HCC): ICD-10-CM

## 2022-03-08 DIAGNOSIS — Z79.4 TYPE 2 DIABETES MELLITUS WITH STAGE 3A CHRONIC KIDNEY DISEASE, WITH LONG-TERM CURRENT USE OF INSULIN (HCC): Primary | ICD-10-CM

## 2022-03-08 DIAGNOSIS — N18.31 TYPE 2 DIABETES MELLITUS WITH STAGE 3A CHRONIC KIDNEY DISEASE, WITH LONG-TERM CURRENT USE OF INSULIN (HCC): Primary | ICD-10-CM

## 2022-03-08 DIAGNOSIS — E11.65 TYPE 2 DIABETES MELLITUS WITH HYPERGLYCEMIA, WITH LONG-TERM CURRENT USE OF INSULIN (HCC): ICD-10-CM

## 2022-03-08 DIAGNOSIS — N18.31 STAGE 3A CHRONIC KIDNEY DISEASE (HCC): ICD-10-CM

## 2022-03-08 DIAGNOSIS — E11.22 TYPE 2 DIABETES MELLITUS WITH STAGE 3A CHRONIC KIDNEY DISEASE, WITH LONG-TERM CURRENT USE OF INSULIN (HCC): Primary | ICD-10-CM

## 2022-03-08 DIAGNOSIS — E78.2 MIXED HYPERLIPIDEMIA: ICD-10-CM

## 2022-03-08 LAB
ALBUMIN SERPL BCP-MCNC: 4 G/DL (ref 3–5.2)
ALP SERPL-CCNC: 97 U/L (ref 43–122)
ALT SERPL W P-5'-P-CCNC: 23 U/L
ANION GAP SERPL CALCULATED.3IONS-SCNC: 6 MMOL/L (ref 5–14)
AST SERPL W P-5'-P-CCNC: 31 U/L (ref 14–36)
BILIRUB SERPL-MCNC: 0.74 MG/DL
BILIRUB UR QL STRIP: NEGATIVE
BUN SERPL-MCNC: 20 MG/DL (ref 5–25)
CALCIUM SERPL-MCNC: 9.7 MG/DL (ref 8.4–10.2)
CHLORIDE SERPL-SCNC: 101 MMOL/L (ref 97–108)
CHOLEST SERPL-MCNC: 139 MG/DL
CLARITY UR: CLEAR
CO2 SERPL-SCNC: 30 MMOL/L (ref 22–30)
COLOR UR: NORMAL
CREAT SERPL-MCNC: 0.88 MG/DL (ref 0.6–1.2)
EST. AVERAGE GLUCOSE BLD GHB EST-MCNC: 154 MG/DL
GFR SERPL CREATININE-BSD FRML MDRD: 60 ML/MIN/1.73SQ M
GLUCOSE P FAST SERPL-MCNC: 161 MG/DL (ref 70–99)
GLUCOSE UR STRIP-MCNC: NEGATIVE MG/DL
HBA1C MFR BLD: 7 %
HDLC SERPL-MCNC: 57 MG/DL
HGB UR QL STRIP.AUTO: NEGATIVE
KETONES UR STRIP-MCNC: NEGATIVE MG/DL
LDLC SERPL CALC-MCNC: 70 MG/DL
LEUKOCYTE ESTERASE UR QL STRIP: NEGATIVE
NITRITE UR QL STRIP: NEGATIVE
NONHDLC SERPL-MCNC: 82 MG/DL
PH UR STRIP.AUTO: 7 [PH]
POTASSIUM SERPL-SCNC: 3.8 MMOL/L (ref 3.6–5)
PROT SERPL-MCNC: 7.8 G/DL (ref 5.9–8.4)
PROT UR STRIP-MCNC: NEGATIVE MG/DL
SODIUM SERPL-SCNC: 137 MMOL/L (ref 137–147)
SP GR UR STRIP.AUTO: 1.01 (ref 1–1.04)
TRIGL SERPL-MCNC: 59 MG/DL
UROBILINOGEN UA: NEGATIVE MG/DL

## 2022-03-08 PROCEDURE — 81003 URINALYSIS AUTO W/O SCOPE: CPT

## 2022-03-08 PROCEDURE — 36415 COLL VENOUS BLD VENIPUNCTURE: CPT

## 2022-03-08 PROCEDURE — 80061 LIPID PANEL: CPT

## 2022-03-08 PROCEDURE — 80053 COMPREHEN METABOLIC PANEL: CPT

## 2022-03-08 PROCEDURE — 83036 HEMOGLOBIN GLYCOSYLATED A1C: CPT

## 2022-03-10 ENCOUNTER — OFFICE VISIT (OUTPATIENT)
Dept: FAMILY MEDICINE CLINIC | Facility: CLINIC | Age: 87
End: 2022-03-10
Payer: COMMERCIAL

## 2022-03-10 VITALS
OXYGEN SATURATION: 95 % | SYSTOLIC BLOOD PRESSURE: 122 MMHG | RESPIRATION RATE: 16 BRPM | WEIGHT: 147.2 LBS | HEART RATE: 78 BPM | BODY MASS INDEX: 26.08 KG/M2 | DIASTOLIC BLOOD PRESSURE: 72 MMHG | HEIGHT: 63 IN | TEMPERATURE: 97.5 F

## 2022-03-10 DIAGNOSIS — E11.22 TYPE 2 DIABETES MELLITUS WITH STAGE 3A CHRONIC KIDNEY DISEASE, WITH LONG-TERM CURRENT USE OF INSULIN (HCC): Primary | ICD-10-CM

## 2022-03-10 DIAGNOSIS — Z79.4 TYPE 2 DIABETES MELLITUS WITH STAGE 3A CHRONIC KIDNEY DISEASE, WITH LONG-TERM CURRENT USE OF INSULIN (HCC): Primary | ICD-10-CM

## 2022-03-10 DIAGNOSIS — E55.9 VITAMIN D DEFICIENCY: ICD-10-CM

## 2022-03-10 DIAGNOSIS — N18.31 TYPE 2 DIABETES MELLITUS WITH STAGE 3A CHRONIC KIDNEY DISEASE, WITH LONG-TERM CURRENT USE OF INSULIN (HCC): Primary | ICD-10-CM

## 2022-03-10 DIAGNOSIS — I10 BENIGN ESSENTIAL HYPERTENSION: ICD-10-CM

## 2022-03-10 DIAGNOSIS — E78.2 MIXED HYPERLIPIDEMIA: ICD-10-CM

## 2022-03-10 DIAGNOSIS — Z23 ENCOUNTER FOR VACCINATION: ICD-10-CM

## 2022-03-10 DIAGNOSIS — K21.9 GERD WITHOUT ESOPHAGITIS: ICD-10-CM

## 2022-03-10 DIAGNOSIS — I48.0 PAROXYSMAL ATRIAL FIBRILLATION (HCC): ICD-10-CM

## 2022-03-10 DIAGNOSIS — K59.09 OTHER CONSTIPATION: ICD-10-CM

## 2022-03-10 DIAGNOSIS — F41.9 ANXIETY: ICD-10-CM

## 2022-03-10 DIAGNOSIS — N18.31 STAGE 3A CHRONIC KIDNEY DISEASE (HCC): ICD-10-CM

## 2022-03-10 PROBLEM — N39.0 URINARY TRACT INFECTION WITHOUT HEMATURIA: Status: RESOLVED | Noted: 2021-11-29 | Resolved: 2022-03-10

## 2022-03-10 PROCEDURE — 90471 IMMUNIZATION ADMIN: CPT | Performed by: FAMILY MEDICINE

## 2022-03-10 PROCEDURE — 90715 TDAP VACCINE 7 YRS/> IM: CPT | Performed by: FAMILY MEDICINE

## 2022-03-10 PROCEDURE — 3074F SYST BP LT 130 MM HG: CPT | Performed by: FAMILY MEDICINE

## 2022-03-10 PROCEDURE — 3078F DIAST BP <80 MM HG: CPT | Performed by: FAMILY MEDICINE

## 2022-03-10 PROCEDURE — 99214 OFFICE O/P EST MOD 30 MIN: CPT | Performed by: FAMILY MEDICINE

## 2022-03-10 NOTE — ASSESSMENT & PLAN NOTE
Well controlled   goal bp <140/80, pt's bp is at goal   HBPM: 120-130/70's   Bp today 122/72   Current regimen: hctz 12 5mg, lisinopril 20mg   Advised patient on symptoms of hypotension & severe HTN   Limit salt-intake & caffeine in diet, minimize stress level   Weight reduction efforts via improved diet & increased exercise recommend 150 minutes a week   DASH diet discussed and encouraged   Discussed importance of treating HTN to prevent ASCVD, CVA

## 2022-03-10 NOTE — ASSESSMENT & PLAN NOTE
Well controlled  Tolerating simvastatin 20mg Full Thickness Lip Wedge Repair (Flap) Text: Given the location of the defect and the proximity to free margins a full thickness wedge repair was deemed most appropriate.  Using a sterile surgical marker, the appropriate repair was drawn incorporating the defect and placing the expected incisions perpendicular to the vermilion border.  The vermilion border was also meticulously outlined to ensure appropriate reapproximation during the repair.  The area thus outlined was incised through and through with a #15 scalpel blade.  The muscularis and dermis were reaproximated with deep sutures following hemostasis. Care was taken to realign the vermilion border before proceeding with the superficial closure.  Once the vermilion was realigned the superfical and mucosal closure was finished.

## 2022-03-10 NOTE — ASSESSMENT & PLAN NOTE
Lab Results   Component Value Date    HGBA1C 7 0 (H) 03/08/2022     Well controlled  · Tolerating trulicity, and levemir 32O qhs  · Continue current regimen  · Foot exam completed today  · Up to date with diabetic eye exam

## 2022-03-10 NOTE — ASSESSMENT & PLAN NOTE
Stable  · Follows with cardiology Dr Leandro Mckinney  · Patient feels a few skipped beats  · Denies SALDIVAR or chest pain/tightness  · Rate controlled: atenolol 50mg and diltiazem 120mg  · Anticoagulated on Xarelto 20mg

## 2022-03-10 NOTE — PROGRESS NOTES
Patient's shoes and socks removed  Right Foot/Ankle   Right Foot Inspection  Skin Exam: skin normal, skin intact and dry skin  No warmth, no callus, no erythema, no maceration, no abnormal color, no pre-ulcer, no ulcer and no callus  Toe Exam: ROM and strength within normal limits  Sensory   Monofilament testing: intact    Vascular  Capillary refills: < 3 seconds  The right DP pulse is 2+  The right PT pulse is 2+  Left Foot/Ankle  Left Foot Inspection  Skin Exam: skin normal, skin intact and dry skin  No warmth, no erythema, no maceration, normal color, no pre-ulcer, no ulcer and no callus  Toe Exam: ROM and strength within normal limits  Sensory   Monofilament testing: intact    Vascular  Capillary refills: < 3 seconds  The left DP pulse is 2+  The left PT pulse is 2+  Assign Risk Category  No deformity present  No loss of protective sensation  No weak pulses  Risk: 0    Assessment/Plan:      1  Type 2 diabetes mellitus with stage 3a chronic kidney disease, with long-term current use of insulin (HCC)  Assessment & Plan:    Lab Results   Component Value Date    HGBA1C 7 0 (H) 03/08/2022     Well controlled  · Tolerating trulicity, and levemir 46I qhs  · Continue current regimen  · Foot exam completed today  · Up to date with diabetic eye exam    Orders:  -     HEMOGLOBIN A1C W/ EAG ESTIMATION; Future; Expected date: 06/10/2022  -     Comprehensive metabolic panel; Future; Expected date: 06/10/2022    2  GERD without esophagitis  Assessment & Plan:  Stable  · No need for daily medications      3  Paroxysmal atrial fibrillation (HCC)  Assessment & Plan:  Stable  · Follows with cardiology Dr Jayna Mclaughlin  · Patient feels a few skipped beats  · Denies SALDIVAR or chest pain/tightness  · Rate controlled: atenolol 50mg and diltiazem 120mg  · Anticoagulated on Xarelto 20mg      4   Benign essential hypertension  Assessment & Plan:  Well controlled   goal bp <140/80, pt's bp is at goal   HBPM: 120-130/70's   Bp today 122/72   Current regimen: hctz 12 5mg, lisinopril 20mg   Advised patient on symptoms of hypotension & severe HTN   Limit salt-intake & caffeine in diet, minimize stress level   Weight reduction efforts via improved diet & increased exercise recommend 150 minutes a week   DASH diet discussed and encouraged   Discussed importance of treating HTN to prevent ASCVD, CVA    Orders:  -     Comprehensive metabolic panel; Future; Expected date: 06/10/2022    5  Stage 3a chronic kidney disease [facility])  Assessment & Plan:  Lab Results   Component Value Date    EGFR 60 03/08/2022    EGFR 59 12/01/2021    EGFR 70 07/28/2021    CREATININE 0 88 03/08/2022    CREATININE 0 90 12/01/2021    CREATININE 0 78 07/28/2021     stable    Orders:  -     Comprehensive metabolic panel; Future; Expected date: 06/10/2022    6  Vitamin D deficiency    7  Other constipation  Assessment & Plan:  · Patient has occasional constipation  · She has miralax to use but is unclear if she is using the right amount  · Discussed with patient on the correct dosage      8  Mixed hyperlipidemia  Assessment & Plan:  Well controlled  Tolerating simvastatin 20mg        9  Anxiety  Assessment & Plan:  Improved  · Able to manage herself  · Hardly needs hydroxyzine 25mg, continue as needed      10  Encounter for vaccination  -     TDAP VACCINE GREATER THAN OR EQUAL TO 8YO IM          Subjective:      Patient ID: Nat Elliott is a 82 y o  female presents today for routine follow up  Her main complaint is ear pain that waxes and wanes    HPI    The following portions of the patient's history were reviewed and updated as appropriate: allergies, current medications, past family history, past medical history, past social history, past surgical history and problem list     Review of Systems   Constitutional: Negative for activity change, chills, diaphoresis and fever     HENT: Negative for ear pain, hearing loss, postnasal drip, rhinorrhea, sinus pressure, sinus pain, sneezing and sore throat  Respiratory: Negative for cough, chest tightness, shortness of breath and wheezing  Cardiovascular: Negative for chest pain, palpitations and leg swelling  Gastrointestinal: Negative for abdominal pain, blood in stool, constipation, diarrhea, nausea and vomiting  Genitourinary: Negative for dysuria, frequency, hematuria and urgency  Musculoskeletal: Negative for arthralgias and myalgias  Neurological: Negative for dizziness, syncope, weakness, light-headedness, numbness and headaches  Objective:      /72 (BP Location: Left arm, Patient Position: Sitting, Cuff Size: Adult)   Pulse 78   Temp 97 5 °F (36 4 °C) (Tympanic)   Resp 16   Ht 5' 2 5" (1 588 m)   Wt 66 8 kg (147 lb 3 2 oz)   SpO2 95%   BMI 26 49 kg/m²          Physical Exam  Vitals reviewed  Constitutional:       General: She is not in acute distress  Appearance: She is well-developed  She is not diaphoretic  HENT:      Head: Normocephalic and atraumatic  Right Ear: Tympanic membrane, ear canal and external ear normal  There is no impacted cerumen  Left Ear: Tympanic membrane, ear canal and external ear normal  There is no impacted cerumen  Nose: Nose normal  No congestion or rhinorrhea  Mouth/Throat:      Mouth: Mucous membranes are moist       Pharynx: Oropharynx is clear  No oropharyngeal exudate  Eyes:      General: No scleral icterus  Right eye: No discharge  Left eye: No discharge  Conjunctiva/sclera: Conjunctivae normal       Pupils: Pupils are equal, round, and reactive to light  Neck:      Thyroid: No thyromegaly  Vascular: No JVD  Trachea: No tracheal deviation  Cardiovascular:      Rate and Rhythm: Normal rate and regular rhythm  Pulses: no weak pulses          Dorsalis pedis pulses are 2+ on the right side and 2+ on the left side          Posterior tibial pulses are 2+ on the right side and 2+ on the left side  Heart sounds: Normal heart sounds  No murmur heard  No friction rub  No gallop  Pulmonary:      Effort: Pulmonary effort is normal  No respiratory distress  Breath sounds: Normal breath sounds  No wheezing or rales  Chest:      Chest wall: No tenderness  Abdominal:      General: Bowel sounds are normal  There is no distension  Palpations: Abdomen is soft  Tenderness: There is no abdominal tenderness  There is no right CVA tenderness, left CVA tenderness, guarding or rebound  Musculoskeletal:         General: Normal range of motion  Cervical back: Normal range of motion and neck supple  No tenderness  Right lower leg: No edema  Left lower leg: No edema  Feet:      Right foot:      Skin integrity: Dry skin present  No ulcer, skin breakdown, erythema, warmth or callus  Left foot:      Skin integrity: Dry skin present  No ulcer, skin breakdown, erythema, warmth or callus  Lymphadenopathy:      Cervical: No cervical adenopathy  Skin:     General: Skin is warm and dry  Neurological:      Mental Status: She is alert and oriented to person, place, and time  Mental status is at baseline  Psychiatric:         Mood and Affect: Mood normal          Behavior: Behavior normal          Thought Content:  Thought content normal          Judgment: Judgment normal

## 2022-03-10 NOTE — ASSESSMENT & PLAN NOTE
· Patient has occasional constipation  · She has miralax to use but is unclear if she is using the right amount  · Discussed with patient on the correct dosage

## 2022-03-10 NOTE — ASSESSMENT & PLAN NOTE
Was evaluated by urogyn: Dr Peter Thakur  Starting estardiol cream for now   Considering pessary in the future  Not likely to need any procedures or surgeries

## 2022-03-10 NOTE — ASSESSMENT & PLAN NOTE
Lab Results   Component Value Date    EGFR 60 03/08/2022    EGFR 59 12/01/2021    EGFR 70 07/28/2021    CREATININE 0 88 03/08/2022    CREATININE 0 90 12/01/2021    CREATININE 0 78 07/28/2021     stable

## 2022-03-14 ENCOUNTER — TELEMEDICINE (OUTPATIENT)
Dept: FAMILY MEDICINE CLINIC | Facility: CLINIC | Age: 87
End: 2022-03-14
Payer: COMMERCIAL

## 2022-03-14 VITALS — TEMPERATURE: 99.8 F

## 2022-03-14 DIAGNOSIS — Z20.822 SUSPECTED COVID-19 VIRUS INFECTION: Primary | ICD-10-CM

## 2022-03-14 PROCEDURE — 99213 OFFICE O/P EST LOW 20 MIN: CPT | Performed by: NURSE PRACTITIONER

## 2022-03-14 PROCEDURE — 1036F TOBACCO NON-USER: CPT | Performed by: NURSE PRACTITIONER

## 2022-03-14 PROCEDURE — 1160F RVW MEDS BY RX/DR IN RCRD: CPT | Performed by: NURSE PRACTITIONER

## 2022-03-14 PROCEDURE — 87636 SARSCOV2 & INF A&B AMP PRB: CPT | Performed by: NURSE PRACTITIONER

## 2022-03-14 NOTE — PROGRESS NOTES
COVID-19 Outpatient Progress Note    Assessment/Plan:    Problem List Items Addressed This Visit     None      Visit Diagnoses     Suspected COVID-19 virus infection    -  Primary    Relevant Orders    Covid/Flu- Office Collect         Disposition:     I recommended the patient to come to our office to perform rapid antigen testing for COVID-19  Recommended patient to come to the office to test for COVID-19  I have spent 15 minutes directly with the patient  Greater than 50% of this time was spent in counseling/coordination of care regarding: instructions for management, patient and family education, importance of treatment compliance, risk factor reductions and impressions  Encounter provider MAJOR Arnold    Provider located at [address] [facility]  [address] [place] [id number]    Recent Visits  Date Type Provider Dept   03/10/22 Office Visit Michelle [facility], [address]   Showing recent visits within past 7 days and meeting all other requirements  Today's Visits  Date Type Provider Dept   03/14/22 Telemedicine Yadira Blanchard, [address]   Showing today's visits and meeting all other requirements  Future Appointments  No visits were found meeting these conditions  Showing future appointments within next 150 days and meeting all other requirements     This virtual check-in was done via telephone and she agrees to proceed  Patient agrees to participate in a virtual check in via telephone or video visit instead of presenting to the office to address urgent/immediate medical needs  Patient is aware this is a billable service  After connecting through Telephone, the patient was identified by name and date of birth  Lawler Isatuxiomara was informed that this was a telemedicine visit and that the exam was being conducted confidentially over secure lines  My office door was closed   No one else was in the room  Benito Beckwith acknowledged consent and understanding of privacy and security of the telemedicine visit  I informed the patient that I have reviewed her record in Epic and presented the opportunity for her to ask any questions regarding the visit today  The patient agreed to participate  It was my intent to perform this visit via video technology but the patient was not able to do a video connection so the visit was completed via audio telephone only  Verification of patient location:  Patient is located in the following state in which I hold an active license: [place]    Subjective:   Benito Beckwith is a 82 y o  female who is concerned about COVID-19  Patient's symptoms include chills, fatigue, malaise, rhinorrhea, sore throat and myalgias  Patient denies fever, congestion, anosmia, loss of taste, cough, shortness of breath, chest tightness, abdominal pain, nausea, vomiting, diarrhea and headaches       - Date of symptom onset: 3/13/2022      COVID-19 vaccination status: Fully vaccinated with booster    Exposure:   Contact with a person who is under investigation (PUI) for or who is positive for COVID-19 within the last 14 days?: No    Hospitalized recently for fever and/or lower respiratory symptoms?: No      Currently a healthcare worker that is involved in direct patient care?: No      Works in a special setting where the risk of COVID-19 transmission may be high? (this may include long-term care, correctional and assisted facilities; homeless shelters; assisted-living facilities and group homes ): No      Resident in a special setting where the risk of COVID-19 transmission may be high? (this may include long-term care, correctional and assisted facilities; homeless shelters; assisted-living facilities and group homes ): No      Lab Results   Component Value Date    [facility] Not Detected 01/31/2021     Past Medical History:   Diagnosis Date    Anemia     IRON DEF ANEMIA DUE TO MENORRHAGIA  Diabetes mellitus ([facility] )     Hypercalcemia 10/06/2010    MILD  DECREASES HCTZ    Hypertension     Melanoma ([facility] ) 11/2011    IN SITU  MID BACK    Palpitations 05/28/2018    SINUS TACHYCARDIA  TROPONIN SPLL    Skin cancer 2017    NOSE     Past Surgical History:   Procedure Laterality Date    GALLBLADDER SURGERY  1991    HYSTERECTOMY  1995    UTERINE PROLAPSE VAGINAL POLYPS    HYSTERECTOMY      IRON REPLACEMENT    OTHER SURGICAL HISTORY  11/2011    EXCISION MELANOMA IN SITU MID BACK    PARATHYROIDECTOMY  05/21/2018     Current Outpatient Medications   Medication Sig Dispense Refill    atenolol (TENORMIN) 50 mg tablet Take 1 tablet (50 mg total) by mouth 2 (two) times a day 180 tablet 3    BD Pen Needle Glory U/F 32G X 4 MM MISC TEST DAILY AS DIRECTED 100 each 3    Blood Glucose Monitoring Suppl (Accu-Chek Guide Me) w/Device KIT USE DAILY AS DIRECTED 1 kit 0    diltiazem (CARDIZEM CD) 120 mg 24 hr capsule TAKE 1 CAPSULE BY MOUTH EVERY DAY 90 capsule 3    Dulaglutide (Trulicity) 9 37 NE/0 9IC SOPN Inject 0 5 mL (0 75 mg total) under the skin once a week 5 mL 5    estradiol (ESTRACE) 0 1 mg/g vaginal cream Insert 2 g into the vagina daily      glucose blood (Accu-Chek Yelitza Plus) test strip Use 1 each daily Use as instructed 100 strip 3    hydrochlorothiazide (HYDRODIURIL) 12 5 mg tablet Take 1 tablet (12 5 mg total) by mouth daily 90 tablet 3    hydrOXYzine HCL (ATARAX) 25 mg tablet TAKE 1 TABLET TWICE DAILY AS NEEDED FOR ANXIETY 60 tablet 3    insulin detemir (Levemir FlexTouch) 100 Units/mL injection pen Inject 10 Units under the skin daily at bedtime 15 mL 3    Lancets (accu-chek multiclix) lancets Use daily Use as instructed 100 each 3    lisinopril (ZESTRIL) 20 mg tablet Take 1 tablet (20 mg total) by mouth daily 90 tablet 3    MICROLET LANCETS MISC by Does not apply route daily 50 each 3    simvastatin (ZOCOR) 20 mg tablet Take 1 tablet (20 mg total) by mouth daily 90 tablet 3    Trulicity 9 21 UI/5 6NN SOPN INJECT 0 5 ML (0 75 MG TOTAL) UNDER THE SKIN ONCE A WEEK 5 mL 5    Vitamin D, Cholecalciferol, 50 MCG (2000 UT) CAPS Take 1 capsule by mouth daily      Xarelto 20 MG tablet TAKE 1 TABLET (20 MG TOTAL) BY MOUTH EVERY 24 HOURS 90 tablet 3     No current facility-administered medications for this visit  Allergies   Allergen Reactions    No Active Allergies        Review of Systems   Constitutional: Positive for chills and fatigue  Negative for fever  HENT: Positive for rhinorrhea and sore throat  Negative for congestion  Respiratory: Negative for cough, chest tightness and shortness of breath  Gastrointestinal: Negative for abdominal pain, diarrhea, nausea and vomiting  Musculoskeletal: Positive for myalgias  Neurological: Negative for headaches  Objective:    Vitals:    03/14/22 1426   Temp: 99 8 °F (37 7 °C)       Physical Exam    VIRTUAL VISIT [facility] verbally agrees to participate in [employer]  Pt is aware that [employer] could be limited without vital signs or the ability to perform a full hands-on physical Supriya Bright understands she or the provider may request at any time to terminate the video visit and request the patient to seek care or treatment in person

## 2022-03-15 ENCOUNTER — TELEPHONE (OUTPATIENT)
Dept: FAMILY MEDICINE CLINIC | Facility: CLINIC | Age: 87
End: 2022-03-15

## 2022-03-15 LAB
FLUAV RNA RESP QL NAA+PROBE: NEGATIVE
FLUBV RNA RESP QL NAA+PROBE: NEGATIVE
SARS-COV-2 RNA RESP QL NAA+PROBE: NEGATIVE

## 2022-03-15 NOTE — TELEPHONE ENCOUNTER
----- Message [place] sent at 3/15/2022 12:01 PM EDT -----  Please call patient with negative covid and flu result

## 2022-04-20 ENCOUNTER — HOSPITAL ENCOUNTER (OUTPATIENT)
Dept: MAMMOGRAPHY | Facility: CLINIC | Age: 87
Discharge: HOME | End: 2022-04-20
Payer: COMMERCIAL

## 2022-04-20 VITALS — WEIGHT: 147 LBS | BODY MASS INDEX: 27.05 KG/M2 | HEIGHT: 62 IN

## 2022-04-20 DIAGNOSIS — Z12.31 ENCOUNTER FOR SCREENING MAMMOGRAM FOR MALIGNANT NEOPLASM OF BREAST: ICD-10-CM

## 2022-04-20 PROCEDURE — 77063 BREAST TOMOSYNTHESIS BI: CPT

## 2022-04-20 PROCEDURE — 77067 SCR MAMMO BI INCL CAD: CPT

## 2022-05-27 DIAGNOSIS — E11.9 TYPE 2 DIABETES MELLITUS WITHOUT COMPLICATION, UNSPECIFIED WHETHER LONG TERM INSULIN USE (HCC): ICD-10-CM

## 2022-05-27 RX ORDER — PEN NEEDLE, DIABETIC 32GX 5/32"
NEEDLE, DISPOSABLE MISCELLANEOUS DAILY
Qty: 100 EACH | Refills: 3 | Status: SHIPPED | OUTPATIENT
Start: 2022-05-27

## 2022-05-31 DIAGNOSIS — Z79.4 TYPE 2 DIABETES MELLITUS WITH HYPERGLYCEMIA, WITH LONG-TERM CURRENT USE OF INSULIN (HCC): ICD-10-CM

## 2022-05-31 DIAGNOSIS — E11.65 TYPE 2 DIABETES MELLITUS WITH HYPERGLYCEMIA, WITH LONG-TERM CURRENT USE OF INSULIN (HCC): ICD-10-CM

## 2022-06-01 RX ORDER — INSULIN DETEMIR 100 [IU]/ML
10 INJECTION, SOLUTION SUBCUTANEOUS
Qty: 15 ML | Refills: 3 | Status: SHIPPED | OUTPATIENT
Start: 2022-06-01

## 2022-06-09 ENCOUNTER — RA CDI HCC (OUTPATIENT)
Dept: OTHER | Facility: HOSPITAL | Age: 87
End: 2022-06-09

## 2022-06-09 NOTE — PROGRESS NOTES
[facility]  coding opportunities       Chart reviewed, no opportunity found: CHART REVIEWED, NO OPPORTUNITY FOUND        Patients Insurance     Medicare Insurance: [employer] [facility] Advantage

## 2022-06-13 ENCOUNTER — APPOINTMENT (OUTPATIENT)
Dept: LAB | Facility: HOSPITAL | Age: 87
End: 2022-06-13
Payer: COMMERCIAL

## 2022-06-13 DIAGNOSIS — Z79.4 TYPE 2 DIABETES MELLITUS WITH STAGE 3A CHRONIC KIDNEY DISEASE, WITH LONG-TERM CURRENT USE OF INSULIN (HCC): ICD-10-CM

## 2022-06-13 DIAGNOSIS — I10 BENIGN ESSENTIAL HYPERTENSION: ICD-10-CM

## 2022-06-13 DIAGNOSIS — N18.31 STAGE 3A CHRONIC KIDNEY DISEASE (HCC): ICD-10-CM

## 2022-06-13 DIAGNOSIS — N18.31 TYPE 2 DIABETES MELLITUS WITH STAGE 3A CHRONIC KIDNEY DISEASE, WITH LONG-TERM CURRENT USE OF INSULIN (HCC): ICD-10-CM

## 2022-06-13 DIAGNOSIS — E11.22 TYPE 2 DIABETES MELLITUS WITH STAGE 3A CHRONIC KIDNEY DISEASE, WITH LONG-TERM CURRENT USE OF INSULIN (HCC): ICD-10-CM

## 2022-06-13 LAB
ALBUMIN SERPL BCP-MCNC: 3.9 G/DL (ref 3–5.2)
ALP SERPL-CCNC: 82 U/L (ref 43–122)
ALT SERPL W P-5'-P-CCNC: 21 U/L
ANION GAP SERPL CALCULATED.3IONS-SCNC: 7 MMOL/L (ref 5–14)
AST SERPL W P-5'-P-CCNC: 29 U/L (ref 14–36)
BILIRUB SERPL-MCNC: 0.6 MG/DL
BUN SERPL-MCNC: 19 MG/DL (ref 5–25)
CALCIUM SERPL-MCNC: 10.1 MG/DL (ref 8.4–10.2)
CHLORIDE SERPL-SCNC: 101 MMOL/L (ref 97–108)
CO2 SERPL-SCNC: 31 MMOL/L (ref 22–30)
CREAT SERPL-MCNC: 0.87 MG/DL (ref 0.6–1.2)
EST. AVERAGE GLUCOSE BLD GHB EST-MCNC: 154 MG/DL
GFR SERPL CREATININE-BSD FRML MDRD: 61 ML/MIN/1.73SQ M
GLUCOSE P FAST SERPL-MCNC: 134 MG/DL (ref 70–99)
HBA1C MFR BLD: 7 %
POTASSIUM SERPL-SCNC: 3.9 MMOL/L (ref 3.6–5)
PROT SERPL-MCNC: 7.4 G/DL (ref 5.9–8.4)
SODIUM SERPL-SCNC: 139 MMOL/L (ref 137–147)

## 2022-06-13 PROCEDURE — 83036 HEMOGLOBIN GLYCOSYLATED A1C: CPT

## 2022-06-13 PROCEDURE — 80053 COMPREHEN METABOLIC PANEL: CPT

## 2022-06-13 PROCEDURE — 36415 COLL VENOUS BLD VENIPUNCTURE: CPT

## 2022-06-22 ENCOUNTER — OFFICE VISIT (OUTPATIENT)
Dept: FAMILY MEDICINE CLINIC | Facility: CLINIC | Age: 87
End: 2022-06-22
Payer: COMMERCIAL

## 2022-06-22 VITALS
HEIGHT: 62 IN | TEMPERATURE: 97.7 F | BODY MASS INDEX: 27.49 KG/M2 | HEART RATE: 77 BPM | RESPIRATION RATE: 18 BRPM | OXYGEN SATURATION: 97 % | DIASTOLIC BLOOD PRESSURE: 86 MMHG | SYSTOLIC BLOOD PRESSURE: 142 MMHG | WEIGHT: 149.4 LBS

## 2022-06-22 DIAGNOSIS — N18.31 STAGE 3A CHRONIC KIDNEY DISEASE (HCC): ICD-10-CM

## 2022-06-22 DIAGNOSIS — K21.9 GERD WITHOUT ESOPHAGITIS: ICD-10-CM

## 2022-06-22 DIAGNOSIS — Z79.4 TYPE 2 DIABETES MELLITUS WITH STAGE 3A CHRONIC KIDNEY DISEASE, WITH LONG-TERM CURRENT USE OF INSULIN (HCC): ICD-10-CM

## 2022-06-22 DIAGNOSIS — N18.1 TYPE 2 DIABETES MELLITUS WITH STAGE 1 CHRONIC KIDNEY DISEASE, WITH LONG-TERM CURRENT USE OF INSULIN (HCC): ICD-10-CM

## 2022-06-22 DIAGNOSIS — N18.31 TYPE 2 DIABETES MELLITUS WITH STAGE 3A CHRONIC KIDNEY DISEASE, WITH LONG-TERM CURRENT USE OF INSULIN (HCC): ICD-10-CM

## 2022-06-22 DIAGNOSIS — E78.2 MIXED HYPERLIPIDEMIA: ICD-10-CM

## 2022-06-22 DIAGNOSIS — E11.22 TYPE 2 DIABETES MELLITUS WITH STAGE 1 CHRONIC KIDNEY DISEASE, WITH LONG-TERM CURRENT USE OF INSULIN (HCC): ICD-10-CM

## 2022-06-22 DIAGNOSIS — I48.91 ATRIAL FIBRILLATION, UNSPECIFIED TYPE (HCC): ICD-10-CM

## 2022-06-22 DIAGNOSIS — I10 BENIGN ESSENTIAL HYPERTENSION: ICD-10-CM

## 2022-06-22 DIAGNOSIS — Z79.4 TYPE 2 DIABETES MELLITUS WITH STAGE 1 CHRONIC KIDNEY DISEASE, WITH LONG-TERM CURRENT USE OF INSULIN (HCC): ICD-10-CM

## 2022-06-22 DIAGNOSIS — E11.22 TYPE 2 DIABETES MELLITUS WITH STAGE 3A CHRONIC KIDNEY DISEASE, WITH LONG-TERM CURRENT USE OF INSULIN (HCC): ICD-10-CM

## 2022-06-22 DIAGNOSIS — I48.0 PAROXYSMAL ATRIAL FIBRILLATION (HCC): ICD-10-CM

## 2022-06-22 PROCEDURE — 3288F FALL RISK ASSESSMENT DOCD: CPT | Performed by: FAMILY MEDICINE

## 2022-06-22 PROCEDURE — 1101F PT FALLS ASSESS-DOCD LE1/YR: CPT | Performed by: FAMILY MEDICINE

## 2022-06-22 PROCEDURE — 1160F RVW MEDS BY RX/DR IN RCRD: CPT | Performed by: FAMILY MEDICINE

## 2022-06-22 PROCEDURE — 3725F SCREEN DEPRESSION PERFORMED: CPT | Performed by: FAMILY MEDICINE

## 2022-06-22 PROCEDURE — 99214 OFFICE O/P EST MOD 30 MIN: CPT | Performed by: FAMILY MEDICINE

## 2022-06-22 PROCEDURE — 3079F DIAST BP 80-89 MM HG: CPT | Performed by: FAMILY MEDICINE

## 2022-06-22 PROCEDURE — 3077F SYST BP >= 140 MM HG: CPT | Performed by: FAMILY MEDICINE

## 2022-06-22 PROCEDURE — 1036F TOBACCO NON-USER: CPT | Performed by: FAMILY MEDICINE

## 2022-06-22 RX ORDER — DULAGLUTIDE 0.75 MG/.5ML
0.75 INJECTION, SOLUTION SUBCUTANEOUS WEEKLY
Qty: 5 ML | Refills: 5 | Status: SHIPPED | OUTPATIENT
Start: 2022-06-22

## 2022-06-22 RX ORDER — HYDROCHLOROTHIAZIDE 12.5 MG/1
12.5 TABLET ORAL DAILY
Qty: 90 TABLET | Refills: 3 | Status: SHIPPED | OUTPATIENT
Start: 2022-06-22

## 2022-06-22 RX ORDER — LISINOPRIL 20 MG/1
20 TABLET ORAL DAILY
Qty: 90 TABLET | Refills: 3 | Status: SHIPPED | OUTPATIENT
Start: 2022-06-22

## 2022-06-22 RX ORDER — DILTIAZEM HYDROCHLORIDE 120 MG/1
120 CAPSULE, COATED, EXTENDED RELEASE ORAL DAILY
Qty: 90 CAPSULE | Refills: 3 | Status: SHIPPED | OUTPATIENT
Start: 2022-06-22

## 2022-06-22 RX ORDER — ATENOLOL 50 MG/1
50 TABLET ORAL 2 TIMES DAILY
Qty: 180 TABLET | Refills: 3 | Status: SHIPPED | OUTPATIENT
Start: 2022-06-22

## 2022-06-22 NOTE — ASSESSMENT & PLAN NOTE
Well controlled   goal bp <140/90, pt's bp is at goal   HBPM: 109-134/57-72   Current regimen: hctz 12 5mg and lisinopril 20mg   Advised patient on symptoms of hypotension & severe HTN   Limit salt-intake & caffeine in diet, minimize stress level   Weight reduction efforts via improved diet & increased exercise recommend 150 minutes a week   DASH diet discussed and encouraged   Discussed importance of treating HTN to prevent ASCVD, CVA

## 2022-06-22 NOTE — PATIENT INSTRUCTIONS
If Trulicity remains too expensive in the donut hole you may stop it  You may then increase your Levemir to 12 units and monitor your morning blood sugars  Goal morning blood sugar is less than 150  If it is higher than that after a week or 2 you may increase the Levemir to 14 units  Monitor for low blood sugars and if they occur call our office  Once your through the donut hole you may restart her Trulicity go back to 10 units of Levemir at bedtime

## 2022-06-22 NOTE — ASSESSMENT & PLAN NOTE
Lab Results   Component Value Date    HGBA1C 7 0 (H) 06/13/2022     Well controlled   Fasting BG range: 110-120's   Goal A1C <7   Reconciled home medication regimen: levemir 48G and trulicity 2 38   Discussed importance of strict carb control diet and weight reduction   Up to date with diabetic eye exam and Diabetic foot exam   Follow up in 3 months

## 2022-06-22 NOTE — ASSESSMENT & PLAN NOTE
Stable  · Denies palpitations  · Follows with Dr Jesika Light [facility]  · Anticoagulated on Zarelto 20mg  · Rate controlled on atenolol 50mg and diltiazem 1290mg

## 2022-06-22 NOTE — ASSESSMENT & PLAN NOTE
Lab Results   Component Value Date    EGFR 61 06/13/2022    EGFR 60 03/08/2022    EGFR 59 12/01/2021    CREATININE 0 87 06/13/2022    CREATININE 0 88 03/08/2022    CREATININE 0 90 12/01/2021     Well controlled

## 2022-06-22 NOTE — PROGRESS NOTES
BMI Counseling: Body mass index is 27 33 kg/m²  The BMI is above normal  Nutrition recommendations include decreasing portion sizes, encouraging healthy choices of fruits and vegetables, decreasing fast food intake, limiting drinks that contain sugar, moderation in carbohydrate intake, reducing intake of saturated and trans fat and reducing intake of cholesterol  Exercise recommendations include moderate physical activity 150 minutes/week and exercising 3-5 times per week  Rationale for BMI follow-up plan is due to patient being overweight or obese  Depression Screening and Follow-up Plan: Patient was screened for depression during today's encounter  They screened negative with a PHQ-2 score of 0  Assessment/Plan:      1  BMI 27 0-27 9,adult    2  Atrial fibrillation, unspecified type (HCC)  -     rivaroxaban (Xarelto) 20 mg tablet; Take 1 tablet (20 mg total) by mouth daily with breakfast    3  Benign essential hypertension  Assessment & Plan:  Well controlled  goal bp <140/90, pt's bp is at goal  HBPM: 109-134/57-72  Current regimen: hctz 12 5mg and lisinopril 20mg  Advised patient on symptoms of hypotension & severe HTN  Limit salt-intake & caffeine in diet, minimize stress level  Weight reduction efforts via improved diet & increased exercise recommend 150 minutes a week  DASH diet discussed and encouraged  Discussed importance of treating HTN to prevent ASCVD, CVA    Orders:  -     hydrochlorothiazide (HYDRODIURIL) 12 5 mg tablet; Take 1 tablet (12 5 mg total) by mouth daily  -     lisinopril (ZESTRIL) 20 mg tablet; Take 1 tablet (20 mg total) by mouth daily  -     atenolol (TENORMIN) 50 mg tablet; Take 1 tablet (50 mg total) by mouth 2 (two) times a day    4   Paroxysmal atrial fibrillation (HCC)  Assessment & Plan:  Stable  Denies palpitations  Follows with Dr Fried Ysura [facility]  Anticoagulated on Zarelto 20mg  Rate controlled on atenolol 50mg and diltiazem 1290mg    Orders:  -     diltiazem (CARDIZEM CD) 120 mg 24 hr capsule; Take 1 capsule (120 mg total) by mouth daily    5  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, with long-term current use of insulin (HCC)  -     Dulaglutide (Trulicity) 7 93 FL/9 8FD SOPN; Inject 0 5 mL (0 75 mg total) under the skin once a week  -     HEMOGLOBIN A1C W/ EAG ESTIMATION; Future; Expected date: 12/22/2022    6  Type 2 diabetes mellitus with stage 3a chronic kidney disease, with long-term current use of insulin (HCC)  Assessment & Plan:    Lab Results   Component Value Date    HGBA1C 7 0 (H) 06/13/2022     Well controlled  Fasting BG range: 110-120's  Goal A1C <7  Reconciled home medication regimen: levemir 83L and trulicity 5 05  Discussed importance of strict carb control diet and weight reduction  Up to date with diabetic eye exam and Diabetic foot exam  Follow up in 3 months      7  GERD without esophagitis  Assessment & Plan:  Stable   Does not need medications  Avoids trigger foods      8  Stage 3a chronic kidney disease [facility])  Assessment & Plan:  Lab Results   Component Value Date    EGFR 61 06/13/2022    EGFR 60 03/08/2022    EGFR 59 12/01/2021    CREATININE 0 87 06/13/2022    CREATININE 0 88 03/08/2022    CREATININE 0 90 12/01/2021     Well controlled    Orders:  -     Comprehensive metabolic panel; Future; Expected date: 12/22/2022    9  Mixed hyperlipidemia  Assessment & Plan:  Well controlled  Continue simvastatin 20mg daily  Recheck lipid panel 3/2023              Subjective:      Patient ID: Esau Franks is a 82 y o  female presents today for routine follow up  Patient is Concerned today because her Trulicity is too expensive for her  She is currently in the donut of Medicare would like to know what to do  Otherwise she feels fine is going on vacation soon for her [facility] to [facility]    The following portions of the patient's history were reviewed and updated as appropriate: allergies, current medications, past family history, past medical history, past social history, past surgical history and problem list     Review of Systems   Constitutional: Negative for activity change, chills, diaphoresis and fever  HENT: Negative for ear pain, hearing loss, postnasal drip, rhinorrhea, sinus pressure, sinus pain, sneezing and sore throat  Respiratory: Negative for cough, chest tightness, shortness of breath and wheezing  Cardiovascular: Negative for chest pain, palpitations and leg swelling  Gastrointestinal: Negative for abdominal pain, blood in stool, constipation, diarrhea, nausea and vomiting  Genitourinary: Negative for dysuria, frequency, hematuria and urgency  Musculoskeletal: Negative for arthralgias and myalgias  Neurological: Negative for dizziness, syncope, weakness, light-headedness, numbness and headaches  Objective:      /86 (BP Location: Left arm, Patient Position: Sitting, Cuff Size: Standard)   Pulse 77   Temp 97 7 °F (36 5 °C) (Temporal)   Resp 18   Ht 5' 2" (1 575 m)   Wt 67 8 kg (149 lb 6 4 oz)   SpO2 97%   BMI 27 33 kg/m²          Physical Exam  Vitals reviewed  Constitutional:       General: She is not in acute distress  Appearance: She is well-developed  She is not diaphoretic  HENT:      Head: Normocephalic and atraumatic  Right Ear: External ear normal       Left Ear: External ear normal       Nose: Nose normal  No congestion  Mouth/Throat:      Mouth: Mucous membranes are moist       Pharynx: Oropharynx is clear  No oropharyngeal exudate  Eyes:      General: No scleral icterus  Right eye: No discharge  Left eye: No discharge  Conjunctiva/sclera: Conjunctivae normal       Pupils: Pupils are equal, round, and reactive to light  Neck:      Thyroid: No thyromegaly  Vascular: No JVD  Trachea: No tracheal deviation  Cardiovascular:      Rate and Rhythm: Normal rate and regular rhythm  Heart sounds: Normal heart sounds   No murmur heard  No friction rub  No gallop  Pulmonary:      Effort: Pulmonary effort is normal  No respiratory distress  Breath sounds: Normal breath sounds  No wheezing or rales  Chest:      Chest wall: No tenderness  Abdominal:      General: Bowel sounds are normal  There is no distension  Palpations: Abdomen is soft  Tenderness: There is no abdominal tenderness  There is no right CVA tenderness, left CVA tenderness, guarding or rebound  Musculoskeletal:         General: Normal range of motion  Cervical back: Normal range of motion and neck supple  No tenderness  Right lower leg: No edema  Left lower leg: No edema  Lymphadenopathy:      Cervical: No cervical adenopathy  Skin:     General: Skin is warm and dry  Neurological:      Mental Status: She is alert and oriented to person, place, and time  Mental status is at baseline  Psychiatric:         Mood and Affect: Mood normal          Behavior: Behavior normal          Thought Content:  Thought content normal          Judgment: Judgment normal

## 2022-07-26 ENCOUNTER — OFFICE VISIT (OUTPATIENT)
Dept: FAMILY MEDICINE CLINIC | Facility: CLINIC | Age: 87
End: 2022-07-26
Payer: COMMERCIAL

## 2022-07-26 VITALS
WEIGHT: 148.6 LBS | OXYGEN SATURATION: 95 % | DIASTOLIC BLOOD PRESSURE: 72 MMHG | TEMPERATURE: 97 F | HEIGHT: 62 IN | HEART RATE: 77 BPM | SYSTOLIC BLOOD PRESSURE: 150 MMHG | RESPIRATION RATE: 16 BRPM | BODY MASS INDEX: 27.34 KG/M2

## 2022-07-26 DIAGNOSIS — R30.0 DYSURIA: Primary | ICD-10-CM

## 2022-07-26 LAB
BACTERIA UR QL AUTO: ABNORMAL /HPF
BILIRUB UR QL STRIP: NEGATIVE
CLARITY UR: CLEAR
COLOR UR: ABNORMAL
GLUCOSE UR STRIP-MCNC: NEGATIVE MG/DL
HGB UR QL STRIP.AUTO: NEGATIVE
KETONES UR STRIP-MCNC: NEGATIVE MG/DL
LEUKOCYTE ESTERASE UR QL STRIP: ABNORMAL
NITRITE UR QL STRIP: NEGATIVE
NON-SQ EPI CELLS URNS QL MICRO: ABNORMAL /HPF
PH UR STRIP.AUTO: 7 [PH]
PROT UR STRIP-MCNC: NEGATIVE MG/DL
RBC #/AREA URNS AUTO: ABNORMAL /HPF
SL AMB  POCT GLUCOSE, UA: NEGATIVE
SL AMB LEUKOCYTE ESTERASE,UA: 125
SL AMB POCT BILIRUBIN,UA: NEGATIVE
SL AMB POCT BLOOD,UA: ABNORMAL
SL AMB POCT CLARITY,UA: ABNORMAL
SL AMB POCT COLOR,UA: YELLOW
SL AMB POCT KETONES,UA: 5
SL AMB POCT NITRITE,UA: NEGATIVE
SL AMB POCT PH,UA: 6.5
SL AMB POCT SPECIFIC GRAVITY,UA: 1.01
SL AMB POCT URINE PROTEIN: 15
SL AMB POCT UROBILINOGEN: NEGATIVE
SP GR UR STRIP.AUTO: 1.01 (ref 1–1.03)
UROBILINOGEN UR STRIP-ACNC: <2 MG/DL
WBC #/AREA URNS AUTO: ABNORMAL /HPF

## 2022-07-26 PROCEDURE — 99214 OFFICE O/P EST MOD 30 MIN: CPT | Performed by: FAMILY MEDICINE

## 2022-07-26 PROCEDURE — 87086 URINE CULTURE/COLONY COUNT: CPT | Performed by: FAMILY MEDICINE

## 2022-07-26 PROCEDURE — 81001 URINALYSIS AUTO W/SCOPE: CPT | Performed by: FAMILY MEDICINE

## 2022-07-26 PROCEDURE — 81002 URINALYSIS NONAUTO W/O SCOPE: CPT | Performed by: FAMILY MEDICINE

## 2022-07-26 RX ORDER — SULFAMETHOXAZOLE AND TRIMETHOPRIM 800; 160 MG/1; MG/1
1 TABLET ORAL 2 TIMES DAILY
Qty: 6 TABLET | Refills: 0 | Status: SHIPPED | OUTPATIENT
Start: 2022-07-26 | End: 2022-07-29

## 2022-07-26 NOTE — PROGRESS NOTES
Assessment/Plan:      1  Dysuria  Assessment & Plan:  Worsening  Recurrent UTI vs progressive cystocele  Urine dip showed leuks and blood  Follow up urine culture  Since symptoms are similar to previous UTI will treat with bactrim  Follow up prn    Orders:  -     POCT urine dip  -     UA w Reflex to Microscopic w Reflex to Culture  -     sulfamethoxazole-trimethoprim (BACTRIM DS) 800-160 mg per tablet; Take 1 tablet by mouth 2 (two) times a day for 3 days  -     Urine Microscopic  -     Urine culture            Subjective:      Patient ID: Fernando Salamanca is a 82 y o  female presents today for urinary pressure and urgency worse when standing or walking  Denies dysuria  Started about 10 days ago  Has now developed urinary frequency yesterday  Was seen by urogyn  Symptoms improved with estradiol cream twice weekly  This feels similar to a UTI she had in the past  Had positive urine culture 1/24/2022  Was treated with bactrim BID for 3 days which resolved symptoms  HPI    The following portions of the patient's history were reviewed and updated as appropriate: allergies, current medications, past family history, past medical history, past social history, past surgical history and problem list     Review of Systems   Constitutional: Negative for activity change, chills, diaphoresis and fever  HENT: Negative for ear pain, hearing loss, postnasal drip, rhinorrhea, sinus pressure, sinus pain, sneezing and sore throat  Respiratory: Negative for cough, chest tightness, shortness of breath and wheezing  Cardiovascular: Negative for chest pain, palpitations and leg swelling  Gastrointestinal: Negative for abdominal pain, blood in stool, constipation, diarrhea, nausea and vomiting  Genitourinary: Positive for frequency and urgency  Negative for decreased urine volume, dysuria, hematuria, menstrual problem, pelvic pain, vaginal bleeding, vaginal discharge and vaginal pain     Musculoskeletal: Negative for arthralgias and myalgias  Neurological: Negative for dizziness, syncope, weakness, light-headedness, numbness and headaches  Psychiatric/Behavioral: Negative for dysphoric mood  The patient is not nervous/anxious  Objective:      /72 (BP Location: Left arm, Patient Position: Sitting, Cuff Size: Adult)   Pulse 77   Temp (!) 97 °F (36 1 °C) (Temporal)   Resp 16   Ht 5' 2" (1 575 m)   Wt 67 4 kg (148 lb 9 6 oz)   SpO2 95%   BMI 27 18 kg/m²          Physical Exam  Vitals reviewed  Constitutional:       General: She is not in acute distress  Appearance: She is well-developed  She is not diaphoretic  HENT:      Head: Normocephalic and atraumatic  Right Ear: External ear normal       Left Ear: External ear normal       Nose: Nose normal  No congestion  Mouth/Throat:      Mouth: Mucous membranes are moist       Pharynx: Oropharynx is clear  No oropharyngeal exudate  Eyes:      General: No scleral icterus  Right eye: No discharge  Left eye: No discharge  Conjunctiva/sclera: Conjunctivae normal       Pupils: Pupils are equal, round, and reactive to light  Neck:      Thyroid: No thyromegaly  Vascular: No JVD  Trachea: No tracheal deviation  Cardiovascular:      Rate and Rhythm: Normal rate and regular rhythm  Heart sounds: Normal heart sounds  No murmur heard  No friction rub  No gallop  Pulmonary:      Effort: Pulmonary effort is normal  No respiratory distress  Breath sounds: Normal breath sounds  No wheezing or rales  Chest:      Chest wall: No tenderness  Abdominal:      General: Bowel sounds are normal  There is no distension  Palpations: Abdomen is soft  Tenderness: There is no abdominal tenderness  There is no right CVA tenderness, left CVA tenderness, guarding or rebound  Musculoskeletal:         General: Normal range of motion  Cervical back: Normal range of motion and neck supple  No tenderness  Right lower leg: No edema  Left lower leg: No edema  Lymphadenopathy:      Cervical: No cervical adenopathy  Skin:     General: Skin is warm and dry  Neurological:      Mental Status: She is alert and oriented to person, place, and time  Mental status is at baseline  Psychiatric:         Mood and Affect: Mood normal          Behavior: Behavior normal          Thought Content:  Thought content normal          Judgment: Judgment normal

## 2022-07-26 NOTE — ASSESSMENT & PLAN NOTE
Worsening  · Recurrent UTI vs progressive cystocele  · Urine dip showed leuks and blood  · Follow up urine culture  · Since symptoms are similar to previous UTI will treat with bactrim  · Follow up prn

## 2022-07-27 LAB — BACTERIA UR CULT: NORMAL

## 2022-09-15 ENCOUNTER — OFFICE VISIT (OUTPATIENT)
Dept: CARDIOLOGY CLINIC | Facility: CLINIC | Age: 87
End: 2022-09-15
Payer: COMMERCIAL

## 2022-09-15 VITALS
SYSTOLIC BLOOD PRESSURE: 120 MMHG | WEIGHT: 149 LBS | HEART RATE: 68 BPM | HEIGHT: 62 IN | BODY MASS INDEX: 27.42 KG/M2 | DIASTOLIC BLOOD PRESSURE: 74 MMHG

## 2022-09-15 DIAGNOSIS — I48.0 PAROXYSMAL ATRIAL FIBRILLATION (HCC): Primary | ICD-10-CM

## 2022-09-15 DIAGNOSIS — I72.8 SPLENIC ARTERY ANEURYSM (HCC): ICD-10-CM

## 2022-09-15 DIAGNOSIS — Z79.4 TYPE 2 DIABETES MELLITUS WITH HYPERGLYCEMIA, WITH LONG-TERM CURRENT USE OF INSULIN (HCC): ICD-10-CM

## 2022-09-15 DIAGNOSIS — E78.2 MIXED HYPERLIPIDEMIA: ICD-10-CM

## 2022-09-15 DIAGNOSIS — I10 BENIGN ESSENTIAL HYPERTENSION: ICD-10-CM

## 2022-09-15 DIAGNOSIS — E11.65 TYPE 2 DIABETES MELLITUS WITH HYPERGLYCEMIA, WITH LONG-TERM CURRENT USE OF INSULIN (HCC): ICD-10-CM

## 2022-09-15 PROCEDURE — 3078F DIAST BP <80 MM HG: CPT | Performed by: INTERNAL MEDICINE

## 2022-09-15 PROCEDURE — 99214 OFFICE O/P EST MOD 30 MIN: CPT | Performed by: INTERNAL MEDICINE

## 2022-09-15 PROCEDURE — 1160F RVW MEDS BY RX/DR IN RCRD: CPT | Performed by: INTERNAL MEDICINE

## 2022-09-15 PROCEDURE — 3074F SYST BP LT 130 MM HG: CPT | Performed by: INTERNAL MEDICINE

## 2022-09-15 PROCEDURE — 93000 ELECTROCARDIOGRAM COMPLETE: CPT | Performed by: INTERNAL MEDICINE

## 2022-09-15 NOTE — PROGRESS NOTES
[facility]  [id number] [facility]   [phone]  [id number]  Phone#  [phone]   Fax#  [phone]  *-*-*-*-*-*-*-*-*-*-*-*-*-*-*-*-*-*-*-*-*-*-*-*-*-*-*-*-*-*-*-*-*-*-*-*-*-*-*-*-*-*-*-*-*-*-*-*-*-*-*-*-*-*                                   Cardiology Follow Up      ENCOUNTER DATE: 09/15/22 2:02 PM  PATIENT NAME: Cherelle Isabel   : 1935    MRN: [id number]  AGE:87 y o  SEX: female  [address] MD     PRIMARY CARE PHYSICIAN: Candelario Ramos DO    ACTIVE DIAGNOSIS THIS VISIT  1  Paroxysmal atrial fibrillation (HCC)  POCT ECG   2  Mixed hyperlipidemia     3  Benign essential hypertension     4  Splenic artery aneurysm ([address] )     5  Type 2 diabetes mellitus with hyperglycemia, with long-term current use of insulin ([address] )       ACTIVE PROBLEM LIST  Patient Active Problem List   Diagnosis    Benign essential hypertension    Prolapse of anterior vaginal wall    Fibrocystic breast disease    Hypercalcemia    Incontinence    Kyphosis    Microproteinuria    Ventricular premature depolarization    Tinnitus    Paroxysmal atrial fibrillation (HCC)    Mixed hyperlipidemia    Type 2 diabetes mellitus with stage 3a chronic kidney disease, with long-term current use of insulin (HCC)    Splenic artery aneurysm (HCC)    Thyroid nodule    Vitamin D deficiency    BMI 28 0-28 9,adult    Osteopenia of multiple sites    Stage 3a chronic kidney disease ([address] )    Dysequilibrium    Anxiety    Hypoalbuminemia    Adjustment disorder with anxious mood    Avinger-Walker grade 2 cystocele    Type 2 diabetes mellitus with hyperglycemia (HCC)    GERD without esophagitis    Urinary urgency    Other constipation    Dysuria       CARDIOLOGY SPECIALTY COMMENTS  Patient was 1st seen by us for new onset atrial fibrillation with RVR postop parathyroidectomy by Dr Phan Cue   She is a 2nd admission for the same and at that time had a type 2 non ST elevation myocardial infarction  CT scan PE study was negative  Past medical history includes hypertension, hyperlipidemia and diabetes mellitus  05/29/2018 stress test: Negative Persantine stress test  LVEF 77%, hyperdynamic ventricle  Normal tomographic perfusion series  05/23/2018 echocardiogram: Normal left ventricular systolic function, EF 84-76%  Intermediate left ventricular diastolic function  Normal left ventricular filling pressures  Mild-to-moderate left atrial enlargement  Aortic sclerosis without stenosis  05/14/2020 ambulatory extended monitor  Agree with preliminary report except: There is one short 12 beat run of SVT  Although some P waves are noted, there is also a short area of irregularity without defined P waves which may be atrial fibrillation  11/11/2020 Holter monitor: Average heart rate 66 bpm, heart rate range 56 to 86 bpm  Rare ventricular and supraventricular ectopics  No significant pauses  No atrial fibrillation  INTERVAL HISTORY:        Patient with a history of paroxysmal atrial fibrillation returns  Last visit she was having an increase in palpitations  However she states that over the last 6 months she has had extremely rare palpitations of very short duration  Her EKG today demonstrates normal sinus rhythm  She has had no other cardiac symptoms  Her blood pressure is well controlled  Her last cholesterol values were excellent  DISCUSSION/PLAN:          Return in 6 months      Lab Studies:    Lab Results   Component Value Date    CHOLESTEROL 139 03/08/2022    CHOLESTEROL 143 07/28/2021    CHOLESTEROL 134 07/31/2020     Lab Results   Component Value Date    TRIG 59 03/08/2022    TRIG 53 07/28/2021    TRIG 46 07/31/2020     Lab Results   Component Value Date    HDL 57 03/08/2022    HDL 58 07/28/2021    HDL 61 07/31/2020     Lab Results   Component Value Date    LDLCALC 70 03/08/2022    LDLCALC 74 07/28/2021    LDLCALC 64 07/31/2020     Lab Results   Component Value Date    NONHDL 93 06/18/2019    NONHDL 78 12/03/2018    NONHDL 95 02/01/2018     Lab Results   Component Value Date    HGBA1C 7 0 (H) 06/13/2022      Lab Results   Component Value Date    EGFR 61 06/13/2022    EGFR 60 03/08/2022    EGFR 59 12/01/2021    SODIUM 139 06/13/2022    SODIUM 137 03/08/2022    SODIUM 135 (L) 12/01/2021    K 3 9 06/13/2022    K 3 8 03/08/2022    K 4 1 12/01/2021     06/13/2022     03/08/2022    CL 99 12/01/2021    CO2 31 (H) 06/13/2022    CO2 30 03/08/2022    CO2 34 (H) 12/01/2021    ANIONGAP 7 05/29/2018    ANIONGAP 12 05/28/2018    ANIONGAP 8 05/24/2018    BUN 19 06/13/2022    BUN 20 03/08/2022    BUN 26 (H) 12/01/2021    CREATININE 0 87 06/13/2022    CREATININE 0 88 03/08/2022    CREATININE 0 90 12/01/2021     Lab Results   Component Value Date    WBC 10 90 (H) 01/16/2019    WBC 9 2 05/29/2018    WBC 9 3 05/28/2018    HGB 13 6 01/16/2019    HGB 12 8 05/29/2018    HGB 13 1 05/28/2018    HCT 42 6 01/16/2019    HCT 38 4 05/29/2018    HCT 38 3 05/28/2018    MCV 93 01/16/2019    MCV 90 05/29/2018    MCV 91 05/28/2018    MCH 29 8 01/16/2019    MCH 30 1 05/29/2018    MCH 31 0 05/28/2018    MCHC 31 9 01/16/2019    MCHC 33 5 05/29/2018    MCHC 34 2 05/28/2018     01/16/2019     05/29/2018     05/28/2018      Lab Results   Component Value Date    GLUCOSE 104 (H) 05/29/2018    GLUCOSE 281 (H) 05/28/2018    GLUCOSE 242 (H) 05/23/2018    CALCIUM 10 1 06/13/2022    CALCIUM 9 7 03/08/2022    CALCIUM 10 0 12/01/2021    AST 29 06/13/2022    AST 31 03/08/2022    AST 28 12/01/2021    ALT 21 06/13/2022    ALT 23 03/08/2022    ALT 24 12/01/2021    ALKPHOS 82 06/13/2022    ALKPHOS 97 03/08/2022    ALKPHOS 88 12/01/2021    PROT 7 1 05/28/2018    BILITOT 0 4 05/28/2018    MG 2 3 01/16/2019    MG 2 0 05/28/2018       Lab Results   Component Value Date    TROPONINI 0 07 (H) 05/29/2018    TROPONINI 0 08 (H) 05/28/2018    TROPONINI 0 12 (H) 05/28/2018       Results for orders placed or performed in visit on 09/15/22   POCT ECG    Narrative    Normal sinus rhythm with 1st degree AV block IN interval 268 milliseconds  Heart rate 68 beats per minute  Left axis deviation  Abnormal EKG           Current Outpatient Medications:     atenolol (TENORMIN) 50 mg tablet, Take 1 tablet (50 mg total) by mouth 2 (two) times a day, Disp: 180 tablet, Rfl: 3    BD Pen Needle Glory U/F 32G X 4 MM MISC, Inject as directed daily Test as directed, Disp: 100 each, Rfl: 3    Blood Glucose Monitoring Suppl (Accu-Chek Guide Me) w/Device KIT, USE DAILY AS DIRECTED, Disp: 1 kit, Rfl: 0    diltiazem (CARDIZEM CD) 120 mg 24 hr capsule, Take 1 capsule (120 mg total) by mouth daily, Disp: 90 capsule, Rfl: 3    Dulaglutide (Trulicity) 5 74 PX/8 6BW SOPN, Inject 0 5 mL (0 75 mg total) under the skin once a week, Disp: 5 mL, Rfl: 5    estradiol (ESTRACE) 0 1 mg/g vaginal cream, Insert 2 g into the vagina daily, Disp: , Rfl:     glucose blood (Accu-Chek Yelitza Plus) test strip, Use 1 each daily Use as instructed, Disp: 100 strip, Rfl: 3    hydrochlorothiazide (HYDRODIURIL) 12 5 mg tablet, Take 1 tablet (12 5 mg total) by mouth daily, Disp: 90 tablet, Rfl: 3    hydrOXYzine HCL (ATARAX) 25 mg tablet, TAKE 1 TABLET TWICE DAILY AS NEEDED FOR ANXIETY, Disp: 60 tablet, Rfl: 3    insulin detemir (Levemir FlexTouch) 100 Units/mL injection pen, Inject 10 Units under the skin daily at bedtime, Disp: 15 mL, Rfl: 3    Lancets (accu-chek multiclix) lancets, Use daily Use as instructed, Disp: 100 each, Rfl: 3    lisinopril (ZESTRIL) 20 mg tablet, Take 1 tablet (20 mg total) by mouth daily, Disp: 90 tablet, Rfl: 3    MICROLET LANCETS MISC, by Does not apply route daily, Disp: 50 each, Rfl: 3    rivaroxaban (Xarelto) 20 mg tablet, Take 1 tablet (20 mg total) by mouth daily with breakfast, Disp: 90 tablet, Rfl: 3    simvastatin (ZOCOR) 20 mg tablet, Take 1 tablet (20 mg total) by mouth daily, Disp: 90 tablet, Rfl: 3    Vitamin D, Cholecalciferol, 48 MCG (2000 UT) CAPS, Take 1 capsule by mouth daily, Disp: , Rfl:   Allergies   Allergen Reactions    No Active Allergies        Past Medical History:   Diagnosis Date    Anemia     IRON DEF ANEMIA DUE TO MENORRHAGIA    Diabetes mellitus ([place] [address] )     Hypercalcemia 10/06/2010    MILD  DECREASES HCTZ    Hypertension     Melanoma ([place] [address] ) 11/2011    IN SITU  MID BACK    Palpitations 05/28/2018    SINUS TACHYCARDIA  TROPONIN SPLL    Skin cancer 2017    NOSE     Social History     Socioeconomic History    Marital status: /Civil Union     Spouse name: Not on file    Number of children: Not on file    Years of education: Not on file    Highest education level: Not on file   Occupational History    Not on file   Tobacco Use    Smoking status: Never Smoker    Smokeless tobacco: Never Used   Vaping Use    Vaping Use: Never used   Substance and Sexual Activity    Alcohol use: No    Drug use: No    Sexual activity: Never   Other Topics Concern    Not on file   Social History Narrative    Not on file     Social Determinants of Health     Financial Resource Strain: Not on file   Food Insecurity: Not on file   Transportation Needs: Not on file   Physical Activity: Not on file   Stress: Not on file   Social Connections: Not on file   Intimate Partner Violence: Not on file   Housing Stability: Not on file      Family History   Problem Relation Age of Onset    Other Mother         TESTED(-)    Colon cancer Father 61    Prostate cancer Father 79    Factor V Leiden deficiency Daughter     Factor V Leiden deficiency Daughter     Breast cancer Sister         in her 63's    No Known Problems Maternal Grandmother     No Known Problems Maternal Grandfather     No Known Problems Paternal Grandmother     No Known Problems Paternal Grandfather     No Known Problems Sister     No Known Problems Sister     No Known Problems Maternal Aunt     No Known Problems Maternal Aunt     No Known Problems Maternal Aunt  No Known Problems Maternal Aunt     No Known Problems Maternal Aunt     No Known Problems Maternal Aunt     No Known Problems Maternal Aunt     No Known Problems Paternal Aunt     No Known Problems Paternal Aunt     No Known Problems Paternal Aunt      Past Surgical History:   Procedure Laterality Date    GALLBLADDER SURGERY  1991    HYSTERECTOMY  1995    UTERINE PROLAPSE VAGINAL POLYPS    HYSTERECTOMY      IRON REPLACEMENT    OTHER SURGICAL HISTORY  11/2011    EXCISION MELANOMA IN SITU MID BACK    PARATHYROIDECTOMY  05/21/2018       PREVIOUS WEIGHTS:   Wt Readings from Last 10 Encounters:   09/15/22 67 6 kg (149 lb)   07/26/22 67 4 kg (148 lb 9 6 oz)   06/22/22 67 8 kg (149 lb 6 4 oz)   04/20/22 66 7 kg (147 lb)   03/10/22 66 8 kg (147 lb 3 2 oz)   02/18/22 67 kg (147 lb 9 6 oz)   01/24/22 66 5 kg (146 lb 9 6 oz)   12/09/21 66 3 kg (146 lb 3 2 oz)   11/29/21 66 kg (145 lb 9 6 oz)   10/27/21 67 1 kg (148 lb)        Review of Systems:  Review of Systems   Respiratory: Negative for cough, choking, chest tightness, shortness of breath and wheezing  Cardiovascular: Negative for chest pain, palpitations and leg swelling  Musculoskeletal: Negative for gait problem  Skin: Negative for rash  Neurological: Negative for dizziness, tremors, syncope, weakness, light-headedness, numbness and headaches  Psychiatric/Behavioral: Negative for agitation and behavioral problems  The patient is not hyperactive  Physical Exam:  /74 (BP Location: Left arm, Patient Position: Sitting, Cuff Size: Adult)   Pulse 68   Ht 5' 2" (1 575 m)   Wt 67 6 kg (149 lb)   BMI 27 25 kg/m²     Physical Exam  Constitutional:       General: She is not in acute distress  Appearance: She is well-developed  HENT:      Head: Normocephalic and atraumatic  Neck:      Thyroid: No thyromegaly  Vascular: No carotid bruit or JVD  Trachea: No tracheal deviation     Cardiovascular:      Rate and Rhythm: Normal rate and regular rhythm  Pulses: Normal pulses  Heart sounds: Normal heart sounds  No murmur heard  No friction rub  No gallop  Pulmonary:      Effort: Pulmonary effort is normal  No respiratory distress  Breath sounds: Normal breath sounds  No wheezing, rhonchi or rales  Chest:      Chest wall: No tenderness  Abdominal:      Palpations: Abdomen is soft  Musculoskeletal:         General: Normal range of motion  Cervical back: Normal range of motion and neck supple  Right lower leg: No edema  Left lower leg: No edema  Skin:     General: Skin is warm and dry  Neurological:      General: No focal deficit present  Mental Status: She is alert and oriented to person, place, and time  Psychiatric:         Mood and Affect: Mood normal          Behavior: Behavior normal          Thought Content: Thought content normal          Judgment: Judgment normal        ======================================================  Imaging:   I have personally reviewed pertinent reports  Portions of the record may have been created with voice recognition software  Occasional wrong word or "sound a like" substitutions may have occurred due to the inherent limitations of voice recognition software  Read the chart carefully and recognize, using context, where substitutions have occurred      SIGNATURES:   Celsa Whatley MD

## 2022-10-02 DIAGNOSIS — E11.9 TYPE 2 DIABETES MELLITUS WITHOUT COMPLICATION, UNSPECIFIED WHETHER LONG TERM INSULIN USE (HCC): ICD-10-CM

## 2022-10-03 ENCOUNTER — CLINICAL SUPPORT (OUTPATIENT)
Dept: FAMILY MEDICINE CLINIC | Facility: CLINIC | Age: 87
End: 2022-10-03
Payer: COMMERCIAL

## 2022-10-03 DIAGNOSIS — Z23 ENCOUNTER FOR IMMUNIZATION: Primary | ICD-10-CM

## 2022-10-03 PROCEDURE — G0008 ADMIN INFLUENZA VIRUS VAC: HCPCS | Performed by: FAMILY MEDICINE

## 2022-10-03 PROCEDURE — 90662 IIV NO PRSV INCREASED AG IM: CPT | Performed by: FAMILY MEDICINE

## 2022-10-04 RX ORDER — BLOOD SUGAR DIAGNOSTIC
STRIP MISCELLANEOUS
Qty: 100 STRIP | Refills: 3 | Status: SHIPPED | OUTPATIENT
Start: 2022-10-04

## 2022-12-19 ENCOUNTER — APPOINTMENT (OUTPATIENT)
Dept: LAB | Facility: HOSPITAL | Age: 87
End: 2022-12-19

## 2022-12-19 DIAGNOSIS — Z79.4 TYPE 2 DIABETES MELLITUS WITH STAGE 1 CHRONIC KIDNEY DISEASE, WITH LONG-TERM CURRENT USE OF INSULIN (HCC): ICD-10-CM

## 2022-12-19 DIAGNOSIS — E11.22 TYPE 2 DIABETES MELLITUS WITH STAGE 1 CHRONIC KIDNEY DISEASE, WITH LONG-TERM CURRENT USE OF INSULIN (HCC): ICD-10-CM

## 2022-12-19 DIAGNOSIS — N18.1 TYPE 2 DIABETES MELLITUS WITH STAGE 1 CHRONIC KIDNEY DISEASE, WITH LONG-TERM CURRENT USE OF INSULIN (HCC): ICD-10-CM

## 2022-12-19 DIAGNOSIS — N18.31 STAGE 3A CHRONIC KIDNEY DISEASE (HCC): ICD-10-CM

## 2022-12-19 LAB
ALBUMIN SERPL BCP-MCNC: 4.1 G/DL (ref 3.5–5)
ALP SERPL-CCNC: 100 U/L (ref 43–122)
ALT SERPL W P-5'-P-CCNC: 23 U/L
ANION GAP SERPL CALCULATED.3IONS-SCNC: 6 MMOL/L (ref 5–14)
AST SERPL W P-5'-P-CCNC: 31 U/L (ref 14–36)
BILIRUB SERPL-MCNC: 0.64 MG/DL (ref 0.2–1)
BUN SERPL-MCNC: 17 MG/DL (ref 5–25)
CALCIUM SERPL-MCNC: 10.5 MG/DL (ref 8.4–10.2)
CHLORIDE SERPL-SCNC: 100 MMOL/L (ref 96–108)
CO2 SERPL-SCNC: 33 MMOL/L (ref 21–32)
CREAT SERPL-MCNC: 0.78 MG/DL (ref 0.6–1.2)
EST. AVERAGE GLUCOSE BLD GHB EST-MCNC: 174 MG/DL
GFR SERPL CREATININE-BSD FRML MDRD: 69 ML/MIN/1.73SQ M
GLUCOSE P FAST SERPL-MCNC: 169 MG/DL (ref 70–99)
HBA1C MFR BLD: 7.7 %
POTASSIUM SERPL-SCNC: 3.7 MMOL/L (ref 3.5–5.3)
PROT SERPL-MCNC: 8 G/DL (ref 6.4–8.4)
SODIUM SERPL-SCNC: 139 MMOL/L (ref 135–147)

## 2022-12-23 ENCOUNTER — RA CDI HCC (OUTPATIENT)
Dept: OTHER | Facility: HOSPITAL | Age: 87
End: 2022-12-23

## 2022-12-23 NOTE — PROGRESS NOTES
[facility]  coding [facility]       Chart reviewed, no opportunity found: CHART REVIEWED, NO OPPORTUNITY FOUND        Patients Insurance     Medicare Insurance: [facility]          This is a reminder to address all [facility] [address]  (risk adjustment) codes for the year 2023 as patient COLETTE scores reset to zero

## 2023-01-04 DIAGNOSIS — E78.49 OTHER HYPERLIPIDEMIA: ICD-10-CM

## 2023-01-04 RX ORDER — SIMVASTATIN 20 MG
TABLET ORAL
Qty: 90 TABLET | Refills: 3 | Status: SHIPPED | OUTPATIENT
Start: 2023-01-04

## 2023-01-11 ENCOUNTER — OFFICE VISIT (OUTPATIENT)
Dept: FAMILY MEDICINE CLINIC | Facility: CLINIC | Age: 88
End: 2023-01-11

## 2023-01-11 VITALS
HEART RATE: 74 BPM | BODY MASS INDEX: 26.58 KG/M2 | HEIGHT: 63 IN | SYSTOLIC BLOOD PRESSURE: 130 MMHG | TEMPERATURE: 97.2 F | DIASTOLIC BLOOD PRESSURE: 64 MMHG | OXYGEN SATURATION: 97 % | WEIGHT: 150 LBS

## 2023-01-11 DIAGNOSIS — N18.31 STAGE 3A CHRONIC KIDNEY DISEASE (HCC): ICD-10-CM

## 2023-01-11 DIAGNOSIS — Z79.4 TYPE 2 DIABETES MELLITUS WITH STAGE 3A CHRONIC KIDNEY DISEASE, WITH LONG-TERM CURRENT USE OF INSULIN (HCC): ICD-10-CM

## 2023-01-11 DIAGNOSIS — F41.9 ANXIETY: ICD-10-CM

## 2023-01-11 DIAGNOSIS — N18.31 TYPE 2 DIABETES MELLITUS WITH STAGE 3A CHRONIC KIDNEY DISEASE, WITH LONG-TERM CURRENT USE OF INSULIN (HCC): ICD-10-CM

## 2023-01-11 DIAGNOSIS — E78.2 MIXED HYPERLIPIDEMIA: ICD-10-CM

## 2023-01-11 DIAGNOSIS — M85.89 OSTEOPENIA OF MULTIPLE SITES: ICD-10-CM

## 2023-01-11 DIAGNOSIS — I48.0 PAROXYSMAL ATRIAL FIBRILLATION (HCC): ICD-10-CM

## 2023-01-11 DIAGNOSIS — Z78.0 POST-MENOPAUSAL: ICD-10-CM

## 2023-01-11 DIAGNOSIS — I72.8 SPLENIC ARTERY ANEURYSM (HCC): ICD-10-CM

## 2023-01-11 DIAGNOSIS — K21.9 GERD WITHOUT ESOPHAGITIS: Primary | ICD-10-CM

## 2023-01-11 DIAGNOSIS — R39.15 URINARY URGENCY: ICD-10-CM

## 2023-01-11 DIAGNOSIS — E11.22 TYPE 2 DIABETES MELLITUS WITH STAGE 3A CHRONIC KIDNEY DISEASE, WITH LONG-TERM CURRENT USE OF INSULIN (HCC): ICD-10-CM

## 2023-01-11 DIAGNOSIS — K59.00 CONSTIPATION, UNSPECIFIED CONSTIPATION TYPE: ICD-10-CM

## 2023-01-11 DIAGNOSIS — I10 BENIGN ESSENTIAL HYPERTENSION: ICD-10-CM

## 2023-01-11 RX ORDER — OMEGA-3S/DHA/EPA/FISH OIL 1000-1400
2 CAPSULE,DELAYED RELEASE (ENTERIC COATED) ORAL DAILY
Qty: 120 TABLET | Refills: 1 | Status: SHIPPED | OUTPATIENT
Start: 2023-01-11

## 2023-01-11 NOTE — PATIENT INSTRUCTIONS
CC:   Chief Complaint   Patient presents with   • Suspected Coronavirus (Covid-19)     Sore throat, runny nose, congestion and headache x 6 days        Established Patient     SUBJECTIVE  HPI:Diego Atwood is a 10 year old male who presents today with     Sinus Problem   This is a new problem. The current episode started in the past 7 days. Associated symptoms include congestion, coughing, headaches and a sore throat. Nothing aggravates the symptoms. He has tried nothing for the symptoms.       His past medical history is significant for:  No past medical history on file.    Allergies: ALLERGIES:  No Known Allergies    Current Outpatient Medications   Medication Sig Dispense Refill   • albuterol 108 (90 Base) MCG/ACT inhaler Take 1 to 2  inhalations every 4 hours as needed for cough/ wheezing 1 Inhaler 0   • montelukast (SINGULAIR) 5 MG chewable tablet Chew 1 tablet by mouth nightly. 90 tablet 3   • fluticasone (FLONASE) 50 MCG/ACT nasal spray Spray 2 sprays in each nostril daily. 16 g 2   • albuterol (VENTOLIN) (2.5 MG/3ML) 0.083% nebulizer solution Take 1 nebulizer treatment every 4 hours as needed 375 mL 0   • Pediatric Multivit-Minerals-C (EQ MULTIVITAMINS GUMMY CHILD PO)      • amoxicillin (AMOXIL) 400 MG/5ML suspension 1 1/4 tsp by mouth twice daily for 10 days 1 Bottle 0   • beclomethasone diprop HFA (QVAR REDIHALER) 80 MCG/ACT inhaler Inhale 2 puffs into the lungs 2 times daily. 1 Inhaler 11   • Respiratory Therapy Supplies (NEBULIZER MASK PEDIATRIC) Kit To use with the nebulizer as needed 1 kit 0   • cetirizine (ZYRTEC) 5 MG chewable tablet Chew 5 mg by mouth.       No current facility-administered medications for this visit.          Review of Systems:  Review of Systems   HENT: Positive for congestion and sore throat.    Respiratory: Positive for cough.    Neurological: Positive for headaches.   All other systems reviewed and are negative.      All other systems reviewed are  Purchase and start fiber gummies daily and aim for about 5g of fiber daily  Medicare Preventive Visit Patient Instructions  Thank you for completing your Welcome to Medicare Visit or Medicare Annual Wellness Visit today  Your next wellness visit will be due in one year (1/12/2024)  The screening/preventive services that you may require over the next 5-10 years are detailed below  Some tests may not apply to you based off risk factors and/or age  Screening tests ordered at today's visit but not completed yet may show as past due  Also, please note that scanned in results may not display below  Preventive Screenings:  Service Recommendations Previous Testing/Comments   Colorectal Cancer Screening  * Colonoscopy    * Fecal Occult Blood Test (FOBT)/Fecal Immunochemical Test (FIT)  * Fecal DNA/Cologuard Test  * Flexible Sigmoidoscopy Age: 39-70 years old   Colonoscopy: every 10 years (may be performed more frequently if at higher risk)  OR  FOBT/FIT: every 1 year  OR  Cologuard: every 3 years  OR  Sigmoidoscopy: every 5 years  Screening may be recommended earlier than age 39 if at higher risk for colorectal cancer  Also, an individualized decision between you and your healthcare provider will decide whether screening between the ages of 74-80 would be appropriate  Colonoscopy: 02/16/2017  FOBT/FIT: Not on file  Cologuard: Not on file  Sigmoidoscopy: Not on file    Screening Not Indicated     Breast Cancer Screening Age: 36 years old  Frequency: every 1-2 years  Not required if history of left and right mastectomy Mammogram: 04/20/2022    Screening Current   Cervical Cancer Screening Between the ages of 21-29, pap smear recommended once every 3 years  Between the ages of 33-67, can perform pap smear with HPV co-testing every 5 years     Recommendations may differ for women with a history of total hysterectomy, cervical cancer, or abnormal pap smears in past  Pap Smear: Not on file    Screening Not Indicated Hepatitis C Screening Once for adults born between 1945 and 1965  More frequently in patients at high risk for Hepatitis C Hep C Antibody: Not on file        Diabetes Screening 1-2 times per year if you're at risk for diabetes or have pre-diabetes Fasting glucose: 169 mg/dL (12/19/2022)  A1C: 7 7 % (12/19/2022)  Screening Not Indicated  History Diabetes   Cholesterol Screening Once every 5 years if you don't have a lipid disorder  May order more often based on risk factors  Lipid panel: 03/08/2022    Screening Not Indicated  History Lipid Disorder     Other Preventive Screenings Covered by Medicare:  Abdominal Aortic Aneurysm (AAA) Screening: covered once if your at risk  You're considered to be at risk if you have a family history of AAA  Lung Cancer Screening: covers low dose CT scan once per year if you meet all of the following conditions: (1) Age 50-69; (2) No signs or symptoms of lung cancer; (3) Current smoker or have quit smoking within the last 15 years; (4) You have a tobacco smoking history of at least 20 pack years (packs per day multiplied by number of years you smoked); (5) You get a written order from a healthcare provider  Glaucoma Screening: covered annually if you're considered high risk: (1) You have diabetes OR (2) Family history of glaucoma OR (3)  aged 48 and older OR (3)  American aged 72 and older  Osteoporosis Screening: covered every 2 years if you meet one of the following conditions: (1) You're estrogen deficient and at risk for osteoporosis based off medical history and other findings; (2) Have a vertebral abnormality; (3) On glucocorticoid therapy for more than 3 months; (4) Have primary hyperparathyroidism; (5) On osteoporosis medications and need to assess response to drug therapy  Last bone density test (DXA Scan): 09/09/2020  HIV Screening: covered annually if you're between the age of 12-76   Also covered annually if you are younger than 13 and older negative    OBJECTIVE  Vitals:   Visit Vitals  Pulse 91   Temp 97.6 °F (36.4 °C) (Tympanic)   Wt 34.7 kg (76 lb 9.6 oz)   SpO2 99%       Physical Exam:  Physical Exam  Vitals signs and nursing note reviewed.   Constitutional:       Appearance: He is well-developed.   HENT:      Right Ear: Tympanic membrane normal.      Left Ear: Tympanic membrane normal.      Nose: Congestion and rhinorrhea present.      Mouth/Throat:      Mouth: Mucous membranes are moist.      Pharynx: Oropharynx is clear.   Eyes:      Conjunctiva/sclera: Conjunctivae normal.   Neck:      Musculoskeletal: Normal range of motion and neck supple. No neck rigidity.   Cardiovascular:      Rate and Rhythm: Normal rate and regular rhythm.      Heart sounds: S1 normal and S2 normal.   Pulmonary:      Effort: Pulmonary effort is normal.      Breath sounds: Normal breath sounds and air entry.   Neurological:      Mental Status: He is alert.         ASSESSMENT/PLAN  Sinusitis-likely bacterial  Medications as ordered and side-effects discussed.   I had a discussion with patient/parent regarding his/her condition.  Discussed typical course of illness which usually lasts 5-10 days.  Recommend close observation and symptomatic therapy.  Patient should increase oral fluid intake to maintain hydration and take Tylenol/Motrin for fever and body aches.  If symptoms are not improving or worsening in anyway patient should seek immediate reevaluation either here in [place] or with PCP.  If  necessary, patient should also consider going to ER.  Patient/parent should specifically watch for symptoms of dehydration, increased lethargy,  persistent vomiting, chest tightness or shortness of breath.  Those symptoms should trigger immediate reevaluation.  Patient/parent verbalized understanding.      Maynor Remy MD  7/31/2020           than 65 with risk factors for HIV infection  For pregnant patients, it is covered up to 3 times per pregnancy  Immunizations:  Immunization Recommendations   Influenza Vaccine Annual influenza vaccination during flu season is recommended for all persons aged >= 6 months who do not have contraindications   Pneumococcal Vaccine   * Pneumococcal conjugate vaccine = PCV13 (Prevnar 13), PCV15 (Vaxneuvance), PCV20 (Prevnar 20)  * Pneumococcal polysaccharide vaccine = PPSV23 (Pneumovax) Adults 25-60 years old: 1-3 doses may be recommended based on certain risk factors  Adults 72 years old: 1-2 doses may be recommended based off what pneumonia vaccine you previously received   Hepatitis B Vaccine 3 dose series if at intermediate or high risk (ex: diabetes, end stage renal disease, liver disease)   Tetanus (Td) Vaccine - COST NOT COVERED BY MEDICARE PART B Following completion of primary series, a booster dose should be given every 10 years to maintain immunity against tetanus  Td may also be given as tetanus wound prophylaxis  Tdap Vaccine - COST NOT COVERED BY MEDICARE PART B Recommended at least once for all adults  For pregnant patients, recommended with each pregnancy  Shingles Vaccine (Shingrix) - COST NOT COVERED BY MEDICARE PART B  2 shot series recommended in those aged 48 and above     Health Maintenance Due:      Topic Date Due    DXA SCAN  09/09/2022     Immunizations Due:  There are no preventive care reminders to display for this patient  Advance Directives   What are advance directives? Advance directives are legal documents that state your wishes and plans for medical care  These plans are made ahead of time in case you lose your ability to make decisions for yourself  Advance directives can apply to any medical decision, such as the treatments you want, and if you want to donate organs  What are the types of advance directives?   There are many types of advance directives, and each state has rules about how to use them  You may choose a combination of any of the following:  Living will: This is a written record of the treatment you want  You can also choose which treatments you do not want, which to limit, and which to stop at a certain time  This includes surgery, medicine, IV fluid, and tube feedings  Durable power of  for healthcare [facility]): This is a written record that states who you want to make healthcare choices for you when you are unable to make them for yourself  This person, called a proxy, is usually a family member or a friend  You may choose more than 1 proxy  Do not resuscitate (DNR) order:  A DNR order is used in case your heart stops beating or you stop breathing  It is a request not to have certain forms of treatment, such as CPR  A DNR order may be included in other types of advance directives  Medical directive: This covers the care that you want if you are in a coma, near death, or unable to make decisions for yourself  You can list the treatments you want for each condition  Treatment may include pain medicine, surgery, blood transfusions, dialysis, IV or tube feedings, and a ventilator (breathing machine)  Values history: This document has questions about your views, beliefs, and how you feel and think about life  This information can help others choose the care that you would choose  Why are advance directives important? An advance directive helps you control your care  Although spoken wishes may be used, it is better to have your wishes written down  Spoken wishes can be misunderstood, or not followed  Treatments may be given even if you do not want them  An advance directive may make it easier for your family to make difficult choices about your care  Urinary Incontinence   Urinary incontinence (UI)  is when you lose control of your bladder  UI develops because your bladder cannot store or empty urine properly   The 3 most common types of UI are stress incontinence, urge incontinence, or both  Medicines:   May be given to help strengthen your bladder control  Report any side effects of medication to your healthcare provider  Do pelvic muscle exercises often:  Your pelvic muscles help you stop urinating  Squeeze these muscles tight for 5 seconds, then relax for 5 seconds  Gradually work up to squeezing for 10 seconds  Do 3 sets of 15 repetitions a day, or as directed  This will help strengthen your pelvic muscles and improve bladder control  Train your bladder:  Go to the bathroom at set times, such as every 2 hours, even if you do not feel the urge to go  You can also try to hold your urine when you feel the urge to go  For example, hold your urine for 5 minutes when you feel the urge to go  As that becomes easier, hold your urine for 10 minutes  Self-care:   Keep a UI record  Write down how often you leak urine and how much you leak  Make a note of what you were doing when you leaked urine  Drink liquids as directed  You may need to limit the amount of liquid you drink to help control your urine leakage  Do not drink any liquid right before you go to bed  Limit or do not have drinks that contain caffeine or alcohol  Prevent constipation  Eat a variety of high-fiber foods  Good examples are high-fiber cereals, beans, vegetables, and whole-grain breads  Walking is the best way to trigger your intestines to have a bowel movement  Exercise regularly and maintain a healthy weight  Weight loss and exercise will decrease pressure on your bladder and help you control your leakage  Use a catheter as directed  to help empty your bladder  A catheter is a tiny, plastic tube that is put into your bladder to drain your urine  Go to behavior therapy as directed  Behavior therapy may be used to help you learn to control your urge to urinate      Weight Management   Why it is important to manage your weight:  Being overweight increases your risk of health conditions such as heart disease, high blood pressure, type 2 diabetes, and certain types of cancer  It can also increase your risk for osteoarthritis, sleep apnea, and other respiratory problems  Aim for a slow, steady weight loss  Even a small amount of weight loss can lower your risk of health problems  How to lose weight safely:  A safe and healthy way to lose weight is to eat fewer calories and get regular exercise  You can lose up about 1 pound a week by decreasing the number of calories you eat by 500 calories each day  Healthy meal plan for weight management:  A healthy meal plan includes a variety of foods, contains fewer calories, and helps you stay healthy  A healthy meal plan includes the following:  Eat whole-grain foods more often  A healthy meal plan should contain fiber  Fiber is the part of grains, fruits, and vegetables that is not broken down by your body  Whole-grain foods are healthy and provide extra fiber in your diet  Some examples of whole-grain foods are whole-wheat breads and pastas, oatmeal, brown rice, and bulgur  Eat a variety of vegetables every day  Include dark, leafy greens such as spinach, kale, lisha greens, and mustard greens  Eat yellow and orange vegetables such as carrots, sweet potatoes, and winter squash  Eat a variety of fruits every day  Choose fresh or canned fruit (canned in its own juice or light syrup) instead of juice  Fruit juice has very little or no fiber  Eat low-fat dairy foods  Drink fat-free (skim) milk or 1% milk  Eat fat-free yogurt and low-fat cottage cheese  Try low-fat cheeses such as mozzarella and other reduced-fat cheeses  Choose meat and other protein foods that are low in fat  Choose beans or other legumes such as split peas or lentils  Choose fish, skinless poultry (chicken or turkey), or lean cuts of red meat (beef or pork)  Before you cook meat or poultry, cut off any visible fat  Use less fat and oil    Try baking foods instead of frying them  Add less fat, such as margarine, sour cream, regular salad dressing and mayonnaise to foods  Eat fewer high-fat foods  Some examples of high-fat foods include french fries, doughnuts, ice cream, and cakes  Eat fewer sweets  Limit foods and drinks that are high in sugar  This includes candy, cookies, regular soda, and sweetened drinks  Exercise:  Exercise at least 30 minutes per day on most days of the week  Some examples of exercise include walking, biking, dancing, and swimming  You can also fit in more physical activity by taking the stairs instead of the elevator or parking farther away from stores  Ask your healthcare provider about the best exercise plan for you  © Copyright [employer] 2018 Information is for End User's use only and may not be sold, redistributed or otherwise used for commercial purposes   All illustrations and images included in CareNotes® are the copyrighted property of [employer]  or [address] [place] [address]

## 2023-01-11 NOTE — ASSESSMENT & PLAN NOTE
Lab Results   Component Value Date    EGFR 69 12/19/2022    EGFR 61 06/13/2022    EGFR 60 03/08/2022    CREATININE 0 78 12/19/2022    CREATININE 0 87 06/13/2022    CREATININE 0 88 03/08/2022   stable  · Continue to monitor and avoid nephrotoxic medications and NSAIDS

## 2023-01-11 NOTE — ASSESSMENT & PLAN NOTE
Lab Results   Component Value Date    HGBA1C 7 7 (H) 12/19/2022     Slightly elevated  · Was 7 0 6/13/22  · Currently on levemir 15K qhs, and trulicity 4 25 weekly  · Due to cost will hold off on adjustments to medications and recheck a1c in 3 monhts  · -170, average 130  · Patient is up to date with diabetic eye exam and foot exam

## 2023-01-11 NOTE — PROGRESS NOTES
Assessment and Plan:     Problem List Items Addressed This Visit        Digestive    GERD without esophagitis - Primary     Stable  Diet controlled            Endocrine    Type 2 diabetes mellitus with stage 3a chronic kidney disease, with long-term current use of insulin ([facility] )       Lab Results   Component Value Date    HGBA1C 7 7 (H) 12/19/2022     Slightly elevated  Was 7 0 6/13/22  Currently on levemir 40B qhs, and trulicity 9 78 weekly  Due to cost will hold off on adjustments to medications and recheck a1c in 3 monhts  -170, average 130  Patient is up to date with diabetic eye exam and foot exam          Relevant Orders    Glucometer    Glucometer test strips    Lancets    HEMOGLOBIN A1C W/ EAG ESTIMATION       Cardiovascular and Mediastinum    Benign essential hypertension     Well controlled  bp is 130/64  continue current regimen         Paroxysmal atrial fibrillation ([facility] [address] [address] )     Stable  Follows with cardiology, Dr Maria Elena Garza  Last [address] 9/2022- no change continue current regimen  Rate controlled on diltiazem and atenolol  Anticoagulated on xarelto 20mg          Splenic artery aneurysm ([facility] [address] )     Stable  Follows with vascular  Due for repeat VAS 10/26/2023 - order is in patient;'s chart            Musculoskeletal and Integument    Osteopenia of multiple sites     Patient is due for repeat DEXA         Relevant Orders    DXA bone density spine hip and pelvis    PTH, intact    Vitamin D 25 hydroxy       Genitourinary    Stage 3a chronic kidney disease ([facility] [address] )     Lab Results   Component Value Date    EGFR 69 12/19/2022    EGFR 61 06/13/2022    EGFR 60 03/08/2022    CREATININE 0 78 12/19/2022    CREATININE 0 87 06/13/2022    CREATININE 0 88 03/08/2022   stable  Continue to monitor and avoid nephrotoxic medications and NSAIDS         Relevant Orders    Comprehensive metabolic panel    Microalbumin / creatinine urine ratio       Other    Mixed hyperlipidemia     Well controlled on simvastatin 20mg         Relevant Orders    Lipid panel    Anxiety     Stable  Patient does not require any medications for this  Continue to monitor         Urinary urgency     Much improved  No further episodes        Other Visit Diagnoses     Post-menopausal        Relevant Orders    DXA bone density spine hip and pelvis    Constipation, unspecified constipation type        Relevant Medications    Fiber Adult Gummies 2 g CHEW        BMI Counseling: Body mass index is 26 57 kg/m²  The BMI is above normal  Nutrition recommendations include decreasing portion sizes, encouraging healthy choices of fruits and vegetables, decreasing fast food intake, limiting drinks that contain sugar, moderation in carbohydrate intake, reducing intake of saturated and trans fat and reducing intake of cholesterol  Exercise recommendations include moderate physical activity 150 minutes/week and exercising 3-5 times per week  Rationale for BMI follow-up plan is due to patient being overweight or obese  Depression Screening and Follow-up Plan: Patient was screened for depression during today's encounter  They screened negative with a PHQ-2 score of 0  Urinary Incontinence Plan of Care: counseling topics discussed: use restroom every 2 hours, keeping a bladder diary, limiting fluid intake 3-4 hours before bed and limiting fluid intake to 60 oz  per day  Preventive health issues were discussed with patient, and age appropriate screening tests were ordered as noted in patient's After Visit Summary  Personalized health advice and appropriate referrals for health education or preventive services given if needed, as noted in patient's After Visit Summary  History of Present Illness:     Patient presents for a Medicare Wellness Visit and routine follow up  Patient has no concerns today      -170    HPI   Patient Care Team:  Lazarus Dustman, DO as PCP - General (Family Medicine)  Ulysses Robbins MD as PCP - PCP-[facility] Attributed-Dima Rodriguez MD (Cardiology)     Review of Systems:     Review of Systems   Constitutional: Negative for activity change, chills, diaphoresis and fever  HENT: Negative for ear pain, hearing loss, postnasal drip, rhinorrhea, sinus pressure, sinus pain, sneezing and sore throat  Respiratory: Negative for cough, chest tightness, shortness of breath and wheezing  Cardiovascular: Negative for chest pain, palpitations and leg swelling  Gastrointestinal: Negative for abdominal pain, blood in stool, constipation, diarrhea, nausea and vomiting  Genitourinary: Negative for dysuria, frequency, hematuria and urgency  Musculoskeletal: Negative for arthralgias and myalgias  Neurological: Negative for dizziness, syncope, weakness, light-headedness, numbness and headaches          Problem List:     Patient Active Problem List   Diagnosis   • Benign essential hypertension   • Prolapse of anterior vaginal wall   • Fibrocystic breast disease   • Hypercalcemia   • Incontinence   • Kyphosis   • Microproteinuria   • Ventricular premature depolarization   • Tinnitus   • Paroxysmal atrial fibrillation (HCC)   • Mixed hyperlipidemia   • Type 2 diabetes mellitus with stage 3a chronic kidney disease, with long-term current use of insulin (HCC)   • Splenic artery aneurysm (HCC)   • Thyroid nodule   • Vitamin D deficiency   • BMI 28 0-28 9,adult   • Osteopenia of multiple sites   • Stage 3a chronic kidney disease (HCC)   • Dysequilibrium   • Anxiety   • Hypoalbuminemia   • Adjustment disorder with anxious mood   • Jay-Walker grade 2 cystocele   • Type 2 diabetes mellitus with hyperglycemia ([facility])   • GERD without esophagitis   • Urinary urgency   • Other constipation   • Dysuria      Past Medical and Surgical History:     Past Medical History:   Diagnosis Date   • Anemia     IRON DEF ANEMIA DUE TO MENORRHAGIA   • Diabetes mellitus ([facility] )    • Hypercalcemia 10/06/2010    MILD  DECREASES HCTZ   • Hypertension • Melanoma ([facility] ) 11/2011    IN SITU  MID BACK   • Palpitations 05/28/2018    SINUS TACHYCARDIA  TROPONIN SPLL   • Skin cancer 2017    NOSE     Past Surgical History:   Procedure Laterality Date   • GALLBLADDER SURGERY  1991   • HYSTERECTOMY  1995    UTERINE PROLAPSE VAGINAL POLYPS   • HYSTERECTOMY      IRON REPLACEMENT   • OTHER SURGICAL HISTORY  11/2011    EXCISION MELANOMA IN SITU MID BACK   • PARATHYROIDECTOMY  05/21/2018      Family History:     Family History   Problem Relation Age of Onset   • Other Mother         TESTED(-)   • Colon cancer Father 61   • Prostate cancer Father 79   • Factor V Leiden deficiency Daughter    • Factor V Leiden deficiency Daughter    • Breast cancer Sister         in her 63's   • No Known Problems Maternal Grandmother    • No Known Problems Maternal Grandfather    • No Known Problems Paternal Grandmother    • No Known Problems Paternal Grandfather    • No Known Problems Sister    • No Known Problems Sister    • No Known Problems Maternal Aunt    • No Known Problems Maternal Aunt    • No Known Problems Maternal Aunt    • No Known Problems Maternal Aunt    • No Known Problems Maternal Aunt    • No Known Problems Maternal Aunt    • No Known Problems Maternal Aunt    • No Known Problems Paternal Aunt    • No Known Problems Paternal Aunt    • No Known Problems Paternal Aunt       Social History:     Social History     Socioeconomic History   • Marital status: /Civil Union     Spouse name: None   • Number of children: None   • Years of education: None   • Highest education level: None   Occupational History   • None   Tobacco Use   • Smoking status: Never   • Smokeless tobacco: Never   Vaping Use   • Vaping Use: Never used   Substance and Sexual Activity   • Alcohol use: No   • Drug use: No   • Sexual activity: Never   Other Topics Concern   • None   Social History Narrative   • None     Social Determinants of Health     Financial Resource Strain: Low Risk    • Difficulty of Paying Living Expenses: Not hard at all   Food Insecurity: Not on file   Transportation Needs: No Transportation Needs   • Lack of Transportation (Medical): No   • Lack of Transportation (Non-Medical):  No   Physical Activity: Not on file   Stress: Not on file   Social Connections: Not on file   Intimate Partner Violence: Not on file   Housing Stability: Not on file      Medications and Allergies:     Current Outpatient Medications   Medication Sig Dispense Refill   • atenolol (TENORMIN) 50 mg tablet Take 1 tablet (50 mg total) by mouth 2 (two) times a day 180 tablet 3   • BD Pen Needle Glory U/F 32G X 4 MM MISC Inject as directed daily Test as directed 100 each 3   • diltiazem (CARDIZEM CD) 120 mg 24 hr capsule Take 1 capsule (120 mg total) by mouth daily 90 capsule 3   • Dulaglutide (Trulicity) 4 76 BZ/0 8ZW SOPN Inject 0 5 mL (0 75 mg total) under the skin once a week 5 mL 5   • estradiol (ESTRACE) 0 1 mg/g vaginal cream Insert 2 g into the vagina daily     • Fiber Adult Gummies 2 g CHEW Chew 2 each in the morning 120 tablet 1   • hydrochlorothiazide (HYDRODIURIL) 12 5 mg tablet Take 1 tablet (12 5 mg total) by mouth daily 90 tablet 3   • hydrOXYzine HCL (ATARAX) 25 mg tablet TAKE 1 TABLET TWICE DAILY AS NEEDED FOR ANXIETY 60 tablet 3   • insulin detemir (Levemir FlexTouch) 100 Units/mL injection pen Inject 10 Units under the skin daily at bedtime 15 mL 3   • Lancets (accu-chek multiclix) lancets Use daily Use as instructed 100 each 3   • lisinopril (ZESTRIL) 20 mg tablet Take 1 tablet (20 mg total) by mouth daily 90 tablet 3   • MICROLET LANCETS MISC by Does not apply route daily 50 each 3   • rivaroxaban (Xarelto) 20 mg tablet Take 1 tablet (20 mg total) by mouth daily with breakfast 90 tablet 3   • simvastatin (ZOCOR) 20 mg tablet TAKE 1 TABLET BY MOUTH EVERY DAY 90 tablet 3   • Vitamin D, Cholecalciferol, 50 MCG (2000 UT) CAPS Take 1 capsule by mouth daily       No current facility-administered medications for this visit  Allergies   Allergen Reactions   • No Active Allergies       Immunizations:     Immunization History   Administered Date(s) Administered   • COVID-19 MODERNA VACC 0 5 ML IM 01/14/2021, 02/11/2021, 10/27/2021, 04/05/2022, 10/10/2022   • H1N1, All Formulations 12/22/2009   • INFLUENZA 10/12/2007, 10/16/2008, 10/15/2009, 09/23/2015, 11/16/2016, 11/16/2016, 10/04/2017   • Influenza Split 10/06/2010, 10/11/2011, 10/27/2012, 10/08/2013   • Influenza Split High Dose Preservative Free IM 09/25/2014, 10/04/2017   • Influenza, high dose seasonal 0 7 mL 10/15/2018, 10/22/2019, 09/14/2020, 10/18/2021, 10/03/2022   • Influenza, seasonal, injectable 10/16/2008, 10/15/2009, 09/23/2015   • Pneumococcal Conjugate 13-Valent 03/30/2015   • Pneumococcal Polysaccharide PPV23 12/05/1997, 10/06/2010   • Td (adult), adsorbed 04/12/1984, 07/18/2011   • Tdap 03/10/2022   • Zoster 04/08/2009, 04/08/2009   • Zoster Vaccine Recombinant 06/25/2019, 09/30/2019      Health Maintenance:         Topic Date Due   • DXA SCAN  09/09/2022     There are no preventive care reminders to display for this patient  Medicare Screening Tests and Risk Assessments:     Regulo Ibrahim is here for her Subsequent Wellness visit  Last Medicare Wellness visit information reviewed, patient interviewed and updates made to the record today  Health Risk Assessment:   Patient rates overall health as good  Patient feels that their physical health rating is slightly better  Patient is satisfied with their life  Eyesight was rated as same  Hearing was rated as same  Patient feels that their emotional and mental health rating is slightly better  Patients states they are sometimes angry  Patient states they are sometimes unusually tired/fatigued  Pain experienced in the last 7 days has been none  Patient states that she has experienced no weight loss or gain in last 6 months  Depression Screening:   PHQ-2 Score: 0      Fall Risk Screening:    In the past year, patient has experienced: no history of falling in past year      Urinary Incontinence Screening:   Patient has leaked urine accidently in the last six months  Home Safety:  Patient does not have trouble with stairs inside or outside of their home  Patient has working smoke alarms and has no working carbon monoxide detector  Home safety hazards include: none  Nutrition:   Current diet is Regular  Medications:   Patient is currently taking over-the-counter supplements  OTC medications include: see medication list  Patient is able to manage medications  Activities of Daily Living (ADLs)/Instrumental Activities of Daily Living (IADLs):   Walk and transfer into and out of bed and chair?: Yes  Dress and groom yourself?: Yes    Bathe or shower yourself?: Yes    Feed yourself? Yes  Do your laundry/housekeeping?: Yes  Manage your money, pay your bills and track your expenses?: Yes  Make your own meals?: Yes    Do your own shopping?: Yes    Previous Hospitalizations:   Any hospitalizations or ED visits within the last 12 months?: No      Advance Care Planning:   Living will: Yes    Durable POA for healthcare:  Yes    Advanced directive: Yes    Advanced directive counseling given: Yes    Five wishes given: No    End of Life Decisions reviewed with patient: Yes    Provider agrees with end of life decisions: Yes      Cognitive Screening:   Provider or family/friend/caregiver concerned regarding cognition?: No    PREVENTIVE SCREENINGS      Cardiovascular Screening:    General: Screening Not Indicated and History Lipid Disorder      Diabetes Screening:     General: Screening Not Indicated and History Diabetes      Colorectal Cancer Screening:     General: Screening Not Indicated      Breast Cancer Screening:     General: Screening Current      Cervical Cancer Screening:    General: Screening Not Indicated      Osteoporosis Screening:    General: Risks and Benefits Discussed    Due for: Bone Density Ultrasound Abdominal Aortic Aneurysm (AAA) Screening:        General: Screening Not Indicated      Lung Cancer Screening:     General: Screening Not Indicated      Hepatitis C Screening:    General: Screening Current    Screening, Brief Intervention, and Referral to Treatment (SBIRT)    Screening  Typical number of drinks in a day: 0  Typical number of drinks in a week: 0  Interpretation: Low risk drinking behavior  Single Item Drug Screening:  How often have you used an illegal drug (including marijuana) or a prescription medication for non-medical reasons in the past year? never    Single Item Drug Screen Score: 0  Interpretation: Negative screen for possible drug use disorder    Other Counseling Topics:   Car/seat belt/driving safety, skin self-exam, sunscreen and calcium and vitamin D intake and regular weightbearing exercise  No results found  Physical Exam:     /64 (BP Location: Left arm, Patient Position: Sitting, Cuff Size: Large)   Pulse 74   Temp (!) 97 2 °F (36 2 °C) (Temporal)   Ht 5' 3" (1 6 m)   Wt 68 kg (150 lb)   SpO2 97%   BMI 26 57 kg/m²     Physical Exam  Constitutional:       General: She is not in acute distress  Appearance: She is well-developed  She is not diaphoretic  HENT:      Head: Normocephalic and atraumatic  Right Ear: Tympanic membrane, ear canal and external ear normal  There is no impacted cerumen  Left Ear: Tympanic membrane, ear canal and external ear normal  There is no impacted cerumen  Nose: Nose normal  No congestion  Mouth/Throat:      Mouth: Mucous membranes are moist       Pharynx: Oropharynx is clear  No oropharyngeal exudate  Eyes:      Conjunctiva/sclera: Conjunctivae normal       Pupils: Pupils are equal, round, and reactive to light  Neck:      Vascular: No JVD  Cardiovascular:      Rate and Rhythm: Normal rate and regular rhythm  Heart sounds: Normal heart sounds  No murmur heard  No friction rub  No gallop  Pulmonary:      Effort: Pulmonary effort is normal  No respiratory distress  Breath sounds: Normal breath sounds  No wheezing or rales  Chest:      Chest wall: No tenderness  Abdominal:      General: Bowel sounds are normal  There is no distension  Palpations: Abdomen is soft  Tenderness: There is no abdominal tenderness  There is no guarding or rebound  Musculoskeletal:         General: No tenderness  Normal range of motion  Lymphadenopathy:      Cervical: No cervical adenopathy  Skin:     General: Skin is warm and dry  Neurological:      Mental Status: She is alert and oriented to person, place, and time  Cranial Nerves: No cranial nerve deficit  Psychiatric:         Mood and Affect: Mood normal          Behavior: Behavior normal          Thought Content:  Thought content normal          Judgment: Judgment normal           Federico Wang DO

## 2023-01-11 NOTE — ASSESSMENT & PLAN NOTE
Stable  · Follows with cardiology, Dr Kay Martin  · Last OV 9/2022- no change continue current regimen  · Rate controlled on diltiazem and atenolol  · Anticoagulated on xarelto 20mg

## 2023-03-14 ENCOUNTER — TELEMEDICINE (OUTPATIENT)
Dept: FAMILY MEDICINE CLINIC | Facility: CLINIC | Age: 88
End: 2023-03-14

## 2023-03-14 DIAGNOSIS — U07.1 COVID-19 VIRUS INFECTION: Primary | ICD-10-CM

## 2023-03-14 DIAGNOSIS — R39.15 URINARY URGENCY: ICD-10-CM

## 2023-03-14 LAB
AMORPH URATE CRY URNS QL MICRO: ABNORMAL
BACTERIA UR QL AUTO: ABNORMAL /HPF
BILIRUB UR QL STRIP: NEGATIVE
CLARITY UR: ABNORMAL
COLOR UR: ABNORMAL
GLUCOSE UR STRIP-MCNC: ABNORMAL MG/DL
HGB UR QL STRIP.AUTO: NEGATIVE
HYALINE CASTS #/AREA URNS LPF: ABNORMAL /LPF
KETONES UR STRIP-MCNC: NEGATIVE MG/DL
LEUKOCYTE ESTERASE UR QL STRIP: ABNORMAL
NITRITE UR QL STRIP: NEGATIVE
NON-SQ EPI CELLS URNS QL MICRO: ABNORMAL /HPF
PH UR STRIP.AUTO: 6.5 [PH]
PROT UR STRIP-MCNC: NEGATIVE MG/DL
RBC #/AREA URNS AUTO: ABNORMAL /HPF
SL AMB  POCT GLUCOSE, UA: NEGATIVE
SL AMB LEUKOCYTE ESTERASE,UA: 125
SL AMB POCT BILIRUBIN,UA: NEGATIVE
SL AMB POCT BLOOD,UA: ABNORMAL
SL AMB POCT CLARITY,UA: ABNORMAL
SL AMB POCT COLOR,UA: YELLOW
SL AMB POCT KETONES,UA: 5
SL AMB POCT NITRITE,UA: NEGATIVE
SL AMB POCT PH,UA: 6.5
SL AMB POCT SPECIFIC GRAVITY,UA: 1.01
SL AMB POCT URINE PROTEIN: 15
SL AMB POCT UROBILINOGEN: NEGATIVE
SP GR UR STRIP.AUTO: 1.01 (ref 1–1.03)
UROBILINOGEN UR STRIP-ACNC: <2 MG/DL
WBC #/AREA URNS AUTO: ABNORMAL /HPF

## 2023-03-14 RX ORDER — NITROFURANTOIN 25; 75 MG/1; MG/1
100 CAPSULE ORAL 2 TIMES DAILY
Qty: 10 CAPSULE | Refills: 0 | Status: SHIPPED | OUTPATIENT
Start: 2023-03-14 | End: 2023-03-19

## 2023-03-14 NOTE — PROGRESS NOTES
COVID-19 Outpatient Progress Note    Assessment/Plan:    Problem List Items Addressed This Visit        Other    Urinary urgency     recurrent  Waxes and wanes  POCT urine dip: 125 leuks, blood and protein  Follow up urine culture  Start macrobid         Relevant Medications    nitrofurantoin (MACROBID) 100 mg capsule    Other Relevant Orders    POCT urine dip (Completed)    UA (URINE) with reflex to Scope    Urine culture    COVID-19 virus infection - Primary     Resolving  Symptom onset 3/4/2023  Tested + 3/11/2023  Denies shortness of breath or cough  Has been afebrile  No longer in window for monoclonal antibody treatment  Continue symptomatic care and follow-up as needed           Disposition:     Patient has asymptomatic or mild COVID-19 infection  Based off CDC guidelines, they were recommended to isolate for 5 days  If they are asymptomatic or symptoms are improving with no fevers in the past 24 hours, isolation may be ended followed by 5 days of wearing a mask when around othes to minimize risk of infecting others  If still have a fever or other symptoms have not improved, continue to isolate until they improve  Regardless of when they end isolation, avoid being around people who are more likely to get very sick from COVID-19 until at least day 11  I have spent a total time of 15 minutes on the day of the encounter for this patient including discussing diagnostic results, discussing prognosis, risks and benefits of treatment options, patient and family education and impressions         Encounter provider: Marciano Siemens, DO     Provider located at: [address] [place] [id number]     Recent Visits  Date Type Provider Dept   03/13/23 Telephone [place] [id number]   Showing recent visits within past 7 days and meeting all other requirements  Today's Visits  Date Type Provider Dept 03/14/23 Telemedicine Federico Palomares, DO [address]    Showing today's visits and meeting all other requirements  Future Appointments  No visits were found meeting these conditions  Showing future appointments within next 150 days and meeting all other requirements     This virtual check-in was done via telephone and she agrees to proceed  Patient agrees to participate in a virtual check in via telephone or video visit instead of presenting to the office to address urgent/immediate medical needs  Patient is aware this is a billable service  She acknowledged consent and understanding of privacy and security of the video platform  The patient has agreed to participate and understands they can discontinue the visit at any time  After connecting through Telephone, the patient was identified by name and date of birth  Anahy Yanes was informed that this was a telemedicine visit and that the exam was being conducted confidentially over secure lines  My office door was closed  No one else was in the room  Anahy Yanes acknowledged consent and understanding of privacy and security of the telemedicine visit  I informed the patient that I have reviewed her record in Epic and presented the opportunity for her to ask any questions regarding the visit today  The patient agreed to participate  It was my intent to perform this visit via video technology but the patient was not able to do a video connection so the visit was completed via audio telephone only  Verification of patient location:  Patient is located in the following state in which I hold an active license: [place]    Subjective:   Anahy Yanes is a 82 y o  female who has been screened for COVID-19  Patient's symptoms include nasal congestion, rhinorrhea, anosmia, loss of taste and cough   Patient denies fever, chills, fatigue, malaise, sore throat, shortness of breath, chest tightness, abdominal pain, nausea, vomiting, diarrhea, myalgias and headaches  COVID-19 vaccination status: Fully vaccinated with booster    Admits to urinary pressure, urgency, and frequency  Denies burning  Lab Results   Component Value Date    SARSCOV2 Negative 03/14/2022    [facility] [facility] [address] Not Detected 01/31/2021       Review of Systems   Constitutional: Negative for activity change, chills, diaphoresis, fatigue and fever  HENT: Positive for congestion and rhinorrhea  Negative for ear pain, hearing loss, postnasal drip, sinus pressure, sinus pain, sneezing and sore throat  Respiratory: Positive for cough  Negative for chest tightness, shortness of breath and wheezing  Cardiovascular: Negative for chest pain, palpitations and leg swelling  Gastrointestinal: Negative for abdominal pain, blood in stool, constipation, diarrhea, nausea and vomiting  Genitourinary: Positive for frequency and urgency  Negative for dysuria and hematuria  Musculoskeletal: Negative for arthralgias and myalgias  Neurological: Negative for dizziness, syncope, weakness, light-headedness, numbness and headaches       Current Outpatient Medications on File Prior to Visit   Medication Sig   • atenolol (TENORMIN) 50 mg tablet Take 1 tablet (50 mg total) by mouth 2 (two) times a day   • BD Pen Needle Glory U/F 32G X 4 MM MISC Inject as directed daily Test as directed   • diltiazem (CARDIZEM CD) 120 mg 24 hr capsule Take 1 capsule (120 mg total) by mouth daily   • Dulaglutide (Trulicity) 6 38 /9 2NH SOPN Inject 0 5 mL (0 75 mg total) under the skin once a week   • estradiol (ESTRACE) 0 1 mg/g vaginal cream Insert 2 g into the vagina daily   • Fiber Adult Gummies 2 g CHEW Chew 2 each in the morning   • hydrochlorothiazide (HYDRODIURIL) 12 5 mg tablet Take 1 tablet (12 5 mg total) by mouth daily   • hydrOXYzine HCL (ATARAX) 25 mg tablet TAKE 1 TABLET TWICE DAILY AS NEEDED FOR ANXIETY   • insulin detemir (Levemir FlexTouch) 100 Units/mL injection pen Inject 10 Units under the skin daily at bedtime   • Lancets (accu-chek multiclix) lancets Use daily Use as instructed   • lisinopril (ZESTRIL) 20 mg tablet Take 1 tablet (20 mg total) by mouth daily   • MICROLET LANCETS MISC by Does not apply route daily   • rivaroxaban (Xarelto) 20 mg tablet Take 1 tablet (20 mg total) by mouth daily with breakfast   • simvastatin (ZOCOR) 20 mg tablet TAKE 1 TABLET BY MOUTH EVERY DAY   • Vitamin D, Cholecalciferol, 50 MCG (2000 UT) CAPS Take 1 capsule by mouth daily       Objective: There were no vitals taken for this visit           Michelle 55, DO

## 2023-03-14 NOTE — ASSESSMENT & PLAN NOTE
Resolving  · Symptom onset 3/4/2023  · Tested + 3/11/2023  · Denies shortness of breath or cough  · Has been afebrile  · No longer in window for monoclonal antibody treatment  · Continue symptomatic care and follow-up as needed

## 2023-03-14 NOTE — ASSESSMENT & PLAN NOTE
recurrent  · Waxes and wanes  · POCT urine dip: 125 leuks, blood and protein  · Follow up urine culture  · Start macrobid

## 2023-03-16 LAB — BACTERIA UR CULT: ABNORMAL

## 2023-03-27 ENCOUNTER — RA CDI HCC (OUTPATIENT)
Dept: OTHER | Facility: HOSPITAL | Age: 88
End: 2023-03-27

## 2023-03-27 ENCOUNTER — HOSPITAL ENCOUNTER (OUTPATIENT)
Dept: BONE DENSITY | Facility: CLINIC | Age: 88
Discharge: HOME | End: 2023-03-27

## 2023-03-27 DIAGNOSIS — M85.89 OSTEOPENIA OF MULTIPLE SITES: ICD-10-CM

## 2023-03-27 DIAGNOSIS — Z78.0 POST-MENOPAUSAL: ICD-10-CM

## 2023-03-27 DIAGNOSIS — Z13.820 ENCOUNTER FOR SCREENING FOR OSTEOPOROSIS: ICD-10-CM

## 2023-03-27 NOTE — PROGRESS NOTES
[address]  coding opportunities          Chart Reviewed number of suggestions sent to Provider: 1     Patients Insurance     Medicare Insurance: [employer]          [id number]

## 2023-04-04 ENCOUNTER — APPOINTMENT (OUTPATIENT)
Dept: LAB | Facility: HOSPITAL | Age: 88
End: 2023-04-04

## 2023-04-04 DIAGNOSIS — E11.22 TYPE 2 DIABETES MELLITUS WITH STAGE 3A CHRONIC KIDNEY DISEASE, WITH LONG-TERM CURRENT USE OF INSULIN (HCC): ICD-10-CM

## 2023-04-04 DIAGNOSIS — N18.31 TYPE 2 DIABETES MELLITUS WITH STAGE 3A CHRONIC KIDNEY DISEASE, WITH LONG-TERM CURRENT USE OF INSULIN (HCC): ICD-10-CM

## 2023-04-04 DIAGNOSIS — N18.31 STAGE 3A CHRONIC KIDNEY DISEASE (HCC): ICD-10-CM

## 2023-04-04 DIAGNOSIS — Z79.4 TYPE 2 DIABETES MELLITUS WITH STAGE 3A CHRONIC KIDNEY DISEASE, WITH LONG-TERM CURRENT USE OF INSULIN (HCC): ICD-10-CM

## 2023-04-04 DIAGNOSIS — M85.89 OSTEOPENIA OF MULTIPLE SITES: ICD-10-CM

## 2023-04-04 DIAGNOSIS — E78.2 MIXED HYPERLIPIDEMIA: ICD-10-CM

## 2023-04-04 LAB
25(OH)D3 SERPL-MCNC: 41.8 NG/ML (ref 30–100)
ALBUMIN SERPL BCP-MCNC: 3.8 G/DL (ref 3.5–5)
ALP SERPL-CCNC: 94 U/L (ref 34–104)
ALT SERPL W P-5'-P-CCNC: 16 U/L (ref 7–52)
ANION GAP SERPL CALCULATED.3IONS-SCNC: 6 MMOL/L (ref 4–13)
AST SERPL W P-5'-P-CCNC: 17 U/L (ref 13–39)
BILIRUB SERPL-MCNC: 0.6 MG/DL (ref 0.2–1)
BUN SERPL-MCNC: 17 MG/DL (ref 5–25)
CALCIUM SERPL-MCNC: 10.7 MG/DL (ref 8.4–10.2)
CHLORIDE SERPL-SCNC: 101 MMOL/L (ref 96–108)
CHOLEST SERPL-MCNC: 143 MG/DL
CO2 SERPL-SCNC: 33 MMOL/L (ref 21–32)
CREAT SERPL-MCNC: 0.84 MG/DL (ref 0.6–1.3)
CREAT UR-MCNC: 61.9 MG/DL
EST. AVERAGE GLUCOSE BLD GHB EST-MCNC: 166 MG/DL
GFR SERPL CREATININE-BSD FRML MDRD: 63 ML/MIN/1.73SQ M
GLUCOSE P FAST SERPL-MCNC: 111 MG/DL (ref 65–99)
HBA1C MFR BLD: 7.4 %
HDLC SERPL-MCNC: 57 MG/DL
LDLC SERPL CALC-MCNC: 73 MG/DL (ref 0–100)
MICROALBUMIN UR-MCNC: 31.4 MG/L (ref 0–20)
MICROALBUMIN/CREAT 24H UR: 51 MG/G CREATININE (ref 0–30)
NONHDLC SERPL-MCNC: 86 MG/DL
POTASSIUM SERPL-SCNC: 4.6 MMOL/L (ref 3.5–5.3)
PROT SERPL-MCNC: 6.3 G/DL (ref 6.4–8.4)
PTH-INTACT SERPL-MCNC: 93.9 PG/ML (ref 18.4–80.1)
SODIUM SERPL-SCNC: 140 MMOL/L (ref 135–147)
TRIGL SERPL-MCNC: 63 MG/DL

## 2023-04-10 PROBLEM — U07.1 COVID-19 VIRUS INFECTION: Status: RESOLVED | Noted: 2023-03-14 | Resolved: 2023-04-10

## 2023-04-14 LAB
LEFT EYE DIABETIC RETINOPATHY: NORMAL
RIGHT EYE DIABETIC RETINOPATHY: NORMAL

## 2023-04-24 NOTE — PATIENT INSTRUCTIONS
-recent abdominal ultrasound demonstrates stability of your small splenic artery aneurysm  Your aneurysm currently measures 1 17 cm, previously measuring 1 3 cm  No evidence of abdominal aortic aneurysm  Mesenteric arteries are all patent without areas of narrowing   -continue conservative management with surveillance    Will schedule abdominal ultrasound in 1 year  -continue Xarelto for atrial fibrillation  -return to office in 1 year with abdominal ultrasound for routine follow-up  -please contact the office with new symptoms or concerns
LABS and ADDITIONAL STUDIES:                        11.0   5.63  )-----------( 426      ( 24 Apr 2023 14:30 )             34.9     04-24    138  |  103  |  10  ----------------------------<  82  3.6   |  23  |  0.68    Ca    10.6<H>      24 Apr 2023 14:30  Mg     2.00     04-24    TPro  7.2  /  Alb  4.1  /  TBili  <0.2  /  DBili  x   /  AST  23  /  ALT  33  /  AlkPhos  103  04-24    LIVER FUNCTIONS - ( 24 Apr 2023 14:30 )  Alb: 4.1 g/dL / Pro: 7.2 g/dL / ALK PHOS: 103 U/L / ALT: 33 U/L / AST: 23 U/L / GGT: x           PT/INR - ( 24 Apr 2023 14:30 )   PT: 13.2 sec;   INR: 1.14 ratio     PTT - ( 24 Apr 2023 14:30 )  PTT:38.7 sec    < from: CT Abdomen and Pelvis w/ IV Cont (04.24.23 @ 18:48) >    FINDINGS:  LOWER CHEST: Bibasilar atelectasis. Right Bochdalek hernia.    LIVER: Within normal limits.  BILE DUCTS: Normal caliber.  GALLBLADDER: Within normal limits.  SPLEEN: Within normal limits.  PANCREAS: Within normal limits.  ADRENALS: Within normal limits.  KIDNEYS/URETERS: Within normal limits.    BLADDER: Within normal limits.  REPRODUCTIVE ORGANS: Fibroid uterus. No suspicious adnexal mass.    BOWEL: No bowel obstruction. Appendix is normal. Long segment colonic wall thickening, mucosal hyperenhancement and pericolonic stranding involving the distal transverse colon to the rectum.  PERITONEUM: No ascites.  VESSELS: Atherosclerotic changes.  RETROPERITONEUM/LYMPH NODES: Mild retroperitoneal and mesenteric lymphadenopathy.  ABDOMINAL WALL: Small periumbilical and umbilical hernias containing nonobstructed small bowel.  BONES: Degenerative changes.    IMPRESSION:  Long segment of acute proctocolitis from the distal transverse colon to the rectum, likely active inflammatory bowel disease in this patient with history of ulcerative colitis.    Mild mesenteric and retroperitoneal lymphadenopathy, which is likely reactive.    --- End of Report ---  < end of copied text >    EKG - NSR at 82, QTc 427, no significant ST-T findings

## 2023-05-08 ENCOUNTER — OFFICE VISIT (OUTPATIENT)
Dept: CARDIOLOGY CLINIC | Facility: CLINIC | Age: 88
End: 2023-05-08

## 2023-05-08 VITALS
DIASTOLIC BLOOD PRESSURE: 86 MMHG | OXYGEN SATURATION: 97 % | HEIGHT: 63 IN | BODY MASS INDEX: 26.22 KG/M2 | HEART RATE: 86 BPM | WEIGHT: 148 LBS | SYSTOLIC BLOOD PRESSURE: 150 MMHG

## 2023-05-08 DIAGNOSIS — I72.8 SPLENIC ARTERY ANEURYSM (HCC): ICD-10-CM

## 2023-05-08 DIAGNOSIS — Z79.4 TYPE 2 DIABETES MELLITUS WITH STAGE 3A CHRONIC KIDNEY DISEASE, WITH LONG-TERM CURRENT USE OF INSULIN (HCC): ICD-10-CM

## 2023-05-08 DIAGNOSIS — N18.31 TYPE 2 DIABETES MELLITUS WITH STAGE 3A CHRONIC KIDNEY DISEASE, WITH LONG-TERM CURRENT USE OF INSULIN (HCC): ICD-10-CM

## 2023-05-08 DIAGNOSIS — E11.22 TYPE 2 DIABETES MELLITUS WITH STAGE 3A CHRONIC KIDNEY DISEASE, WITH LONG-TERM CURRENT USE OF INSULIN (HCC): ICD-10-CM

## 2023-05-08 DIAGNOSIS — I10 BENIGN ESSENTIAL HYPERTENSION: ICD-10-CM

## 2023-05-08 DIAGNOSIS — N18.31 STAGE 3A CHRONIC KIDNEY DISEASE (HCC): ICD-10-CM

## 2023-05-08 DIAGNOSIS — E78.2 MIXED HYPERLIPIDEMIA: ICD-10-CM

## 2023-05-08 DIAGNOSIS — I48.0 PAROXYSMAL ATRIAL FIBRILLATION (HCC): Primary | ICD-10-CM

## 2023-05-08 NOTE — PROGRESS NOTES
[facility]  [id number] [facility]   [address]  [address]  Phone#  [phone]   Fax#  [phone]  *-*-*-*-*-*-*-*-*-*-*-*-*-*-*-*-*-*-*-*-*-*-*-*-*-*-*-*-*-*-*-*-*-*-*-*-*-*-*-*-*-*-*-*-*-*-*-*-*-*-*-*-*-*                                   Cardiology Follow Up      ENCOUNTER DATE: 23 10:41 AM  PATIENT NAME: Fabio Sanches   : 1935    MRN: [id number]  AGE:87 y o  SEX: female  [address] MD     PRIMARY CARE PHYSICIAN: Hamida Lujan DO    ACTIVE DIAGNOSIS THIS VISIT  1  Paroxysmal atrial fibrillation (HCC)        2  Mixed hyperlipidemia        3  Benign essential hypertension        4  Splenic artery aneurysm (HCC)        5  Stage 3a chronic kidney disease ([address] )        6  Type 2 diabetes mellitus with stage 3a chronic kidney disease, with long-term current use of insulin ([facility] [address] )          ACTIVE PROBLEM LIST  Patient Active Problem List   Diagnosis   • Benign essential hypertension   • Prolapse of anterior vaginal wall   • Fibrocystic breast disease   • Hypercalcemia   • Incontinence   • Kyphosis   • Microproteinuria   • Ventricular premature depolarization   • Tinnitus   • Paroxysmal atrial fibrillation (HCC)   • Mixed hyperlipidemia   • Type 2 diabetes mellitus with stage 3a chronic kidney disease, with long-term current use of insulin (HCC)   • Splenic artery aneurysm (HCC)   • Thyroid nodule   • Vitamin D deficiency   • BMI 28 0-28 9,adult   • Osteopenia of multiple sites   • Stage 3a chronic kidney disease (HCC)   • Dysequilibrium   • Anxiety   • Hypoalbuminemia   • Adjustment disorder with anxious mood   • Greenwood-Walker grade 2 cystocele   • Type 2 diabetes mellitus with hyperglycemia ([facility])   • GERD without esophagitis   • Urinary urgency   • Other constipation   • Dysuria       CARDIOLOGY SPECIALTY COMMENTS  Patient was 1st seen by us for new onset atrial fibrillation with RVR postop parathyroidectomy by Dr Marcio Justice   She is a 2nd admission for the same and at that time had a type 2 non ST elevation myocardial infarction  CT scan PE study was negative  Past medical history includes hypertension, hyperlipidemia and diabetes mellitus  05/29/2018 stress test: Negative Persantine stress test  LVEF 77%, hyperdynamic ventricle  Normal tomographic perfusion series  05/23/2018 echocardiogram: Normal left ventricular systolic function, EF 62-05%  Intermediate left ventricular diastolic function  Normal left ventricular filling pressures  Mild-to-moderate left atrial enlargement  Aortic sclerosis without stenosis  05/14/2020 ambulatory extended monitor  Agree with preliminary report except: There is one short 12 beat run of SVT  Although some P waves are noted, there is also a short area of irregularity without defined P waves which may be atrial fibrillation  11/11/2020 Holter monitor: Average heart rate 66 bpm, heart rate range 56 to 86 bpm  Rare ventricular and supraventricular ectopics  No significant pauses  No atrial fibrillation  INTERVAL HISTORY:        Patient denies cardiac symptoms  Patient denies chest discomfort or shortness of breath  Patient has no palpitations  Patient denies symptoms of dizziness, lightheadedness or near-syncope/syncope  Patient denies leg edema  Patient denies symptoms of orthopnea or paroxysmal nocturnal dyspnea      Her blood pressure was a little high today but a month ago she was seen in a different doctor's office and it was normal   Her cholesterol is excellent with a triglycerides of 63 and an LDL calculated of 73    DISCUSSION/PLAN:          Return in 6 months  EKG on return    Lab Studies:    Lab Results   Component Value Date    CHOLESTEROL 143 04/04/2023    CHOLESTEROL 139 03/08/2022    CHOLESTEROL 143 07/28/2021     Lab Results   Component Value Date    TRIG 63 04/04/2023    TRIG 59 03/08/2022    TRIG 53 07/28/2021     Lab Results   Component Value Date    HDL 57 04/04/2023    HDL 57 03/08/2022    HDL 58 07/28/2021     Lab Results   Component Value Date    LDLCALC 73 04/04/2023    LDLCALC 70 03/08/2022    LDLCALC 74 07/28/2021     Lab Results   Component Value Date    NONHDL 93 06/18/2019    NONHDL 78 12/03/2018    NONHDL 95 02/01/2018       Lab Results   Component Value Date    HGBA1C 7 4 (H) 04/04/2023      Lab Results   Component Value Date    EGFR 63 04/04/2023    EGFR 69 12/19/2022    EGFR 61 06/13/2022    SODIUM 140 04/04/2023    SODIUM 139 12/19/2022    SODIUM 139 06/13/2022    K 4 6 04/04/2023    K 3 7 12/19/2022    K 3 9 06/13/2022     04/04/2023     12/19/2022     06/13/2022    CO2 33 (H) 04/04/2023    CO2 33 (H) 12/19/2022    CO2 31 (H) 06/13/2022    ANIONGAP 7 05/29/2018    ANIONGAP 12 05/28/2018    ANIONGAP 8 05/24/2018    BUN 17 04/04/2023    BUN 17 12/19/2022    BUN 19 06/13/2022    CREATININE 0 84 04/04/2023    CREATININE 0 78 12/19/2022    CREATININE 0 87 06/13/2022     Lab Results   Component Value Date    WBC 10 90 (H) 01/16/2019    WBC 9 2 05/29/2018    WBC 9 3 05/28/2018    HGB 13 6 01/16/2019    HGB 12 8 05/29/2018    HGB 13 1 05/28/2018    HCT 42 6 01/16/2019    HCT 38 4 05/29/2018    HCT 38 3 05/28/2018    MCV 93 01/16/2019    MCV 90 05/29/2018    MCV 91 05/28/2018    MCH 29 8 01/16/2019    MCH 30 1 05/29/2018    MCH 31 0 05/28/2018    MCHC 31 9 01/16/2019    MCHC 33 5 05/29/2018    MCHC 34 2 05/28/2018     01/16/2019     05/29/2018     05/28/2018      Lab Results   Component Value Date    GLUCOSE 104 (H) 05/29/2018    GLUCOSE 281 (H) 05/28/2018    GLUCOSE 242 (H) 05/23/2018    CALCIUM 10 7 (H) 04/04/2023    CALCIUM 10 5 (H) 12/19/2022    CALCIUM 10 1 06/13/2022    AST 17 04/04/2023    AST 31 12/19/2022    AST 29 06/13/2022    ALT 16 04/04/2023    ALT 23 12/19/2022    ALT 21 06/13/2022    ALKPHOS 94 04/04/2023    ALKPHOS 100 12/19/2022    ALKPHOS 82 06/13/2022    PROT 7 1 05/28/2018    BILITOT 0 4 05/28/2018    MG 2 3 01/16/2019    MG 2 0 05/28/2018 No results found for this visit on 05/08/23  Current Outpatient Medications:   •  atenolol (TENORMIN) 50 mg tablet, Take 1 tablet (50 mg total) by mouth 2 (two) times a day, Disp: 180 tablet, Rfl: 3  •  BD Pen Needle Glory U/F 32G X 4 MM MISC, Inject as directed daily Test as directed, Disp: 100 each, Rfl: 3  •  Blood Glucose Monitoring Suppl (OneTouch Verio Reflect) w/Device KIT, Check blood sugars once daily  Please substitute with appropriate alternative as covered by patient's insurance  Dx: E11 65, Disp: 1 kit, Rfl: 0  •  diltiazem (CARDIZEM CD) 120 mg 24 hr capsule, Take 1 capsule (120 mg total) by mouth daily, Disp: 90 capsule, Rfl: 3  •  Dulaglutide (Trulicity) 6 05 PO/6 8GV SOPN, Inject 0 5 mL (0 75 mg total) under the skin once a week, Disp: 5 mL, Rfl: 5  •  estradiol (ESTRACE) 0 1 mg/g vaginal cream, Insert 2 g into the vagina daily, Disp: , Rfl:   •  Fiber Adult Gummies 2 g CHEW, Chew 2 each in the morning, Disp: 120 tablet, Rfl: 1  •  glucose blood (OneTouch Verio) test strip, Check blood sugars once daily  Please substitute with appropriate alternative as covered by patient's insurance  Dx: E11 65, Disp: 100 each, Rfl: 3  •  hydrochlorothiazide (HYDRODIURIL) 12 5 mg tablet, Take 1 tablet (12 5 mg total) by mouth daily, Disp: 90 tablet, Rfl: 3  •  hydrOXYzine HCL (ATARAX) 25 mg tablet, TAKE 1 TABLET TWICE DAILY AS NEEDED FOR ANXIETY, Disp: 60 tablet, Rfl: 3  •  insulin detemir (Levemir FlexTouch) 100 Units/mL injection pen, Inject 10 Units under the skin daily at bedtime, Disp: 15 mL, Rfl: 3  •  Lancets (accu-chek multiclix) lancets, Use daily Use as instructed, Disp: 100 each, Rfl: 3  •  lisinopril (ZESTRIL) 20 mg tablet, Take 1 tablet (20 mg total) by mouth daily, Disp: 90 tablet, Rfl: 3  •  MICROLET LANCETS MISC, by Does not apply route daily, Disp: 50 each, Rfl: 3  •  OneTouch Delica Lancets 82K MISC, Check blood sugars once daily   Please substitute with appropriate alternative as covered by patient's insurance  Dx: E11 65, Disp: 100 each, Rfl: 3  •  rivaroxaban (Xarelto) 20 mg tablet, Take 1 tablet (20 mg total) by mouth daily with breakfast, Disp: 90 tablet, Rfl: 3  •  simvastatin (ZOCOR) 20 mg tablet, TAKE 1 TABLET BY MOUTH EVERY DAY, Disp: 90 tablet, Rfl: 3  •  Vitamin D, Cholecalciferol, 50 MCG (2000 UT) CAPS, Take 1 capsule by mouth daily, Disp: , Rfl:   Allergies   Allergen Reactions   • No Active Allergies        Past Medical History:   Diagnosis Date   • Anemia     IRON DEF ANEMIA DUE TO MENORRHAGIA   • [facility]    • Hypercalcemia 10/06/2010    MILD  DECREASES HCTZ   • Hypertension    • Melanoma ([facility] ) 11/2011    IN SITU  MID BACK   • Palpitations 05/28/2018    SINUS TACHYCARDIA  TROPONIN SPLL   • Skin cancer 2017    NOSE     Social History     Socioeconomic History   • Marital status: /Civil Union     Spouse name: Not on file   • Number of children: Not on file   • Years of education: Not on file   • Highest education level: Not on file   Occupational History   • Not on file   Tobacco Use   • Smoking status: Never   • Smokeless tobacco: Never   Vaping Use   • Vaping Use: Never used   Substance and Sexual Activity   • Alcohol use: No   • Drug use: No   • Sexual activity: Never   Other Topics Concern   • Not on file   Social History Narrative   • Not on file     Social Determinants of Health     Financial Resource Strain: Low Risk    • Difficulty of Paying Living Expenses: Not hard at all   Food Insecurity: Not on file   Transportation Needs: No Transportation Needs   • Lack of Transportation (Medical): No   • Lack of Transportation (Non-Medical):  No   Physical Activity: Not on file   Stress: Not on file   Social Connections: Not on file   Intimate Partner Violence: Not on file   Housing Stability: Not on file      Family History   Problem Relation Age of Onset   • Other Mother         TESTED(-)   • Colon cancer Father 61   • Prostate cancer Father 79   • Factor V Leiden "deficiency Daughter    • Factor V Leiden deficiency Daughter    • Breast cancer Sister         in her 63's   • No Known Problems Maternal Grandmother    • No Known Problems Maternal Grandfather    • No Known Problems Paternal Grandmother    • No Known Problems Paternal Grandfather    • No Known Problems Sister    • No Known Problems Sister    • No Known Problems Maternal Aunt    • No Known Problems Maternal Aunt    • No Known Problems Maternal Aunt    • No Known Problems Maternal Aunt    • No Known Problems Maternal Aunt    • No Known Problems Maternal Aunt    • No Known Problems Maternal Aunt    • No Known Problems Paternal Aunt    • No Known Problems Paternal Aunt    • No Known Problems Paternal Aunt      Past Surgical History:   Procedure Laterality Date   • GALLBLADDER SURGERY  1991   • HYSTERECTOMY  1995    UTERINE PROLAPSE VAGINAL POLYPS   • HYSTERECTOMY      IRON REPLACEMENT   • OTHER SURGICAL HISTORY  11/2011    EXCISION MELANOMA IN SITU MID BACK   • PARATHYROIDECTOMY  05/21/2018       PREVIOUS WEIGHTS:   Wt Readings from Last 10 Encounters:   05/08/23 67 1 kg (148 lb)   04/10/23 67 7 kg (149 lb 3 2 oz)   01/11/23 68 kg (150 lb)   09/15/22 67 6 kg (149 lb)   07/26/22 67 4 kg (148 lb 9 6 oz)   06/22/22 67 8 kg (149 lb 6 4 oz)   04/20/22 66 7 kg (147 lb)   03/10/22 66 8 kg (147 lb 3 2 oz)   02/18/22 67 kg (147 lb 9 6 oz)   01/24/22 66 5 kg (146 lb 9 6 oz)        Review of Systems:  Review of Systems   Respiratory: Negative for cough, choking, chest tightness, shortness of breath and wheezing  Cardiovascular: Negative for chest pain, palpitations and leg swelling  Musculoskeletal: Negative for gait problem  Skin: Negative for rash  Neurological: Negative for dizziness, tremors, syncope, weakness, light-headedness, numbness and headaches  Psychiatric/Behavioral: Negative for agitation and behavioral problems  The patient is not hyperactive          Physical Exam:  /86   Pulse 86   Ht 5' 3\" (1 6 " "m)   Wt 67 1 kg (148 lb)   SpO2 97%   BMI 26 22 kg/m²     Physical Exam  Constitutional:       General: She is not in acute distress  Appearance: She is well-developed  HENT:      Head: Normocephalic and atraumatic  Neck:      Thyroid: No thyromegaly  Vascular: No carotid bruit or JVD  Trachea: No tracheal deviation  Cardiovascular:      Rate and Rhythm: Normal rate and regular rhythm  Pulses: Normal pulses  Heart sounds: Normal heart sounds  No murmur heard  No friction rub  No gallop  Pulmonary:      Effort: Pulmonary effort is normal  No respiratory distress  Breath sounds: Normal breath sounds  No wheezing, rhonchi or rales  Chest:      Chest wall: No tenderness  Musculoskeletal:         General: Normal range of motion  Cervical back: Normal range of motion and neck supple  Right lower leg: No edema  Left lower leg: No edema  Skin:     General: Skin is warm and dry  Neurological:      General: No focal deficit present  Mental Status: She is alert and oriented to person, place, and time  Psychiatric:         Mood and Affect: Mood normal          Behavior: Behavior normal          Thought Content: Thought content normal          Judgment: Judgment normal          ======================================================  Imaging:   I have personally reviewed pertinent reports  I spent 30 minutes on the patient's office visit  This time was spent on the day of the visit  I had direct contact with the patient in the office on the day of the visit  Greater than 50% of the total time was spent obtaining a history, examining patient, answering all patient questions, arranging and discussing plan of care with patient as well as directly providing instructions  Additional time then spent on orders and office chart  Portions of the record may have been created with voice recognition software   Occasional wrong word or \"sound a like\" " substitutions may have occurred due to the inherent limitations of voice recognition software  Read the chart carefully and recognize, using context, where substitutions have occurred      SIGNATURES:   Hoang Fischer MD

## 2023-07-07 DIAGNOSIS — E11.65 TYPE 2 DIABETES MELLITUS WITH HYPERGLYCEMIA, WITH LONG-TERM CURRENT USE OF INSULIN (HCC): ICD-10-CM

## 2023-07-07 DIAGNOSIS — Z79.4 TYPE 2 DIABETES MELLITUS WITH HYPERGLYCEMIA, WITH LONG-TERM CURRENT USE OF INSULIN (HCC): ICD-10-CM

## 2023-07-07 RX ORDER — INSULIN DETEMIR 100 [IU]/ML
10 INJECTION, SOLUTION SUBCUTANEOUS
Qty: 15 ML | Refills: 3 | Status: SHIPPED | OUTPATIENT
Start: 2023-07-07

## 2023-07-13 DIAGNOSIS — I10 BENIGN ESSENTIAL HYPERTENSION: ICD-10-CM

## 2023-07-13 RX ORDER — ATENOLOL 50 MG/1
TABLET ORAL
Qty: 180 TABLET | Refills: 3 | Status: SHIPPED | OUTPATIENT
Start: 2023-07-13

## 2023-08-13 DIAGNOSIS — I10 BENIGN ESSENTIAL HYPERTENSION: ICD-10-CM

## 2023-08-13 DIAGNOSIS — I48.0 PAROXYSMAL ATRIAL FIBRILLATION (HCC): ICD-10-CM

## 2023-08-14 DIAGNOSIS — I48.91 ATRIAL FIBRILLATION, UNSPECIFIED TYPE (HCC): ICD-10-CM

## 2023-08-14 DIAGNOSIS — I10 BENIGN ESSENTIAL HYPERTENSION: ICD-10-CM

## 2023-08-14 RX ORDER — DILTIAZEM HYDROCHLORIDE 120 MG/1
120 CAPSULE, COATED, EXTENDED RELEASE ORAL DAILY
Qty: 90 CAPSULE | Refills: 3 | Status: SHIPPED | OUTPATIENT
Start: 2023-08-14

## 2023-08-14 RX ORDER — LISINOPRIL 20 MG/1
20 TABLET ORAL DAILY
Qty: 90 TABLET | Refills: 3 | Status: SHIPPED | OUTPATIENT
Start: 2023-08-14

## 2023-08-15 RX ORDER — ATENOLOL 50 MG/1
50 TABLET ORAL 2 TIMES DAILY
Qty: 180 TABLET | Refills: 3 | Status: SHIPPED | OUTPATIENT
Start: 2023-08-15

## 2023-08-15 RX ORDER — RIVAROXABAN 20 MG/1
20 TABLET, FILM COATED ORAL
Qty: 90 TABLET | Refills: 3 | Status: SHIPPED | OUTPATIENT
Start: 2023-08-15

## 2023-08-20 DIAGNOSIS — Z79.4 TYPE 2 DIABETES MELLITUS WITH STAGE 1 CHRONIC KIDNEY DISEASE, WITH LONG-TERM CURRENT USE OF INSULIN (HCC): ICD-10-CM

## 2023-08-20 DIAGNOSIS — N18.1 TYPE 2 DIABETES MELLITUS WITH STAGE 1 CHRONIC KIDNEY DISEASE, WITH LONG-TERM CURRENT USE OF INSULIN (HCC): ICD-10-CM

## 2023-08-20 DIAGNOSIS — E11.22 TYPE 2 DIABETES MELLITUS WITH STAGE 1 CHRONIC KIDNEY DISEASE, WITH LONG-TERM CURRENT USE OF INSULIN (HCC): ICD-10-CM

## 2023-08-21 RX ORDER — DULAGLUTIDE 0.75 MG/.5ML
0.75 INJECTION, SOLUTION SUBCUTANEOUS
Qty: 6 ML | Refills: 5 | Status: SHIPPED | OUTPATIENT
Start: 2023-08-21

## 2023-09-02 DIAGNOSIS — I10 BENIGN ESSENTIAL HYPERTENSION: ICD-10-CM

## 2023-09-05 RX ORDER — HYDROCHLOROTHIAZIDE 12.5 MG/1
12.5 TABLET ORAL DAILY
Qty: 90 TABLET | Refills: 3 | Status: SHIPPED | OUTPATIENT
Start: 2023-09-05 | End: 2023-09-06 | Stop reason: SDUPTHER

## 2023-09-06 DIAGNOSIS — I10 BENIGN ESSENTIAL HYPERTENSION: ICD-10-CM

## 2023-09-06 RX ORDER — HYDROCHLOROTHIAZIDE 12.5 MG/1
12.5 TABLET ORAL DAILY
Qty: 90 TABLET | Refills: 3 | Status: SHIPPED | OUTPATIENT
Start: 2023-09-06

## 2023-10-02 ENCOUNTER — RA CDI HCC (OUTPATIENT)
Dept: OTHER | Facility: HOSPITAL | Age: 88
End: 2023-10-02

## 2023-10-02 NOTE — PROGRESS NOTES
[address] [facility] coding opportunities       Chart reviewed, no opportunity found: CHART REVIEWED, NO OPPORTUNITY FOUND     Patients Insurance     Medicare Insurance: [employer]

## 2023-10-03 ENCOUNTER — APPOINTMENT (OUTPATIENT)
Dept: LAB | Facility: HOSPITAL | Age: 88
End: 2023-10-03
Payer: COMMERCIAL

## 2023-10-03 DIAGNOSIS — M85.89 OSTEOPENIA OF MULTIPLE SITES: ICD-10-CM

## 2023-10-03 DIAGNOSIS — I10 BENIGN ESSENTIAL HYPERTENSION: ICD-10-CM

## 2023-10-03 DIAGNOSIS — N18.31 TYPE 2 DIABETES MELLITUS WITH STAGE 3A CHRONIC KIDNEY DISEASE, WITH LONG-TERM CURRENT USE OF INSULIN (HCC): ICD-10-CM

## 2023-10-03 DIAGNOSIS — Z79.4 TYPE 2 DIABETES MELLITUS WITH HYPERGLYCEMIA, WITH LONG-TERM CURRENT USE OF INSULIN (HCC): ICD-10-CM

## 2023-10-03 DIAGNOSIS — E11.22 TYPE 2 DIABETES MELLITUS WITH STAGE 3A CHRONIC KIDNEY DISEASE, WITH LONG-TERM CURRENT USE OF INSULIN (HCC): ICD-10-CM

## 2023-10-03 DIAGNOSIS — E78.2 MIXED HYPERLIPIDEMIA: ICD-10-CM

## 2023-10-03 DIAGNOSIS — E11.65 TYPE 2 DIABETES MELLITUS WITH HYPERGLYCEMIA, WITH LONG-TERM CURRENT USE OF INSULIN (HCC): ICD-10-CM

## 2023-10-03 DIAGNOSIS — Z79.4 TYPE 2 DIABETES MELLITUS WITH STAGE 3A CHRONIC KIDNEY DISEASE, WITH LONG-TERM CURRENT USE OF INSULIN (HCC): ICD-10-CM

## 2023-10-03 LAB
ALBUMIN SERPL BCP-MCNC: 4 G/DL (ref 3.5–5)
ALP SERPL-CCNC: 84 U/L (ref 34–104)
ALT SERPL W P-5'-P-CCNC: 20 U/L (ref 7–52)
ANION GAP SERPL CALCULATED.3IONS-SCNC: 7 MMOL/L
AST SERPL W P-5'-P-CCNC: 22 U/L (ref 13–39)
BILIRUB SERPL-MCNC: 0.66 MG/DL (ref 0.2–1)
BUN SERPL-MCNC: 20 MG/DL (ref 5–25)
CALCIUM SERPL-MCNC: 10.5 MG/DL (ref 8.4–10.2)
CHLORIDE SERPL-SCNC: 100 MMOL/L (ref 96–108)
CHOLEST SERPL-MCNC: 134 MG/DL
CO2 SERPL-SCNC: 31 MMOL/L (ref 21–32)
CREAT SERPL-MCNC: 0.95 MG/DL (ref 0.6–1.3)
EST. AVERAGE GLUCOSE BLD GHB EST-MCNC: 183 MG/DL
GFR SERPL CREATININE-BSD FRML MDRD: 54 ML/MIN/1.73SQ M
GLUCOSE P FAST SERPL-MCNC: 89 MG/DL (ref 65–99)
HBA1C MFR BLD: 8 %
HDLC SERPL-MCNC: 53 MG/DL
LDLC SERPL CALC-MCNC: 70 MG/DL (ref 0–100)
NONHDLC SERPL-MCNC: 81 MG/DL
POTASSIUM SERPL-SCNC: 4.1 MMOL/L (ref 3.5–5.3)
PROT SERPL-MCNC: 7.5 G/DL (ref 6.4–8.4)
PTH-INTACT SERPL-MCNC: 82.4 PG/ML (ref 12–88)
SODIUM SERPL-SCNC: 138 MMOL/L (ref 135–147)
TRIGL SERPL-MCNC: 56 MG/DL

## 2023-10-03 PROCEDURE — 80061 LIPID PANEL: CPT

## 2023-10-03 PROCEDURE — 83970 ASSAY OF PARATHORMONE: CPT

## 2023-10-03 PROCEDURE — 83036 HEMOGLOBIN GLYCOSYLATED A1C: CPT

## 2023-10-03 PROCEDURE — 80053 COMPREHEN METABOLIC PANEL: CPT

## 2023-10-03 PROCEDURE — 36415 COLL VENOUS BLD VENIPUNCTURE: CPT

## 2023-10-09 ENCOUNTER — OFFICE VISIT (OUTPATIENT)
Dept: FAMILY MEDICINE CLINIC | Facility: CLINIC | Age: 88
End: 2023-10-09
Payer: COMMERCIAL

## 2023-10-09 VITALS
WEIGHT: 149.2 LBS | HEART RATE: 78 BPM | DIASTOLIC BLOOD PRESSURE: 80 MMHG | SYSTOLIC BLOOD PRESSURE: 140 MMHG | OXYGEN SATURATION: 95 % | TEMPERATURE: 97.8 F | RESPIRATION RATE: 18 BRPM | BODY MASS INDEX: 26.43 KG/M2

## 2023-10-09 DIAGNOSIS — E78.49 OTHER HYPERLIPIDEMIA: ICD-10-CM

## 2023-10-09 DIAGNOSIS — Z79.4 TYPE 2 DIABETES MELLITUS WITH STAGE 1 CHRONIC KIDNEY DISEASE, WITH LONG-TERM CURRENT USE OF INSULIN (HCC): ICD-10-CM

## 2023-10-09 DIAGNOSIS — I10 BENIGN ESSENTIAL HYPERTENSION: Primary | ICD-10-CM

## 2023-10-09 DIAGNOSIS — E78.2 MIXED HYPERLIPIDEMIA: ICD-10-CM

## 2023-10-09 DIAGNOSIS — N18.31 TYPE 2 DIABETES MELLITUS WITH STAGE 3A CHRONIC KIDNEY DISEASE, WITH LONG-TERM CURRENT USE OF INSULIN (HCC): ICD-10-CM

## 2023-10-09 DIAGNOSIS — F43.22 ADJUSTMENT DISORDER WITH ANXIOUS MOOD: ICD-10-CM

## 2023-10-09 DIAGNOSIS — N18.31 STAGE 3A CHRONIC KIDNEY DISEASE (HCC): ICD-10-CM

## 2023-10-09 DIAGNOSIS — E11.22 TYPE 2 DIABETES MELLITUS WITH STAGE 3A CHRONIC KIDNEY DISEASE, WITH LONG-TERM CURRENT USE OF INSULIN (HCC): ICD-10-CM

## 2023-10-09 DIAGNOSIS — Z23 ENCOUNTER FOR IMMUNIZATION: ICD-10-CM

## 2023-10-09 DIAGNOSIS — E11.22 TYPE 2 DIABETES MELLITUS WITH STAGE 1 CHRONIC KIDNEY DISEASE, WITH LONG-TERM CURRENT USE OF INSULIN (HCC): ICD-10-CM

## 2023-10-09 DIAGNOSIS — I48.0 PAROXYSMAL ATRIAL FIBRILLATION (HCC): ICD-10-CM

## 2023-10-09 DIAGNOSIS — E83.52 HYPERCALCEMIA: ICD-10-CM

## 2023-10-09 DIAGNOSIS — N18.1 TYPE 2 DIABETES MELLITUS WITH STAGE 1 CHRONIC KIDNEY DISEASE, WITH LONG-TERM CURRENT USE OF INSULIN (HCC): ICD-10-CM

## 2023-10-09 DIAGNOSIS — M85.89 OSTEOPENIA OF MULTIPLE SITES: ICD-10-CM

## 2023-10-09 DIAGNOSIS — Z79.4 TYPE 2 DIABETES MELLITUS WITH STAGE 3A CHRONIC KIDNEY DISEASE, WITH LONG-TERM CURRENT USE OF INSULIN (HCC): ICD-10-CM

## 2023-10-09 PROCEDURE — 90662 IIV NO PRSV INCREASED AG IM: CPT | Performed by: FAMILY MEDICINE

## 2023-10-09 PROCEDURE — 99214 OFFICE O/P EST MOD 30 MIN: CPT | Performed by: FAMILY MEDICINE

## 2023-10-09 PROCEDURE — G0008 ADMIN INFLUENZA VIRUS VAC: HCPCS | Performed by: FAMILY MEDICINE

## 2023-10-09 RX ORDER — SIMVASTATIN 20 MG
20 TABLET ORAL DAILY
Qty: 90 TABLET | Refills: 3 | Status: SHIPPED | OUTPATIENT
Start: 2023-10-09

## 2023-10-09 RX ORDER — INSULIN DETEMIR 100 [IU]/ML
INJECTION, SOLUTION SUBCUTANEOUS
COMMUNITY

## 2023-10-09 NOTE — PROGRESS NOTES
Assessment/Plan:      1. Benign essential hypertension  Assessment & Plan:  Stable  Blood pressure today is 140/80  Home blood pressure readings range between /51-70  Continue current regimen  Follow-up with cardiology      2. Encounter for immunization  -     influenza vaccine, high-dose, PF 0.7 mL (FLUZONE HIGH-DOSE)    3. Other hyperlipidemia  -     simvastatin (ZOCOR) 20 mg tablet; Take 1 tablet (20 mg total) by mouth daily    4. Type 2 diabetes mellitus with stage 3a chronic kidney disease, with long-term current use of insulin (HCC)  Assessment & Plan:    Lab Results   Component Value Date    HGBA1C 8.0 (H) 10/03/2023     There appears to be a discordance between patient's hemoglobin A1c and fasting blood sugars at home. Fasting blood sugars range between 89 and 036  Remains on Trulicity 6.89 mg weekly and Levemir 10 units nightly  Despite elevation of hemoglobin A1c I do not recommend adjusting patient's medications at this time to avoid for hypoglycemia at patient's age    Orders:  -     Hemoglobin A1C; Future; Expected date: 04/06/2024  -     Comprehensive metabolic panel; Future; Expected date: 04/06/2024    5. Paroxysmal atrial fibrillation (HCC)  Assessment & Plan:  Stable  Patient is awake at 78  Rhythm appears regular  Follows with cardiology  Remains on Xarelto 20 mg daily  Continue atenolol 50 mg twice daily and diltiazem 120 mg daily      6. Stage 3a chronic kidney disease [facility])  Assessment & Plan:  Lab Results   Component Value Date    EGFR 54 10/03/2023    EGFR 63 04/04/2023    EGFR 69 12/19/2022    CREATININE 0.95 10/03/2023    CREATININE 0.84 04/04/2023    CREATININE 0.78 12/19/2022   Slightly worsening  Remains CKD stage IIIa      7. Hypercalcemia  Assessment & Plan:  History of parathyroidectomy in 2018  PTH remains stable and within normal limits  Continue to monitor      8. Adjustment disorder with anxious mood  Assessment & Plan:  Mood is good  Denies any complaints      9. Mixed hyperlipidemia  Assessment & Plan:  Stable  Continue simvastatin 20 mg daily      10. 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, with long-term current use of insulin ([address])    11. Osteopenia of multiple sites  Assessment & Plan:  Stable  Calcium remains slightly elevated  Avoid calcium supplementation for now              Subjective:      Patient ID: Abdias Lacy is a 82 y.o. female presents today for routine follow up. Hbpm: /51-70    Had follow up with urogynecology for UTI. Has not had a UTI since 4/2023. Would like me to look a lesion on her toe. Slightly hyperpigmented lesion on right second toenail  Appears as if it is a old hematoma from an injury. Recommend patient follow-up with her dermatologist for further evaluation    FBS:     HPI    The following portions of the patient's history were reviewed and updated as appropriate: allergies, current medications, past family history, past medical history, past social history, past surgical history and problem list.    Review of Systems   Constitutional: Negative for activity change, chills, diaphoresis and fever. HENT: Negative for ear pain, hearing loss, postnasal drip, rhinorrhea, sinus pressure, sinus pain, sneezing and sore throat. Respiratory: Negative for cough, chest tightness, shortness of breath and wheezing. Cardiovascular: Negative for chest pain, palpitations and leg swelling. Gastrointestinal: Negative for abdominal pain, blood in stool, constipation, diarrhea, nausea and vomiting. Genitourinary: Negative for dysuria, frequency, hematuria and urgency. Musculoskeletal: Negative for arthralgias and myalgias. Neurological: Negative for dizziness, syncope, weakness, light-headedness, numbness and headaches.          Objective:      /80 (BP Location: Left arm, Patient Position: Sitting, Cuff Size: Standard)   Pulse 78   Temp 97.8 °F (36.6 °C) (Temporal)   Resp 18   Wt 67.7 kg (149 lb 3.2 oz)   SpO2 95%   BMI 26.43 kg/m²          Physical Exam  Vitals reviewed. Constitutional:       General: She is not in acute distress. Appearance: She is well-developed. She is not diaphoretic. HENT:      Head: Normocephalic and atraumatic. Right Ear: External ear normal.      Left Ear: External ear normal.      Nose: Nose normal. No congestion. Mouth/Throat:      Mouth: Mucous membranes are moist.      Pharynx: Oropharynx is clear. No oropharyngeal exudate. Eyes:      General: No scleral icterus. Right eye: No discharge. Left eye: No discharge. Conjunctiva/sclera: Conjunctivae normal.      Pupils: Pupils are equal, round, and reactive to light. Neck:      Thyroid: No thyromegaly. Vascular: No JVD. Trachea: No tracheal deviation. Cardiovascular:      Rate and Rhythm: Normal rate and regular rhythm. Heart sounds: Normal heart sounds. No murmur heard. No friction rub. No gallop. Pulmonary:      Effort: Pulmonary effort is normal. No respiratory distress. Breath sounds: Normal breath sounds. No wheezing or rales. Chest:      Chest wall: No tenderness. Abdominal:      General: Bowel sounds are normal. There is no distension. Palpations: Abdomen is soft. Tenderness: There is no abdominal tenderness. There is no right CVA tenderness, left CVA tenderness, guarding or rebound. Musculoskeletal:         General: Normal range of motion. Cervical back: Normal range of motion and neck supple. No tenderness. Right lower leg: No edema. Left lower leg: No edema. Lymphadenopathy:      Cervical: No cervical adenopathy. Skin:     General: Skin is warm and dry. Neurological:      Mental Status: She is alert and oriented to person, place, and time. Mental status is at baseline. Psychiatric:         Mood and Affect: Mood normal.         Behavior: Behavior normal.         Thought Content:  Thought content normal. Judgment: Judgment normal.

## 2023-10-09 NOTE — ASSESSMENT & PLAN NOTE
History of parathyroidectomy in 2018  PTH remains stable and within normal limits  Continue to monitor

## 2023-10-09 NOTE — ASSESSMENT & PLAN NOTE
Stable  · Patient is awake at 78  · Rhythm appears regular  · Follows with cardiology  · Remains on Xarelto 20 mg daily  · Continue atenolol 50 mg twice daily and diltiazem 120 mg daily

## 2023-10-09 NOTE — ASSESSMENT & PLAN NOTE
Lab Results   Component Value Date    HGBA1C 8.0 (H) 10/03/2023     · There appears to be a discordance between patient's hemoglobin A1c and fasting blood sugars at home.   · Fasting blood sugars range between 89 and 130  · Remains on Trulicity 4.09 mg weekly and Levemir 10 units nightly  · Despite elevation of hemoglobin A1c I do not recommend adjusting patient's medications at this time to avoid for hypoglycemia at patient's age

## 2023-10-09 NOTE — ASSESSMENT & PLAN NOTE
Lab Results   Component Value Date    EGFR 54 10/03/2023    EGFR 63 04/04/2023    EGFR 69 12/19/2022    CREATININE 0.95 10/03/2023    CREATININE 0.84 04/04/2023    CREATININE 0.78 12/19/2022   Slightly worsening  Remains CKD stage IIIa

## 2023-10-09 NOTE — ASSESSMENT & PLAN NOTE
Stable  · Blood pressure today is 140/80  · Home blood pressure readings range between /51-70  · Continue current regimen  · Follow-up with cardiology

## 2023-10-30 ENCOUNTER — VBI (OUTPATIENT)
Dept: ADMINISTRATIVE | Facility: OTHER | Age: 88
End: 2023-10-30

## 2023-10-30 NOTE — TELEPHONE ENCOUNTER
Upon review of the In Basket request and the patient's chart, initial outreach has been made via telephone call to facility. Please see Contacts section for details.      Thank you  Maria Eugenia Zhang MA

## 2024-04-04 ENCOUNTER — RA CDI HCC (OUTPATIENT)
Dept: OTHER | Facility: HOSPITAL | Age: 89
End: 2024-04-04

## 2024-04-06 DIAGNOSIS — Z79.4 TYPE 2 DIABETES MELLITUS WITH STAGE 3A CHRONIC KIDNEY DISEASE, WITH LONG-TERM CURRENT USE OF INSULIN (HCC): ICD-10-CM

## 2024-04-06 DIAGNOSIS — N18.31 TYPE 2 DIABETES MELLITUS WITH STAGE 3A CHRONIC KIDNEY DISEASE, WITH LONG-TERM CURRENT USE OF INSULIN (HCC): ICD-10-CM

## 2024-04-06 DIAGNOSIS — E11.65 TYPE 2 DIABETES MELLITUS WITH HYPERGLYCEMIA, WITH LONG-TERM CURRENT USE OF INSULIN (HCC): ICD-10-CM

## 2024-04-06 DIAGNOSIS — Z79.4 TYPE 2 DIABETES MELLITUS WITH HYPERGLYCEMIA, WITH LONG-TERM CURRENT USE OF INSULIN (HCC): ICD-10-CM

## 2024-04-06 DIAGNOSIS — E11.22 TYPE 2 DIABETES MELLITUS WITH STAGE 3A CHRONIC KIDNEY DISEASE, WITH LONG-TERM CURRENT USE OF INSULIN (HCC): ICD-10-CM

## 2024-04-07 RX ORDER — BLOOD SUGAR DIAGNOSTIC
STRIP MISCELLANEOUS
Qty: 100 STRIP | Refills: 1 | Status: SHIPPED | OUTPATIENT
Start: 2024-04-07

## 2024-04-09 LAB
ALBUMIN SERPL-MCNC: 4.1 G/DL (ref 3.5–5.7)
ALP SERPL-CCNC: 90 U/L (ref 35–120)
ALT SERPL-CCNC: 19 U/L
ANION GAP SERPL CALCULATED.3IONS-SCNC: 7 MMOL/L (ref 3–11)
AST SERPL-CCNC: 20 U/L
BILIRUB SERPL-MCNC: 0.5 MG/DL (ref 0.2–1)
BUN SERPL-MCNC: 19 MG/DL (ref 7–25)
CALCIUM SERPL-MCNC: 10.8 MG/DL (ref 8.5–10.1)
CHLORIDE SERPL-SCNC: 100 MMOL/L (ref 100–109)
CO2 SERPL-SCNC: 33 MMOL/L (ref 21–31)
CREAT SERPL-MCNC: 0.82 MG/DL (ref 0.4–1.1)
CYTOLOGY CMNT CVX/VAG CYTO-IMP: ABNORMAL
EST. AVERAGE GLUCOSE BLD GHB EST-MCNC: 177 MG/DL
GFR/BSA.PRED SERPLBLD CYS-BASED-ARV: 69 ML/MIN/{1.73_M2}
GLUCOSE SERPL-MCNC: 128 MG/DL (ref 65–99)
HBA1C MFR BLD: 7.8 %
POTASSIUM SERPL-SCNC: 4 MMOL/L (ref 3.5–5.2)
PROT SERPL-MCNC: 7.3 G/DL (ref 6.3–8.3)
SODIUM SERPL-SCNC: 140 MMOL/L (ref 135–145)

## 2024-04-15 ENCOUNTER — OFFICE VISIT (OUTPATIENT)
Dept: FAMILY MEDICINE CLINIC | Facility: CLINIC | Age: 89
End: 2024-04-15
Payer: COMMERCIAL

## 2024-04-15 VITALS
HEIGHT: 63 IN | TEMPERATURE: 97.3 F | OXYGEN SATURATION: 96 % | BODY MASS INDEX: 25.37 KG/M2 | HEART RATE: 74 BPM | WEIGHT: 143.2 LBS | SYSTOLIC BLOOD PRESSURE: 118 MMHG | DIASTOLIC BLOOD PRESSURE: 60 MMHG

## 2024-04-15 DIAGNOSIS — M85.89 OSTEOPENIA OF MULTIPLE SITES: ICD-10-CM

## 2024-04-15 DIAGNOSIS — N18.1 TYPE 2 DIABETES MELLITUS WITH STAGE 1 CHRONIC KIDNEY DISEASE, WITH LONG-TERM CURRENT USE OF INSULIN (HCC): ICD-10-CM

## 2024-04-15 DIAGNOSIS — E11.22 TYPE 2 DIABETES MELLITUS WITH STAGE 3A CHRONIC KIDNEY DISEASE, WITH LONG-TERM CURRENT USE OF INSULIN (HCC): ICD-10-CM

## 2024-04-15 DIAGNOSIS — N18.31 TYPE 2 DIABETES MELLITUS WITH STAGE 3A CHRONIC KIDNEY DISEASE, WITH LONG-TERM CURRENT USE OF INSULIN (HCC): ICD-10-CM

## 2024-04-15 DIAGNOSIS — F41.9 ANXIETY: ICD-10-CM

## 2024-04-15 DIAGNOSIS — I48.0 PAROXYSMAL ATRIAL FIBRILLATION (HCC): ICD-10-CM

## 2024-04-15 DIAGNOSIS — Z00.00 MEDICARE ANNUAL WELLNESS VISIT, SUBSEQUENT: Primary | ICD-10-CM

## 2024-04-15 DIAGNOSIS — E11.9 TYPE 2 DIABETES MELLITUS WITHOUT COMPLICATION, UNSPECIFIED WHETHER LONG TERM INSULIN USE (HCC): ICD-10-CM

## 2024-04-15 DIAGNOSIS — N18.31 STAGE 3A CHRONIC KIDNEY DISEASE (HCC): ICD-10-CM

## 2024-04-15 DIAGNOSIS — Z79.4 TYPE 2 DIABETES MELLITUS WITH STAGE 3A CHRONIC KIDNEY DISEASE, WITH LONG-TERM CURRENT USE OF INSULIN (HCC): ICD-10-CM

## 2024-04-15 DIAGNOSIS — Z79.4 TYPE 2 DIABETES MELLITUS WITH STAGE 1 CHRONIC KIDNEY DISEASE, WITH LONG-TERM CURRENT USE OF INSULIN (HCC): ICD-10-CM

## 2024-04-15 DIAGNOSIS — E11.22 TYPE 2 DIABETES MELLITUS WITH STAGE 1 CHRONIC KIDNEY DISEASE, WITH LONG-TERM CURRENT USE OF INSULIN (HCC): ICD-10-CM

## 2024-04-15 DIAGNOSIS — I10 BENIGN ESSENTIAL HYPERTENSION: ICD-10-CM

## 2024-04-15 DIAGNOSIS — E83.52 HYPERCALCEMIA: ICD-10-CM

## 2024-04-15 DIAGNOSIS — I72.8 SPLENIC ARTERY ANEURYSM (HCC): ICD-10-CM

## 2024-04-15 DIAGNOSIS — E78.2 MIXED HYPERLIPIDEMIA: ICD-10-CM

## 2024-04-15 DIAGNOSIS — R80.9 MICROPROTEINURIA: ICD-10-CM

## 2024-04-15 DIAGNOSIS — I48.91 ATRIAL FIBRILLATION, UNSPECIFIED TYPE (HCC): ICD-10-CM

## 2024-04-15 PROCEDURE — G0439 PPPS, SUBSEQ VISIT: HCPCS | Performed by: FAMILY MEDICINE

## 2024-04-15 PROCEDURE — 99214 OFFICE O/P EST MOD 30 MIN: CPT | Performed by: FAMILY MEDICINE

## 2024-04-15 RX ORDER — ATENOLOL 50 MG/1
50 TABLET ORAL 2 TIMES DAILY
Qty: 180 TABLET | Refills: 3 | Status: SHIPPED | OUTPATIENT
Start: 2024-04-15

## 2024-04-15 RX ORDER — LISINOPRIL 20 MG/1
20 TABLET ORAL DAILY
Qty: 90 TABLET | Refills: 3 | Status: SHIPPED | OUTPATIENT
Start: 2024-04-15

## 2024-04-15 RX ORDER — PEN NEEDLE, DIABETIC 32GX 5/32"
NEEDLE, DISPOSABLE MISCELLANEOUS DAILY
Qty: 100 EACH | Refills: 3 | Status: SHIPPED | OUTPATIENT
Start: 2024-04-15

## 2024-04-15 RX ORDER — HYDROCHLOROTHIAZIDE 12.5 MG/1
12.5 TABLET ORAL DAILY
Qty: 90 TABLET | Refills: 3 | Status: SHIPPED | OUTPATIENT
Start: 2024-04-15

## 2024-04-15 RX ORDER — DILTIAZEM HYDROCHLORIDE 120 MG/1
120 CAPSULE, COATED, EXTENDED RELEASE ORAL DAILY
Qty: 90 CAPSULE | Refills: 3 | Status: SHIPPED | OUTPATIENT
Start: 2024-04-15

## 2024-04-15 NOTE — PATIENT INSTRUCTIONS
Call dr morelos to see if you should continue the estradiol cream    Medicare Preventive Visit Patient Instructions  Thank you for completing your Welcome to Medicare Visit or Medicare Annual Wellness Visit today. Your next wellness visit will be due in one year (4/16/2025).  The screening/preventive services that you may require over the next 5-10 years are detailed below. Some tests may not apply to you based off risk factors and/or age. Screening tests ordered at today's visit but not completed yet may show as past due. Also, please note that scanned in results may not display below.  Preventive Screenings:  Service Recommendations Previous Testing/Comments   Colorectal Cancer Screening  * Colonoscopy    * Fecal Occult Blood Test (FOBT)/Fecal Immunochemical Test (FIT)  * Fecal DNA/Cologuard Test  * Flexible Sigmoidoscopy Age: 45-75 years old   Colonoscopy: every 10 years (may be performed more frequently if at higher risk)  OR  FOBT/FIT: every 1 year  OR  Cologuard: every 3 years  OR  Sigmoidoscopy: every 5 years  Screening may be recommended earlier than age 45 if at higher risk for colorectal cancer. Also, an individualized decision between you and your healthcare provider will decide whether screening between the ages of 76-85 would be appropriate. Colonoscopy: 02/16/2017  FOBT/FIT: Not on file  Cologuard: Not on file  Sigmoidoscopy: Not on file    Screening Not Indicated     Breast Cancer Screening Age: 40+ years old  Frequency: every 1-2 years  Not required if history of left and right mastectomy Mammogram: 04/20/2022    Screening Current   Cervical Cancer Screening Between the ages of 21-29, pap smear recommended once every 3 years.   Between the ages of 30-65, can perform pap smear with HPV co-testing every 5 years.   Recommendations may differ for women with a history of total hysterectomy, cervical cancer, or abnormal pap smears in past. Pap Smear: Not on file    Screening Not Indicated   Hepatitis C  Screening Once for adults born between 1945 and 1965  More frequently in patients at high risk for Hepatitis C Hep C Antibody: Not on file        Diabetes Screening 1-2 times per year if you're at risk for diabetes or have pre-diabetes Fasting glucose: 89 mg/dL (10/3/2023)  A1C: 7.8 % (4/9/2024)  Screening Not Indicated  History Diabetes   Cholesterol Screening Once every 5 years if you don't have a lipid disorder. May order more often based on risk factors. Lipid panel: 10/03/2023    Screening Not Indicated  History Lipid Disorder     Other Preventive Screenings Covered by Medicare:  Abdominal Aortic Aneurysm (AAA) Screening: covered once if your at risk. You're considered to be at risk if you have a family history of AAA.  Lung Cancer Screening: covers low dose CT scan once per year if you meet all of the following conditions: (1) Age 55-77; (2) No signs or symptoms of lung cancer; (3) Current smoker or have quit smoking within the last 15 years; (4) You have a tobacco smoking history of at least 20 pack years (packs per day multiplied by number of years you smoked); (5) You get a written order from a healthcare provider.  Glaucoma Screening: covered annually if you're considered high risk: (1) You have diabetes OR (2) Family history of glaucoma OR (3)  aged 50 and older OR (4)  American aged 65 and older  Osteoporosis Screening: covered every 2 years if you meet one of the following conditions: (1) You're estrogen deficient and at risk for osteoporosis based off medical history and other findings; (2) Have a vertebral abnormality; (3) On glucocorticoid therapy for more than 3 months; (4) Have primary hyperparathyroidism; (5) On osteoporosis medications and need to assess response to drug therapy.   Last bone density test (DXA Scan): 03/27/2023.  HIV Screening: covered annually if you're between the age of 15-65. Also covered annually if you are younger than 15 and older than 65 with risk  factors for HIV infection. For pregnant patients, it is covered up to 3 times per pregnancy.    Immunizations:  Immunization Recommendations   Influenza Vaccine Annual influenza vaccination during flu season is recommended for all persons aged >= 6 months who do not have contraindications   Pneumococcal Vaccine   * Pneumococcal conjugate vaccine = PCV13 (Prevnar 13), PCV15 (Vaxneuvance), PCV20 (Prevnar 20)  * Pneumococcal polysaccharide vaccine = PPSV23 (Pneumovax) Adults 19-65 yo with certain risk factors or if 65+ yo  If never received any pneumonia vaccine: recommend Prevnar 20 (PCV20)  Give PCV20 if previously received 1 dose of PCV13 or PPSV23   Hepatitis B Vaccine 3 dose series if at intermediate or high risk (ex: diabetes, end stage renal disease, liver disease)   Respiratory syncytial virus (RSV) Vaccine - COVERED BY MEDICARE PART D  * RSVPreF3 (Arexvy) CDC recommends that adults 60 years of age and older may receive a single dose of RSV vaccine using shared clinical decision-making (SCDM)   Tetanus (Td) Vaccine - COST NOT COVERED BY MEDICARE PART B Following completion of primary series, a booster dose should be given every 10 years to maintain immunity against tetanus. Td may also be given as tetanus wound prophylaxis.   Tdap Vaccine - COST NOT COVERED BY MEDICARE PART B Recommended at least once for all adults. For pregnant patients, recommended with each pregnancy.   Shingles Vaccine (Shingrix) - COST NOT COVERED BY MEDICARE PART B  2 shot series recommended in those 19 years and older who have or will have weakened immune systems or those 50 years and older     Health Maintenance Due:      Topic Date Due    DXA SCAN  03/27/2025     Immunizations Due:      Topic Date Due    COVID-19 Vaccine (7 - 2023-24 season) 09/01/2023     Advance Directives   What are advance directives?  Advance directives are legal documents that state your wishes and plans for medical care. These plans are made ahead of time in  case you lose your ability to make decisions for yourself. Advance directives can apply to any medical decision, such as the treatments you want, and if you want to donate organs.   What are the types of advance directives?  There are many types of advance directives, and each state has rules about how to use them. You may choose a combination of any of the following:  Living will:  This is a written record of the treatment you want. You can also choose which treatments you do not want, which to limit, and which to stop at a certain time. This includes surgery, medicine, IV fluid, and tube feedings.   Durable power of  for healthcare (DPAHC):  This is a written record that states who you want to make healthcare choices for you when you are unable to make them for yourself. This person, called a proxy, is usually a family member or a friend. You may choose more than 1 proxy.  Do not resuscitate (DNR) order:  A DNR order is used in case your heart stops beating or you stop breathing. It is a request not to have certain forms of treatment, such as CPR. A DNR order may be included in other types of advance directives.  Medical directive:  This covers the care that you want if you are in a coma, near death, or unable to make decisions for yourself. You can list the treatments you want for each condition. Treatment may include pain medicine, surgery, blood transfusions, dialysis, IV or tube feedings, and a ventilator (breathing machine).  Values history:  This document has questions about your views, beliefs, and how you feel and think about life. This information can help others choose the care that you would choose.  Why are advance directives important?  An advance directive helps you control your care. Although spoken wishes may be used, it is better to have your wishes written down. Spoken wishes can be misunderstood, or not followed. Treatments may be given even if you do not want them. An advance directive  may make it easier for your family to make difficult choices about your care.   Urinary Incontinence   Urinary incontinence (UI)  is when you lose control of your bladder. UI develops because your bladder cannot store or empty urine properly. The 3 most common types of UI are stress incontinence, urge incontinence, or both.  Medicines:   May be given to help strengthen your bladder control. Report any side effects of medication to your healthcare provider.  Do pelvic muscle exercises often:  Your pelvic muscles help you stop urinating. Squeeze these muscles tight for 5 seconds, then relax for 5 seconds. Gradually work up to squeezing for 10 seconds. Do 3 sets of 15 repetitions a day, or as directed. This will help strengthen your pelvic muscles and improve bladder control.  Train your bladder:  Go to the bathroom at set times, such as every 2 hours, even if you do not feel the urge to go. You can also try to hold your urine when you feel the urge to go. For example, hold your urine for 5 minutes when you feel the urge to go. As that becomes easier, hold your urine for 10 minutes.   Self-care:   Keep a UI record.  Write down how often you leak urine and how much you leak. Make a note of what you were doing when you leaked urine.  Drink liquids as directed. You may need to limit the amount of liquid you drink to help control your urine leakage. Do not drink any liquid right before you go to bed. Limit or do not have drinks that contain caffeine or alcohol.   Prevent constipation.  Eat a variety of high-fiber foods. Good examples are high-fiber cereals, beans, vegetables, and whole-grain breads. Walking is the best way to trigger your intestines to have a bowel movement.  Exercise regularly and maintain a healthy weight.  Weight loss and exercise will decrease pressure on your bladder and help you control your leakage.   Use a catheter as directed  to help empty your bladder. A catheter is a tiny, plastic tube that is  put into your bladder to drain your urine.   Go to behavior therapy as directed.  Behavior therapy may be used to help you learn to control your urge to urinate.    Weight Management   Why it is important to manage your weight:  Being overweight increases your risk of health conditions such as heart disease, high blood pressure, type 2 diabetes, and certain types of cancer. It can also increase your risk for osteoarthritis, sleep apnea, and other respiratory problems. Aim for a slow, steady weight loss. Even a small amount of weight loss can lower your risk of health problems.  How to lose weight safely:  A safe and healthy way to lose weight is to eat fewer calories and get regular exercise. You can lose up about 1 pound a week by decreasing the number of calories you eat by 500 calories each day.   Healthy meal plan for weight management:  A healthy meal plan includes a variety of foods, contains fewer calories, and helps you stay healthy. A healthy meal plan includes the following:  Eat whole-grain foods more often.  A healthy meal plan should contain fiber. Fiber is the part of grains, fruits, and vegetables that is not broken down by your body. Whole-grain foods are healthy and provide extra fiber in your diet. Some examples of whole-grain foods are whole-wheat breads and pastas, oatmeal, brown rice, and bulgur.  Eat a variety of vegetables every day.  Include dark, leafy greens such as spinach, kale, lisha greens, and mustard greens. Eat yellow and orange vegetables such as carrots, sweet potatoes, and winter squash.   Eat a variety of fruits every day.  Choose fresh or canned fruit (canned in its own juice or light syrup) instead of juice. Fruit juice has very little or no fiber.  Eat low-fat dairy foods.  Drink fat-free (skim) milk or 1% milk. Eat fat-free yogurt and low-fat cottage cheese. Try low-fat cheeses such as mozzarella and other reduced-fat cheeses.  Choose meat and other protein foods that are  low in fat.  Choose beans or other legumes such as split peas or lentils. Choose fish, skinless poultry (chicken or turkey), or lean cuts of red meat (beef or pork). Before you cook meat or poultry, cut off any visible fat.   Use less fat and oil.  Try baking foods instead of frying them. Add less fat, such as margarine, sour cream, regular salad dressing and mayonnaise to foods. Eat fewer high-fat foods. Some examples of high-fat foods include french fries, doughnuts, ice cream, and cakes.  Eat fewer sweets.  Limit foods and drinks that are high in sugar. This includes candy, cookies, regular soda, and sweetened drinks.  Exercise:  Exercise at least 30 minutes per day on most days of the week. Some examples of exercise include walking, biking, dancing, and swimming. You can also fit in more physical activity by taking the stairs instead of the elevator or parking farther away from stores. Ask your healthcare provider about the best exercise plan for you.      © Copyright [employer] 2018 Information is for End User's use only and may not be sold, redistributed or otherwise used for commercial purposes. All illustrations and images included in CareNotes® are the copyrighted property of [employer] or [employer]

## 2024-04-15 NOTE — PROGRESS NOTES
Assessment and Plan:     Problem List Items Addressed This Visit        Cardiovascular and Mediastinum    Benign essential hypertension     Well-controlled  Blood pressure today is 118/60  Denies episodes of hypotension  Continue atenolol 50 mg twice daily, Cardizem 120 mg daily, hydrochlorothiazide 12.5 mg, and lisinopril 20 mg  Under the care of cardiology  No change to current regimen         Relevant Medications    diltiazem (CARDIZEM CD) 120 mg 24 hr capsule    lisinopril (ZESTRIL) 20 mg tablet    hydroCHLOROthiazide 12.5 mg tablet    atenolol (TENORMIN) 50 mg tablet    Other Relevant Orders    Comprehensive metabolic panel    Paroxysmal atrial fibrillation (HCC)     Stable  Heart rate 74 today, rhythm regular  Anticoagulated on Xarelto 20 mg daily  Rate controlled with Cardizem 120 mg daily and atenolol 50 mg twice daily  Follows with cardiology         Relevant Medications    diltiazem (CARDIZEM CD) 120 mg 24 hr capsule    atenolol (TENORMIN) 50 mg tablet    Splenic artery aneurysm (HCC)     Continues surveillance with vascular surgery            Endocrine    Type 2 diabetes mellitus with stage 3a chronic kidney disease, with long-term current use of insulin ([facility])       Lab Results   Component Value Date    HGBA1C 7.8 (H) 04/09/2024     Improving  A1c is at goal <8 based on age  Fbs ranging  average is 110  There does appear to be a discrepancy in A1c compared to fasting blood sugars  Continue trulicity 0.75 and levemir 10u  No episodes of hypoglycemia         Relevant Medications    hydroCHLOROthiazide 12.5 mg tablet       Musculoskeletal and Integument    Osteopenia of multiple sites     Calcium remains elevated, PTH is normal  Continue holding off on calcium supplementation         Relevant Orders    PTH, intact       Genitourinary    Stage 3a chronic kidney disease ([facility])     Lab Results   Component Value Date    EGFR 69 04/09/2024    EGFR 54 10/03/2023    EGFR 63 04/04/2023    CREATININE 0.82  04/09/2024    CREATININE 0.95 10/03/2023    CREATININE 0.84 04/04/2023   Waxes and wanes but appears stable  Continue current regimen of lisinopril 20 mg         Relevant Medications    hydroCHLOROthiazide 12.5 mg tablet       Behavioral Health    Anxiety     Patient has occasional episodes of anxiety but is able to control this on her home            Other    Hypercalcemia    Relevant Orders    PTH, intact    Microproteinuria    Mixed hyperlipidemia   Other Visit Diagnoses     Medicare annual wellness visit, subsequent    -  Primary    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, with long-term current use of insulin (HCC)        Relevant Medications    hydroCHLOROthiazide 12.5 mg tablet    Other Relevant Orders    Albumin / creatinine urine ratio    Hemoglobin A1C    Type 2 diabetes mellitus without complication, unspecified whether long term insulin use (HCC)        Relevant Medications    BD Pen Needle Glory U/F 32G X 4 MM MISC    Atrial fibrillation, unspecified type (HCC)        Relevant Medications    diltiazem (CARDIZEM CD) 120 mg 24 hr capsule    rivaroxaban (Xarelto) 20 mg tablet    atenolol (TENORMIN) 50 mg tablet          Depression Screening and Follow-up Plan: Patient was screened for depression during today's encounter. They screened negative with a PHQ-2 score of 0.    Urinary Incontinence Plan of Care: counseling topics discussed: practice Kegel (pelvic floor strengthening) exercises, use restroom every 2 hours, limit alcohol, caffeine, spicy foods, and acidic foods and limiting fluid intake 3-4 hours before bed.       Preventive health issues were discussed with patient, and age appropriate screening tests were ordered as noted in patient's After Visit Summary.  Personalized health advice and appropriate referrals for health education or preventive services given if needed, as noted in patient's After Visit Summary.     History of Present Illness:     Patient presents for a Medicare  Wellness Visit    [facility]   Patient Care Team:  Federico Estrada DO as PCP - General (Family Medicine)  Shyam Whitney MD as PCP - PCP-[facility]  Rohan Kim MD (Cardiology)     Review of Systems:     Review of Systems   Constitutional:  Negative for activity change, chills, diaphoresis and fever.   HENT:  Negative for ear pain, hearing loss, postnasal drip, rhinorrhea, sinus pressure, sinus pain, sneezing and sore throat.    Respiratory:  Negative for cough, chest tightness, shortness of breath and wheezing.    Cardiovascular:  Negative for chest pain, palpitations and leg swelling.   Gastrointestinal:  Negative for abdominal pain, blood in stool, constipation, diarrhea, nausea and vomiting.   Genitourinary:  Negative for dysuria, frequency, hematuria and urgency.   Musculoskeletal:  Negative for arthralgias and myalgias.   Neurological:  Negative for dizziness, syncope, weakness, light-headedness, numbness and headaches.        Problem List:     Patient Active Problem List   Diagnosis   • Benign essential hypertension   • Prolapse of anterior vaginal wall   • Fibrocystic breast disease   • Hypercalcemia   • Incontinence   • Kyphosis   • Microproteinuria   • Ventricular premature depolarization   • Tinnitus   • Paroxysmal atrial fibrillation (HCC)   • Mixed hyperlipidemia   • Type 2 diabetes mellitus with stage 3a chronic kidney disease, with long-term current use of insulin (HCC)   • Splenic artery aneurysm (HCC)   • Thyroid nodule   • Vitamin D deficiency   • BMI 28.0-28.9,adult   • Osteopenia of multiple sites   • Stage 3a chronic kidney disease (HCC)   • Dysequilibrium   • Anxiety   • Hypoalbuminemia   • Adjustment disorder with anxious mood   • Pointblank-Walker grade 2 cystocele   • Type 2 diabetes mellitus with hyperglycemia (HCC)   • GERD without esophagitis   • Urinary urgency   • Other constipation   • Dysuria      Past Medical and Surgical History:     Past Medical  History:   Diagnosis Date   • Anemia     IRON DEF ANEMIA DUE TO MENORRHAGIA   • Diabetes mellitus (HCC)    • Hypercalcemia 10/06/2010    MILD  DECREASES HCTZ   • Hypertension    • Melanoma (HCC) 11/2011    IN SITU  MID BACK   • Palpitations 05/28/2018    SINUS TACHYCARDIA  TROPONIN SPLL   • Skin cancer 2017    NOSE     Past Surgical History:   Procedure Laterality Date   • GALLBLADDER SURGERY  1991   • HYSTERECTOMY  1995    UTERINE PROLAPSE VAGINAL POLYPS   • HYSTERECTOMY      IRON REPLACEMENT   • OTHER SURGICAL HISTORY  11/2011    EXCISION MELANOMA IN SITU MID BACK   • PARATHYROIDECTOMY  05/21/2018      Family History:     Family History   Problem Relation Age of Onset   • Other Mother         TESTED(-)   • Colon cancer Father 60   • Prostate cancer Father 70   • Factor V Leiden deficiency Daughter    • Factor V Leiden deficiency Daughter    • Breast cancer Sister         in her 60's   • No Known Problems Maternal Grandmother    • No Known Problems Maternal Grandfather    • No Known Problems Paternal Grandmother    • No Known Problems Paternal Grandfather    • No Known Problems Sister    • No Known Problems Sister    • No Known Problems Maternal Aunt    • No Known Problems Maternal Aunt    • No Known Problems Maternal Aunt    • No Known Problems Maternal Aunt    • No Known Problems Maternal Aunt    • No Known Problems Maternal Aunt    • No Known Problems Maternal Aunt    • No Known Problems Paternal Aunt    • No Known Problems Paternal Aunt    • No Known Problems Paternal Aunt       Social History:     Social History     Socioeconomic History   • Marital status: /Civil Union     Spouse name: None   • Number of children: None   • Years of education: None   • Highest education level: None   Occupational History   • None   Tobacco Use   • Smoking status: Never   • Smokeless tobacco: Never   Vaping Use   • Vaping status: Never Used   Substance and Sexual Activity   • Alcohol use: No   • Drug use: No   • Sexual  activity: Never   Other Topics Concern   • None   Social History Narrative   • None     Social Determinants of Health     Financial Resource Strain: Low Risk  (1/11/2023)    Overall Financial Resource Strain (CARDIA)    • Difficulty of Paying Living Expenses: Not hard at all   Food Insecurity: No Food Insecurity (4/15/2024)    Hunger Vital Sign    • Worried About Running Out of Food in the Last Year: Never true    • Ran Out of Food in the Last Year: Never true   Transportation Needs: No Transportation Needs (4/15/2024)    PRAPARE - Transportation    • Lack of Transportation (Medical): No    • Lack of Transportation (Non-Medical): No   Physical Activity: Not on file   Stress: Not on file   Social Connections: Not on file   Intimate Partner Violence: Not on file   Housing Stability: Low Risk  (4/15/2024)    Housing Stability Vital Sign    • Unable to Pay for Housing in the Last Year: No    • Number of Places Lived in the Last Year: 1    • Unstable Housing in the Last Year: No      Medications and Allergies:     Current Outpatient Medications   Medication Sig Dispense Refill   • atenolol (TENORMIN) 50 mg tablet Take 1 tablet (50 mg total) by mouth 2 (two) times a day 180 tablet 3   • BD Pen Needle Glory U/F 32G X 4 MM MISC Inject as directed daily Test as directed 100 each 3   • diltiazem (CARDIZEM CD) 120 mg 24 hr capsule Take 1 capsule (120 mg total) by mouth daily 90 capsule 3   • hydroCHLOROthiazide 12.5 mg tablet Take 1 tablet (12.5 mg total) by mouth daily 90 tablet 3   • lisinopril (ZESTRIL) 20 mg tablet Take 1 tablet (20 mg total) by mouth daily 90 tablet 3   • rivaroxaban (Xarelto) 20 mg tablet Take 1 tablet (20 mg total) by mouth daily with breakfast 90 tablet 3   • Blood Glucose Monitoring Suppl (OneTouch Verio Reflect) w/Device KIT Check blood sugars once daily. Please substitute with appropriate alternative as covered by patient's insurance. Dx: E11.65 1 kit 0   • dulaglutide (Trulicity) 0.75 MG/0.5ML  injection Inject 0.5 mL (0.75 mg total) under the skin every 7 days 6 mL 5   • estradiol (ESTRACE) 0.1 mg/g vaginal cream Insert 2 g into the vagina daily     • Fiber Adult Gummies 2 g CHEW Chew 2 each in the morning 120 tablet 1   • glucose blood (OneTouch Verio) test strip CHECK BLOOD SUGARS ONCE DAILY. DX: E11.65 100 strip 1   • insulin detemir (Levemir FlexTouch) 100 Units/mL injection pen Inject 10 Units under the skin daily at bedtime 15 mL 3   • Lancets (accu-chek multiclix) lancets Use daily Use as instructed 100 each 3   • MICROLET LANCETS MISC by Does not apply route daily 50 each 3   • OneTouch Delica Lancets 33G MISC Check blood sugars once daily. Please substitute with appropriate alternative as covered by patient's insurance. Dx: E11.65 100 each 3   • simvastatin (ZOCOR) 20 mg tablet Take 1 tablet (20 mg total) by mouth daily 90 tablet 3   • Vitamin D, Cholecalciferol, 50 MCG (2000 UT) CAPS Take 1 capsule by mouth daily       No current facility-administered medications for this visit.     Allergies   Allergen Reactions   • No Active Allergies       Immunizations:     Immunization History   Administered Date(s) Administered   • COVID-19 MODERNA VACC 0.5 ML IM 01/14/2021, 02/11/2021, 10/27/2021, 04/05/2022, 05/24/2022, 10/10/2022   • H1N1, All Formulations 12/22/2009   • INFLUENZA 10/12/2007, 10/16/2008, 10/15/2009, 09/23/2015, 11/16/2016, 11/16/2016, 10/04/2017   • Influenza Split 10/06/2010, 10/11/2011, 10/27/2012, 10/08/2013   • Influenza Split High Dose Preservative Free IM 09/25/2014, 10/04/2017   • Influenza, high dose seasonal 0.7 mL 10/15/2018, 10/22/2019, 09/14/2020, 10/18/2021, 10/03/2022, 10/09/2023   • Influenza, seasonal, injectable 10/16/2008, 10/15/2009, 09/23/2015   • Pneumococcal Conjugate 13-Valent 03/30/2015   • Pneumococcal Polysaccharide PPV23 12/05/1997, 10/06/2010   • Td (adult), adsorbed 04/12/1984, 07/18/2011   • Tdap 03/10/2022   • Zoster 04/08/2009, 04/08/2009   • Zoster  Vaccine Recombinant 06/25/2019, 09/30/2019      Health Maintenance:         Topic Date Due   • DXA SCAN  03/27/2025         Topic Date Due   • COVID-19 Vaccine (7 - 2023-24 season) 09/01/2023      Medicare Screening Tests and Risk Assessments:     Jenny is here for her Subsequent Wellness visit. Last Medicare Wellness visit information reviewed, patient interviewed and updates made to the record today.      Health Risk Assessment:   Patient rates overall health as good. Patient feels that their physical health rating is same. Patient is satisfied with their life. Eyesight was rated as same. Hearing was rated as same. Patient feels that their emotional and mental health rating is same. Patients states they are sometimes angry. Patient states they are sometimes unusually tired/fatigued. Pain experienced in the last 7 days has been none. Patient states that she has experienced no weight loss or gain in last 6 months.     Depression Screening:   PHQ-2 Score: 0      Fall Risk Screening:   In the past year, patient has experienced: no history of falling in past year      Urinary Incontinence Screening:   Patient has leaked urine accidently in the last six months.     Home Safety:  Patient does not have trouble with stairs inside or outside of their home. Patient has working smoke alarms and has working carbon monoxide detector. Home safety hazards include: none.     Nutrition:   Current diet is Regular.     Medications:   Patient is currently taking over-the-counter supplements. OTC medications include: see medication list. Patient is able to manage medications.     Activities of Daily Living (ADLs)/Instrumental Activities of Daily Living (IADLs):   Walk and transfer into and out of bed and chair?: Yes  Dress and groom yourself?: Yes    Bathe or shower yourself?: Yes    Feed yourself? Yes  Do your laundry/housekeeping?: Yes  Manage your money, pay your bills and track your expenses?: Yes  Make your own meals?: Yes    Do  your own shopping?: Yes    Previous Hospitalizations:   Any hospitalizations or ED visits within the last 12 months?: No      Advance Care Planning:   Living will: Yes    Durable POA for healthcare: Yes    Advanced directive: Yes    Advanced directive counseling given: Yes    End of Life Decisions reviewed with patient: Yes    Provider agrees with end of life decisions: Yes      Cognitive Screening:   Provider or family/friend/caregiver concerned regarding cognition?: No    PREVENTIVE SCREENINGS      Cardiovascular Screening:    General: Screening Not Indicated and History Lipid Disorder      Diabetes Screening:     General: Screening Not Indicated and History Diabetes      Colorectal Cancer Screening:     General: Screening Not Indicated      Breast Cancer Screening:     General: Screening Current      Cervical Cancer Screening:    General: Screening Not Indicated      Osteoporosis Screening:    General: Screening Current      Abdominal Aortic Aneurysm (AAA) Screening:        General: Screening Not Indicated      Lung Cancer Screening:     General: Screening Not Indicated      Hepatitis C Screening:    General: Patient Declines    Screening, Brief Intervention, and Referral to Treatment (SBIRT)    Screening      Single Item Drug Screening:  How often have you used an illegal drug (including marijuana) or a prescription medication for non-medical reasons in the past year? never    Single Item Drug Screen Score: 0  Interpretation: Negative screen for possible drug use disorder    Other Counseling Topics:   Car/seat belt/driving safety, skin self-exam, sunscreen and regular weightbearing exercise and calcium and vitamin D intake.   Patient's shoes and socks removed.    Right Foot/Ankle   Right Foot Inspection  Skin Exam: skin normal and skin intact. No dry skin, no warmth, no callus, no erythema, no maceration, no abnormal color, no pre-ulcer, no ulcer and no callus.     Toe Exam: ROM and strength within normal  "limits.     Sensory   Monofilament testing: intact    Vascular  Capillary refills: < 3 seconds  The right DP pulse is 2+. The right PT pulse is 2+.     Left Foot/Ankle  Left Foot Inspection  Skin Exam: skin normal and skin intact. No dry skin, no warmth, no erythema, no maceration, normal color, no pre-ulcer, no ulcer and no callus.     Toe Exam: ROM and strength within normal limits.     Sensory   Monofilament testing: intact    Vascular  Capillary refills: < 3 seconds  The left DP pulse is 2+. The left PT pulse is 2+.     Assign Risk Category  No deformity present  No loss of protective sensation  No weak pulses  Risk: 0     No results found.     Physical Exam:     /60 (BP Location: Left arm, Patient Position: Sitting, Cuff Size: Adult)   Pulse 74   Temp (!) 97.3 °F (36.3 °C)   Ht 5' 3\" (1.6 m)   Wt 65 kg (143 lb 3.2 oz)   SpO2 96%   BMI 25.37 kg/m²     Physical Exam  Constitutional:       General: She is not in acute distress.     Appearance: Normal appearance. She is well-developed. She is not diaphoretic.   HENT:      Head: Normocephalic and atraumatic.      Right Ear: Tympanic membrane, ear canal and external ear normal. There is no impacted cerumen.      Left Ear: Tympanic membrane, ear canal and external ear normal. There is no impacted cerumen.      Nose: Nose normal. No congestion or rhinorrhea.      Mouth/Throat:      Mouth: Mucous membranes are moist.      Pharynx: Oropharynx is clear. No oropharyngeal exudate.   Eyes:      Conjunctiva/sclera: Conjunctivae normal.      Pupils: Pupils are equal, round, and reactive to light.   Neck:      Vascular: No JVD.   Cardiovascular:      Rate and Rhythm: Normal rate and regular rhythm.      Pulses: no weak pulses.           Dorsalis pedis pulses are 2+ on the right side and 2+ on the left side.        Posterior tibial pulses are 2+ on the right side and 2+ on the left side.      Heart sounds: Normal heart sounds. No murmur heard.     No friction rub. No " gallop.   Pulmonary:      Effort: Pulmonary effort is normal. No respiratory distress.      Breath sounds: Normal breath sounds. No wheezing or rales.   Chest:      Chest wall: No tenderness.   Abdominal:      General: Bowel sounds are normal. There is no distension.      Palpations: Abdomen is soft.      Tenderness: There is no abdominal tenderness. There is no right CVA tenderness, left CVA tenderness, guarding or rebound.   Musculoskeletal:         General: No tenderness. Normal range of motion.      Cervical back: No tenderness.   Feet:      Right foot:      Skin integrity: No ulcer, skin breakdown, erythema, warmth, callus or dry skin.      Left foot:      Skin integrity: No ulcer, skin breakdown, erythema, warmth, callus or dry skin.   Lymphadenopathy:      Cervical: No cervical adenopathy.   Skin:     General: Skin is warm and dry.   Neurological:      Mental Status: She is alert and oriented to person, place, and time.      Cranial Nerves: No cranial nerve deficit.   Psychiatric:         Mood and Affect: Mood and affect normal.         Behavior: Behavior normal.          Federico Estrada DO

## 2024-04-15 NOTE — ASSESSMENT & PLAN NOTE
Lab Results   Component Value Date    HGBA1C 7.8 (H) 04/09/2024     Improving  A1c is at goal <8 based on age  Fbs ranging  average is 110  There does appear to be a discrepancy in A1c compared to fasting blood sugars  Continue trulicity 0.75 and levemir 10u  No episodes of hypoglycemia

## 2024-04-16 NOTE — ASSESSMENT & PLAN NOTE
Well-controlled  Blood pressure today is 118/60  Denies episodes of hypotension  Continue atenolol 50 mg twice daily, Cardizem 120 mg daily, hydrochlorothiazide 12.5 mg, and lisinopril 20 mg  Under the care of cardiology  No change to current regimen

## 2024-04-16 NOTE — ASSESSMENT & PLAN NOTE
Lab Results   Component Value Date    EGFR 69 04/09/2024    EGFR 54 10/03/2023    EGFR 63 04/04/2023    CREATININE 0.82 04/09/2024    CREATININE 0.95 10/03/2023    CREATININE 0.84 04/04/2023   Waxes and wanes but appears stable  Continue current regimen of lisinopril 20 mg

## 2024-04-16 NOTE — ASSESSMENT & PLAN NOTE
Stable  Heart rate 74 today, rhythm regular  Anticoagulated on Xarelto 20 mg daily  Rate controlled with Cardizem 120 mg daily and atenolol 50 mg twice daily  Follows with cardiology

## 2024-06-08 PROBLEM — N18.2 CHRONIC KIDNEY DISEASE, STAGE II (MILD): Status: ACTIVE | Noted: 2020-10-21

## 2024-06-08 NOTE — ASSESSMENT & PLAN NOTE
Lab Results   Component Value Date    CHOLESTEROL 134 10/03/2023     Lab Results   Component Value Date    TRIG 56 10/03/2023     Lab Results   Component Value Date    HDL 53 10/03/2023     Lab Results   Component Value Date    LDLCALC 70 10/03/2023     Simvastatin 20 mg daily

## 2024-06-08 NOTE — ASSESSMENT & PLAN NOTE
10/9/2023 140/80  4/15/2024 118/60    Atenolol 50 mg daily   diltiazem CD, 120 mg, 1 daily  Hydrochlorothiazide 12.5 mg daily lisinopril 20 mg daily

## 2024-06-08 NOTE — ASSESSMENT & PLAN NOTE
Lab Results   Component Value Date    EGFR 69 04/09/2024    EGFR 54 10/03/2023    EGFR 63 04/04/2023    CREATININE 0.82 04/09/2024    CREATININE 0.95 10/03/2023    CREATININE 0.84 04/04/2023

## 2024-06-08 NOTE — ASSESSMENT & PLAN NOTE
Paroxysmal atrial fibrillation occurring in 2018 postop parathyroidectomy by Dr. Kern.  Atenolol 50 mg daily  Diltiazem  mg daily  Xarelto 20 mg daily

## 2024-06-10 ENCOUNTER — OFFICE VISIT (OUTPATIENT)
Dept: CARDIOLOGY CLINIC | Facility: CLINIC | Age: 89
End: 2024-06-10
Payer: COMMERCIAL

## 2024-06-10 VITALS
BODY MASS INDEX: 25.16 KG/M2 | HEART RATE: 73 BPM | SYSTOLIC BLOOD PRESSURE: 138 MMHG | DIASTOLIC BLOOD PRESSURE: 66 MMHG | HEIGHT: 63 IN | WEIGHT: 142 LBS

## 2024-06-10 DIAGNOSIS — I10 BENIGN ESSENTIAL HYPERTENSION: ICD-10-CM

## 2024-06-10 DIAGNOSIS — N18.2 CHRONIC KIDNEY DISEASE, STAGE II (MILD): ICD-10-CM

## 2024-06-10 DIAGNOSIS — E78.2 MIXED HYPERLIPIDEMIA: ICD-10-CM

## 2024-06-10 DIAGNOSIS — I48.0 PAROXYSMAL ATRIAL FIBRILLATION (HCC): Primary | ICD-10-CM

## 2024-06-10 PROCEDURE — 99214 OFFICE O/P EST MOD 30 MIN: CPT | Performed by: INTERNAL MEDICINE

## 2024-06-10 PROCEDURE — 93000 ELECTROCARDIOGRAM COMPLETE: CPT | Performed by: INTERNAL MEDICINE

## 2024-06-10 RX ORDER — HYDROXYZINE HYDROCHLORIDE 25 MG/1
25 TABLET, FILM COATED ORAL EVERY 6 HOURS PRN
COMMUNITY

## 2024-06-10 NOTE — PROGRESS NOTES
[facility]  [address] [place] [address]  Phone#  [phone]   Fax#  [phone]  *-*-*-*-*-*-*-*-*-*-*-*-*-*-*-*-*-*-*-*-*-*-*-*-*-*-*-*-*-*-*-*-*-*-*-*-*-*-*-*-*-*-*-*-*-*-*-*-*-*-*-*-*-*                                   Cardiology Follow Up      ENCOUNTER DATE: 06/10/24 12:14 PM  PATIENT NAME: Jenny Hawkins   : 1935    MRN: [id number]  AGE:88 y.o.      SEX: female  ENCOUNTER PROVIDER:Rohan Kim MD     PRIMARY CARE PHYSICIAN: Federico Estrada DO    CARDIOLOGY SPECIALTY COMMENTS  Patient was 1st seen by us for new onset atrial fibrillation with RVR postop parathyroidectomy by Dr. Kern. She is a 2nd admission for the same and at that time had a type 2 non ST elevation myocardial infarction. CT scan PE study was negative. Past medical history includes hypertension, hyperlipidemia and diabetes mellitus.    2018 stress test: Negative Persantine stress test. LVEF 77%, hyperdynamic ventricle. Normal tomographic perfusion series.    2018 echocardiogram: Normal left ventricular systolic function, EF 60-65%. Intermediate left ventricular diastolic function. Normal left ventricular filling pressures. Mild-to-moderate left atrial enlargement. Aortic sclerosis without stenosis.    2020 ambulatory extended monitor  Agree with preliminary report except: There is one short 12 beat run of SVT. Although some P waves are noted, there is also a short area of irregularity without defined P waves which may be atrial fibrillation.    2020 MRA of the carotid arteries: No hemodynamically significant disease of the carotid or vertebral arteries.    2020 Holter monitor: Average heart rate 66 bpm, heart rate range 56 to 86 bpm. Rare ventricular and supraventricular ectopics. No significant pauses.  No atrial fibrillation.    ACTIVE PROBLEM LIST  Patient Active Problem List   Diagnosis    Benign essential hypertension    Prolapse of anterior vaginal  wall    Fibrocystic breast disease    Hypercalcemia    Incontinence    Kyphosis    Microproteinuria    Tinnitus    Paroxysmal atrial fibrillation (HCC)    Mixed hyperlipidemia    Type 2 diabetes mellitus with stage 3a chronic kidney disease, with long-term current use of insulin (HCC)    Splenic artery aneurysm (HCC)    Thyroid nodule    Vitamin D deficiency    BMI 28.0-28.9,adult    Osteopenia of multiple sites    Chronic kidney disease, stage II (mild)    Dysequilibrium    Anxiety    Hypoalbuminemia    Adjustment disorder with anxious mood    Addison-Walker grade 2 cystocele    Type 2 diabetes mellitus with hyperglycemia (HCC)    GERD without esophagitis    Urinary urgency    Other constipation    Dysuria       ACTIVE DIAGNOSIS AND PLAN    1. Paroxysmal atrial fibrillation (HCC)  Assessment & Plan:  Paroxysmal atrial fibrillation occurring in 2018 postop parathyroidectomy by Dr. Kern.  Atenolol 50 mg daily  Diltiazem  mg daily  Xarelto 20 mg daily  Orders:  -     POCT ECG  2. Mixed hyperlipidemia  Assessment & Plan:  Lab Results   Component Value Date    CHOLESTEROL 134 10/03/2023     Lab Results   Component Value Date    TRIG 56 10/03/2023     Lab Results   Component Value Date    HDL 53 10/03/2023     Lab Results   Component Value Date    LDLCALC 70 10/03/2023     Simvastatin 20 mg daily  3. Benign essential hypertension  Assessment & Plan:  10/9/2023 140/80  4/15/2024 118/60    Atenolol 50 mg daily   diltiazem CD, 120 mg, 1 daily  Hydrochlorothiazide 12.5 mg daily lisinopril 20 mg daily  4. Chronic kidney disease, stage II (mild)  Assessment & Plan:  Lab Results   Component Value Date    EGFR 69 04/09/2024    EGFR 54 10/03/2023    EGFR 63 04/04/2023    CREATININE 0.82 04/09/2024    CREATININE 0.95 10/03/2023    CREATININE 0.84 04/04/2023        INTERVAL HISTORY:        Patient is asymptomatic.  She is active without any difficulty. She denies chest discomfort or shortness of breath.  She has no  "palpitations.  She denies symptoms of dizziness, lightheadedness or near-syncope/syncope.  She denies leg edema.  She denies symptoms of orthopnea or paroxysmal nocturnal dyspnea.      DISCUSSION/PLAN:          Continue present medications  Return in 1 year  EKG on return    Results for orders placed or performed in visit on 06/10/24   POCT ECG    Narrative    Normal sinus rhythm with first-degree AV block at a rate of 73 bpm.  Low voltage QRS.  Borderline ECG         Lab Studies:    Lab Results   Component Value Date    CHOLESTEROL 134 10/03/2023    CHOLESTEROL 143 04/04/2023    CHOLESTEROL 139 03/08/2022     Lab Results   Component Value Date    TRIG 56 10/03/2023    TRIG 63 04/04/2023    TRIG 59 03/08/2022     Lab Results   Component Value Date    HDL 53 10/03/2023    HDL 57 04/04/2023    HDL 57 03/08/2022     Lab Results   Component Value Date    LDLCALC 70 10/03/2023    LDLCALC 73 04/04/2023    LDLCALC 70 03/08/2022     Lab Results   Component Value Date    NONHDL 93 06/18/2019    NONHDL 78 12/03/2018    NONHDL 95 02/01/2018     No results found for: \"LDLDIRECT\"      Lab Results   Component Value Date    HGBA1C 7.8 (H) 04/09/2024    HGBA1C 8.0 (H) 10/03/2023    HGBA1C 7.4 (H) 04/04/2023      Lab Results   Component Value Date    EGFR 69 04/09/2024    EGFR 54 10/03/2023    EGFR 63 04/04/2023    SODIUM 140 04/09/2024    SODIUM 138 10/03/2023    SODIUM 140 04/04/2023    K 4.0 04/09/2024    K 4.1 10/03/2023    K 4.6 04/04/2023     04/09/2024     10/03/2023     04/04/2023    CO2 33 (H) 04/09/2024    CO2 31 10/03/2023    CO2 33 (H) 04/04/2023    ANIONGAP 7 05/29/2018    ANIONGAP 12 05/28/2018    ANIONGAP 8 05/24/2018    BUN 19 04/09/2024    BUN 20 10/03/2023    BUN 17 04/04/2023    CREATININE 0.82 04/09/2024    CREATININE 0.95 10/03/2023    CREATININE 0.84 04/04/2023    MG 2.3 01/16/2019    MG 2.0 05/28/2018     Lab Results   Component Value Date    WBC 10.90 (H) 01/16/2019    WBC 9.2 05/29/2018    " "WBC 9.3 05/28/2018    HGB 13.6 01/16/2019    HGB 12.8 05/29/2018    HGB 13.1 05/28/2018    HCT 42.6 01/16/2019    HCT 38.4 05/29/2018    HCT 38.3 05/28/2018    MCV 93 01/16/2019    MCV 90 05/29/2018    MCV 91 05/28/2018    MCH 29.8 01/16/2019    MCH 30.1 05/29/2018    MCH 31.0 05/28/2018    MCHC 31.9 01/16/2019    MCHC 33.5 05/29/2018    MCHC 34.2 05/28/2018     01/16/2019     05/29/2018     05/28/2018      Lab Results   Component Value Date    GLUCOSE 104 (H) 05/29/2018    GLUCOSE 281 (H) 05/28/2018    GLUCOSE 242 (H) 05/23/2018    CALCIUM 10.8 (H) 04/09/2024    CALCIUM 10.5 (H) 10/03/2023    CALCIUM 10.7 (H) 04/04/2023    ALB 4.1 04/09/2024    ALB 4.0 10/03/2023    ALB 3.8 04/04/2023    TP 7.3 04/09/2024    TP 7.5 10/03/2023    TP 6.3 (L) 04/04/2023    AST 20 04/09/2024    AST 22 10/03/2023    AST 17 04/04/2023    ALT 19 04/09/2024    ALT 20 10/03/2023    ALT 16 04/04/2023    ALKPHOS 90 04/09/2024    ALKPHOS 84 10/03/2023    ALKPHOS 94 04/04/2023    BILITOT 0.4 05/28/2018     No components found for: \"CALCIUMC\"  Lab Results   Component Value Date    TSH 1.23 10/15/2020     No components found for: \"[id number]\"  No components found for: \"[id number]\"  No results found for: \"FREET4\"  No results found for: \"DDIMER\", \"BNP\", \"NTBNP\", \"DIGOXIN\"  No results found for: \"DDIMER\"  No results found for: \"FERRITIN\", \"IRON\", \"TIBC\"  No results found for: \"CRP\", \"SEDRATE\"  No components found for: \"IRONSAT\"  No components found for: \"OCCULTBLOODSTOOL\"      Current Outpatient Medications:     atenolol (TENORMIN) 50 mg tablet, Take 1 tablet (50 mg total) by mouth 2 (two) times a day, Disp: 180 tablet, Rfl: 3    BD Pen Needle Glory U/F 32G X 4 MM MISC, Inject as directed daily Test as directed, Disp: 100 each, Rfl: 3    Blood Glucose Monitoring Suppl (OneTouch Verio Reflect) w/Device KIT, Check blood sugars once daily. Please substitute with appropriate alternative as covered by " patient's insurance. Dx: E11.65, Disp: 1 kit, Rfl: 0    diltiazem (CARDIZEM CD) 120 mg 24 hr capsule, Take 1 capsule (120 mg total) by mouth daily, Disp: 90 capsule, Rfl: 3    dulaglutide (Trulicity) 0.75 MG/0.5ML injection, Inject 0.5 mL (0.75 mg total) under the skin every 7 days, Disp: 6 mL, Rfl: 5    estradiol (ESTRACE) 0.1 mg/g vaginal cream, Insert 2 g into the vagina daily, Disp: , Rfl:     Fiber Adult Gummies 2 g CHEW, Chew 2 each in the morning, Disp: 120 tablet, Rfl: 1    glucose blood (OneTouch Verio) test strip, CHECK BLOOD SUGARS ONCE DAILY. DX: E11.65, Disp: 100 strip, Rfl: 1    hydroCHLOROthiazide 12.5 mg tablet, Take 1 tablet (12.5 mg total) by mouth daily, Disp: 90 tablet, Rfl: 3    hydrOXYzine HCL (ATARAX) 25 mg tablet, Take 25 mg by mouth every 6 (six) hours as needed for itching, Disp: , Rfl:     insulin detemir (Levemir FlexTouch) 100 Units/mL injection pen, Inject 10 Units under the skin daily at bedtime, Disp: 15 mL, Rfl: 3    Lancets (accu-chek multiclix) lancets, Use daily Use as instructed, Disp: 100 each, Rfl: 3    lisinopril (ZESTRIL) 20 mg tablet, Take 1 tablet (20 mg total) by mouth daily, Disp: 90 tablet, Rfl: 3    MICROLET LANCETS MISC, by Does not apply route daily, Disp: 50 each, Rfl: 3    OneTouch Delica Lancets 33G MISC, Check blood sugars once daily. Please substitute with appropriate alternative as covered by patient's insurance. Dx: E11.65, Disp: 100 each, Rfl: 3    rivaroxaban (Xarelto) 20 mg tablet, Take 1 tablet (20 mg total) by mouth daily with breakfast, Disp: 90 tablet, Rfl: 3    simvastatin (ZOCOR) 20 mg tablet, Take 1 tablet (20 mg total) by mouth daily, Disp: 90 tablet, Rfl: 3    Vitamin D, Cholecalciferol, 50 MCG (2000 UT) CAPS, Take 1 capsule by mouth daily, Disp: , Rfl:   Allergies   Allergen Reactions    No Active Allergies        Past Medical History:   Diagnosis Date    Anemia     IRON DEF ANEMIA DUE TO MENORRHAGIA    Diabetes mellitus (HCC)     Hypercalcemia  10/06/2010    MILD  DECREASES HCTZ    Hypertension     Melanoma (HCC) 11/2011    IN SITU  MID BACK    Palpitations 05/28/2018    SINUS TACHYCARDIA  TROPONIN SPLL    Skin cancer 2017    NOSE     Social History     Socioeconomic History    Marital status: /Civil Union     Spouse name: Not on file    Number of children: Not on file    Years of education: Not on file    Highest education level: Not on file   Occupational History    Not on file   Tobacco Use    Smoking status: Never    Smokeless tobacco: Never   Vaping Use    Vaping status: Never Used   Substance and Sexual Activity    Alcohol use: No    Drug use: No    Sexual activity: Never   Other Topics Concern    Not on file   Social History Narrative    Not on file     Social Determinants of Health     Financial Resource Strain: Low Risk  (1/11/2023)    Overall Financial Resource Strain (CARDIA)     Difficulty of Paying Living Expenses: Not hard at all   Food Insecurity: No Food Insecurity (4/15/2024)    Hunger Vital Sign     Worried About Running Out of Food in the Last Year: Never true     Ran Out of Food in the Last Year: Never true   Transportation Needs: No Transportation Needs (4/15/2024)    PRAPARE - Transportation     Lack of Transportation (Medical): No     Lack of Transportation (Non-Medical): No   Physical Activity: Not on file   Stress: Not on file   Social Connections: Not on file   Intimate Partner Violence: Not on file   Housing Stability: Low Risk  (4/15/2024)    Housing Stability Vital Sign     Unable to Pay for Housing in the Last Year: No     Number of Times Moved in the Last Year: 1     Homeless in the Last Year: No      Family History   Problem Relation Age of Onset    Other Mother         TESTED(-)    Colon cancer Father 60    Prostate cancer Father 70    Factor V Leiden deficiency Daughter     Factor V Leiden deficiency Daughter     Breast cancer Sister         in her 60's    No Known Problems Maternal Grandmother     No Known Problems  "Maternal Grandfather     No Known Problems Paternal Grandmother     No Known Problems Paternal Grandfather     No Known Problems Sister     No Known Problems Sister     No Known Problems Maternal Aunt     No Known Problems Maternal Aunt     No Known Problems Maternal Aunt     No Known Problems Maternal Aunt     No Known Problems Maternal Aunt     No Known Problems Maternal Aunt     No Known Problems Maternal Aunt     No Known Problems Paternal Aunt     No Known Problems Paternal Aunt     No Known Problems Paternal Aunt      Past Surgical History:   Procedure Laterality Date    GALLBLADDER SURGERY  1991    HYSTERECTOMY  1995    UTERINE PROLAPSE VAGINAL POLYPS    HYSTERECTOMY      IRON REPLACEMENT    OTHER SURGICAL HISTORY  11/2011    EXCISION MELANOMA IN SITU MID BACK    PARATHYROIDECTOMY  05/21/2018       PREVIOUS WEIGHTS:   Wt Readings from Last 10 Encounters:   06/10/24 64.4 kg (142 lb)   04/15/24 65 kg (143 lb 3.2 oz)   10/09/23 67.7 kg (149 lb 3.2 oz)   05/08/23 67.1 kg (148 lb)   04/10/23 67.7 kg (149 lb 3.2 oz)   01/11/23 68 kg (150 lb)   09/15/22 67.6 kg (149 lb)   07/26/22 67.4 kg (148 lb 9.6 oz)   06/22/22 67.8 kg (149 lb 6.4 oz)   04/20/22 66.7 kg (147 lb)        Review of Systems:  Review of Systems    Physical Exam:  /66   Pulse 73   Ht 5' 3\" (1.6 m)   Wt 64.4 kg (142 lb)   BMI 25.15 kg/m²     Physical Exam    -------------------------------------------------------------------------------   CARDIAC EP DEVICE REPORT  No results found for this or any previous visit.      ======================================================  Imaging:       Portions of the record may have been created with voice recognition software. Occasional wrong word or \"sound a like\" substitutions may have occurred due to the inherent limitations of voice recognition software. Read the chart carefully and recognize, using context, where substitutions have occurred.    SIGNATURES:   Rohan Kim MD   "

## 2024-08-02 DIAGNOSIS — Z79.4 TYPE 2 DIABETES MELLITUS WITH STAGE 3A CHRONIC KIDNEY DISEASE, WITH LONG-TERM CURRENT USE OF INSULIN (HCC): ICD-10-CM

## 2024-08-02 DIAGNOSIS — E11.65 TYPE 2 DIABETES MELLITUS WITH HYPERGLYCEMIA, WITH LONG-TERM CURRENT USE OF INSULIN (HCC): ICD-10-CM

## 2024-08-02 DIAGNOSIS — E11.22 TYPE 2 DIABETES MELLITUS WITH STAGE 3A CHRONIC KIDNEY DISEASE, WITH LONG-TERM CURRENT USE OF INSULIN (HCC): ICD-10-CM

## 2024-08-02 DIAGNOSIS — Z79.4 TYPE 2 DIABETES MELLITUS WITH HYPERGLYCEMIA, WITH LONG-TERM CURRENT USE OF INSULIN (HCC): ICD-10-CM

## 2024-08-02 DIAGNOSIS — N18.31 TYPE 2 DIABETES MELLITUS WITH STAGE 3A CHRONIC KIDNEY DISEASE, WITH LONG-TERM CURRENT USE OF INSULIN (HCC): ICD-10-CM

## 2024-08-02 RX ORDER — BLOOD SUGAR DIAGNOSTIC
STRIP MISCELLANEOUS
Qty: 100 STRIP | Refills: 1 | Status: SHIPPED | OUTPATIENT
Start: 2024-08-02

## 2024-08-12 DIAGNOSIS — Z79.4 TYPE 2 DIABETES MELLITUS WITH HYPERGLYCEMIA, WITH LONG-TERM CURRENT USE OF INSULIN (HCC): ICD-10-CM

## 2024-08-12 DIAGNOSIS — E11.65 TYPE 2 DIABETES MELLITUS WITH HYPERGLYCEMIA, WITH LONG-TERM CURRENT USE OF INSULIN (HCC): ICD-10-CM

## 2024-08-13 RX ORDER — INSULIN DETEMIR 100 [IU]/ML
10 INJECTION, SOLUTION SUBCUTANEOUS
Qty: 15 ML | Refills: 0 | Status: SHIPPED | OUTPATIENT
Start: 2024-08-13 | End: 2024-08-15

## 2024-08-15 ENCOUNTER — TELEPHONE (OUTPATIENT)
Age: 89
End: 2024-08-15

## 2024-08-15 DIAGNOSIS — Z00.00 MEDICARE ANNUAL WELLNESS VISIT, SUBSEQUENT: ICD-10-CM

## 2024-08-15 DIAGNOSIS — E11.22 TYPE 2 DIABETES MELLITUS WITH STAGE 3A CHRONIC KIDNEY DISEASE, WITH LONG-TERM CURRENT USE OF INSULIN (HCC): ICD-10-CM

## 2024-08-15 DIAGNOSIS — N18.31 TYPE 2 DIABETES MELLITUS WITH STAGE 3A CHRONIC KIDNEY DISEASE, WITH LONG-TERM CURRENT USE OF INSULIN (HCC): ICD-10-CM

## 2024-08-15 DIAGNOSIS — Z79.4 TYPE 2 DIABETES MELLITUS WITH STAGE 3A CHRONIC KIDNEY DISEASE, WITH LONG-TERM CURRENT USE OF INSULIN (HCC): ICD-10-CM

## 2024-08-15 DIAGNOSIS — Z79.4 TYPE 2 DIABETES MELLITUS WITH HYPERGLYCEMIA, WITH LONG-TERM CURRENT USE OF INSULIN (HCC): Primary | ICD-10-CM

## 2024-08-15 DIAGNOSIS — E11.65 TYPE 2 DIABETES MELLITUS WITH HYPERGLYCEMIA, WITH LONG-TERM CURRENT USE OF INSULIN (HCC): Primary | ICD-10-CM

## 2024-08-15 RX ORDER — INSULIN GLARGINE 100 [IU]/ML
10 INJECTION, SOLUTION SUBCUTANEOUS DAILY
Qty: 3 ML | Refills: 2 | Status: SHIPPED | OUTPATIENT
Start: 2024-08-15 | End: 2024-11-13

## 2024-08-15 NOTE — TELEPHONE ENCOUNTER
The patient's insurance company notified her that they discontinued insulin detemir (Levemir FlexPen) 100 Units/mL injection pen. She is requesting a call back on what to do next.

## 2024-10-10 DIAGNOSIS — E78.49 OTHER HYPERLIPIDEMIA: ICD-10-CM

## 2024-10-10 RX ORDER — SIMVASTATIN 20 MG
20 TABLET ORAL DAILY
Qty: 90 TABLET | Refills: 0 | Status: SHIPPED | OUTPATIENT
Start: 2024-10-10

## 2024-10-16 DIAGNOSIS — N18.1 TYPE 2 DIABETES MELLITUS WITH STAGE 1 CHRONIC KIDNEY DISEASE, WITH LONG-TERM CURRENT USE OF INSULIN (HCC): ICD-10-CM

## 2024-10-16 DIAGNOSIS — Z79.4 TYPE 2 DIABETES MELLITUS WITH STAGE 1 CHRONIC KIDNEY DISEASE, WITH LONG-TERM CURRENT USE OF INSULIN (HCC): ICD-10-CM

## 2024-10-16 DIAGNOSIS — E11.22 TYPE 2 DIABETES MELLITUS WITH STAGE 1 CHRONIC KIDNEY DISEASE, WITH LONG-TERM CURRENT USE OF INSULIN (HCC): ICD-10-CM

## 2024-10-16 NOTE — TELEPHONE ENCOUNTER
Reason for call:   [x] Refill   [] Prior Auth  [] Other:     Office:   [x] PCP/Provider -   [] Specialty/Provider -     Medication: (Trulicity) 0.75 MG/0.5ML injection     Dose/Frequency:  Inject 0.5 mL (0.75 mg total) under the skin every 7 days     Quantity: 6 mL    Pharmacy: [facility] [id number] [place] [address] [phone]    Does the patient have enough for 3 days?   [x] Yes   [] No - Send as HP to POD

## 2024-10-22 LAB
ALBUMIN SERPL-MCNC: 3.7 G/DL (ref 3.5–5.7)
ALP SERPL-CCNC: 85 U/L (ref 35–120)
ALT SERPL-CCNC: 22 U/L
ANION GAP SERPL CALCULATED.3IONS-SCNC: 8 MMOL/L (ref 3–11)
AST SERPL-CCNC: 22 U/L
BILIRUB SERPL-MCNC: 0.7 MG/DL (ref 0.2–1)
BUN SERPL-MCNC: 29 MG/DL (ref 7–25)
CALCIUM SERPL-MCNC: 10.6 MG/DL (ref 8.5–10.5)
CHLORIDE SERPL-SCNC: 102 MMOL/L (ref 100–109)
CO2 SERPL-SCNC: 31 MMOL/L (ref 21–31)
CREAT SERPL-MCNC: 0.87 MG/DL (ref 0.4–1.1)
CYTOLOGY CMNT CVX/VAG CYTO-IMP: ABNORMAL
EST. AVERAGE GLUCOSE BLD GHB EST-MCNC: 171 MG/DL
GFR/BSA.PRED SERPLBLD CYS-BASED-ARV: 64 ML/MIN/{1.73_M2}
GLUCOSE SERPL-MCNC: 104 MG/DL (ref 65–99)
HBA1C MFR BLD: 7.6 %
POTASSIUM SERPL-SCNC: 3.7 MMOL/L (ref 3.5–5.2)
PROT SERPL-MCNC: 7 G/DL (ref 6.3–8.3)
PTH-INTACT SERPL-MCNC: 94.1 PG/ML (ref 12–88)
SODIUM SERPL-SCNC: 141 MMOL/L (ref 135–145)

## 2024-10-23 LAB
ALBUMIN/CREAT UR: 14.9
CREAT UR-MCNC: 60.5 MG/DL (ref 50–200)
MICROALBUMIN UR-MCNC: 0.9 MG/DL

## 2024-10-28 ENCOUNTER — OFFICE VISIT (OUTPATIENT)
Dept: FAMILY MEDICINE CLINIC | Facility: CLINIC | Age: 89
End: 2024-10-28
Payer: COMMERCIAL

## 2024-10-28 VITALS
TEMPERATURE: 97.5 F | HEART RATE: 75 BPM | OXYGEN SATURATION: 97 % | DIASTOLIC BLOOD PRESSURE: 82 MMHG | RESPIRATION RATE: 18 BRPM | HEIGHT: 63 IN | BODY MASS INDEX: 25.48 KG/M2 | WEIGHT: 143.8 LBS | SYSTOLIC BLOOD PRESSURE: 120 MMHG

## 2024-10-28 DIAGNOSIS — E78.2 MIXED HYPERLIPIDEMIA: ICD-10-CM

## 2024-10-28 DIAGNOSIS — E11.22 TYPE 2 DIABETES MELLITUS WITH STAGE 3A CHRONIC KIDNEY DISEASE, WITH LONG-TERM CURRENT USE OF INSULIN (HCC): ICD-10-CM

## 2024-10-28 DIAGNOSIS — I10 BENIGN ESSENTIAL HYPERTENSION: Primary | ICD-10-CM

## 2024-10-28 DIAGNOSIS — E21.3 HYPERPARATHYROIDISM (HCC): ICD-10-CM

## 2024-10-28 DIAGNOSIS — E11.65 TYPE 2 DIABETES MELLITUS WITH HYPERGLYCEMIA, WITH LONG-TERM CURRENT USE OF INSULIN (HCC): ICD-10-CM

## 2024-10-28 DIAGNOSIS — Z79.4 TYPE 2 DIABETES MELLITUS WITH HYPERGLYCEMIA, WITH LONG-TERM CURRENT USE OF INSULIN (HCC): ICD-10-CM

## 2024-10-28 DIAGNOSIS — Z79.4 TYPE 2 DIABETES MELLITUS WITH STAGE 3A CHRONIC KIDNEY DISEASE, WITH LONG-TERM CURRENT USE OF INSULIN (HCC): ICD-10-CM

## 2024-10-28 DIAGNOSIS — I48.0 PAROXYSMAL ATRIAL FIBRILLATION (HCC): ICD-10-CM

## 2024-10-28 DIAGNOSIS — Z23 ENCOUNTER FOR IMMUNIZATION: ICD-10-CM

## 2024-10-28 DIAGNOSIS — N18.31 TYPE 2 DIABETES MELLITUS WITH STAGE 3A CHRONIC KIDNEY DISEASE, WITH LONG-TERM CURRENT USE OF INSULIN (HCC): ICD-10-CM

## 2024-10-28 DIAGNOSIS — E83.52 HYPERCALCEMIA: ICD-10-CM

## 2024-10-28 PROCEDURE — 90662 IIV NO PRSV INCREASED AG IM: CPT | Performed by: FAMILY MEDICINE

## 2024-10-28 PROCEDURE — 99214 OFFICE O/P EST MOD 30 MIN: CPT | Performed by: FAMILY MEDICINE

## 2024-10-28 PROCEDURE — G0008 ADMIN INFLUENZA VIRUS VAC: HCPCS | Performed by: FAMILY MEDICINE

## 2024-10-28 NOTE — ASSESSMENT & PLAN NOTE
Well controlled  Bp today 120/82  Due to elevations of calcium will stop hydrochlorothiazide and monitor for improvement  Continue home bp monitoring

## 2024-10-28 NOTE — ASSESSMENT & PLAN NOTE
Had excision of 1 parathyroid adenoma removed 5/21/2018  She does not want any further surgeries  Will stop hctz and monitor for improvement  Declines referral to endocrinology or surgery at this moment

## 2024-10-28 NOTE — ASSESSMENT & PLAN NOTE
Lab Results   Component Value Date    HGBA1C 7.6 (H) 10/22/2024     Improving   Patient cannot afford trulicity and will stop in the next 3 months  Will continue lantus 10u  May start sglt2 inhibitor or sulfonurea  Previously on prandin  Follow up in 6 months

## 2024-10-28 NOTE — ASSESSMENT & PLAN NOTE
Lab Results   Component Value Date    HGBA1C 7.6 (H) 10/22/2024       Improving  She started taking her lantus 10u in the morning  Continue lantus 10u qam and trulicity 0.75mg  Trulicity is too expensive and after 3 months when she runs out will stop and switch to glipizide

## 2024-10-28 NOTE — PROGRESS NOTES
Assessment/Plan:      1. Benign essential hypertension  Assessment & Plan:  Well controlled  Bp today 120/82  Due to elevations of calcium will stop hydrochlorothiazide and monitor for improvement  Continue home bp monitoring  Orders:  -     Comprehensive metabolic panel; Future; Expected date: 04/26/2025  2. Paroxysmal atrial fibrillation (HCC)  3. Hypercalcemia  -     Calcium, ionized; Future; Expected date: 04/28/2025  -     PTH, intact; Future; Expected date: 01/28/2025  4. Type 2 diabetes mellitus with hyperglycemia, with long-term current use of insulin ([facility])  Assessment & Plan:    Lab Results   Component Value Date    HGBA1C 7.6 (H) 10/22/2024     Improving   Patient cannot afford trulicity and will stop in the next 3 months  Will continue lantus 10u  May start sglt2 inhibitor or sulfonurea  Previously on prandin  Follow up in 6 months  Orders:  -     Hemoglobin A1C; Future; Expected date: 04/26/2025  5. Type 2 diabetes mellitus with stage 3a chronic kidney disease, with long-term current use of insulin ([facility])  Assessment & Plan:    Lab Results   Component Value Date    HGBA1C 7.6 (H) 10/22/2024       Improving  She started taking her lantus 10u in the morning  Continue lantus 10u qam and trulicity 0.75mg  Trulicity is too expensive and after 3 months when she runs out will stop and switch to glipizide   Orders:  -     Hemoglobin A1C; Future; Expected date: 04/26/2025  6. Mixed hyperlipidemia  Assessment & Plan:  Lab Results   Component Value Date    LDLCALC 70 10/03/2023     Stable  Continue simvastatin 20mg  7. Hyperparathyroidism (HCC)  Assessment & Plan:  Had excision of 1 parathyroid adenoma removed 5/21/2018  She does not want any further surgeries  Will stop hctz and monitor for improvement  Declines referral to endocrinology or surgery at this moment  Orders:  -     Calcium, ionized; Future; Expected date: 04/28/2025  -     PTH, intact; Future; Expected date: 01/28/2025  8. Encounter for  "immunization  -     influenza vaccine, high-dose, PF 0.5 mL (Fluzone High Dose)          Subjective:      Patient ID: Jenny Hawkins is a 89 y.o. female presents today for routine follow up.  She reports wanting to trial stopping hydrochlorothiazide.    HPI    The following portions of the patient's history were reviewed and updated as appropriate: allergies, current medications, past family history, past medical history, past social history, past surgical history, and problem list.    Review of Systems   Constitutional:  Negative for activity change, chills, diaphoresis and fever.   HENT:  Negative for ear pain, hearing loss, postnasal drip, rhinorrhea, sinus pressure, sinus pain, sneezing and sore throat.    Respiratory:  Negative for cough, chest tightness, shortness of breath and wheezing.    Cardiovascular:  Negative for chest pain, palpitations and leg swelling.   Gastrointestinal:  Negative for abdominal pain, blood in stool, constipation, diarrhea, nausea and vomiting.   Genitourinary:  Negative for dysuria, frequency, hematuria and urgency.   Musculoskeletal:  Negative for arthralgias and myalgias.   Neurological:  Negative for dizziness, syncope, weakness, light-headedness, numbness and headaches.         Objective:      /82 (BP Location: Left arm, Patient Position: Sitting, Cuff Size: Standard)   Pulse 75   Temp 97.5 °F (36.4 °C) (Temporal)   Resp 18   Ht 5' 3\" (1.6 m)   Wt 65.2 kg (143 lb 12.8 oz)   SpO2 97%   BMI 25.47 kg/m²          Physical Exam  Vitals reviewed.   Constitutional:       General: She is not in acute distress.     Appearance: She is well-developed. She is not diaphoretic.   HENT:      Head: Normocephalic and atraumatic.      Right Ear: External ear normal.      Left Ear: External ear normal.      Nose: Nose normal. No congestion.      Mouth/Throat:      Mouth: Mucous membranes are moist.      Pharynx: Oropharynx is clear. No oropharyngeal exudate.   Eyes:      General: No " scleral icterus.        Right eye: No discharge.         Left eye: No discharge.      Conjunctiva/sclera: Conjunctivae normal.      Pupils: Pupils are equal, round, and reactive to light.   Neck:      Thyroid: No thyromegaly.      Vascular: No JVD.      Trachea: No tracheal deviation.   Cardiovascular:      Rate and Rhythm: Normal rate and regular rhythm.      Heart sounds: Normal heart sounds. No murmur heard.     No friction rub. No gallop.   Pulmonary:      Effort: Pulmonary effort is normal. No respiratory distress.      Breath sounds: Normal breath sounds. No wheezing or rales.   Chest:      Chest wall: No tenderness.   Abdominal:      General: Bowel sounds are normal. There is no distension.      Palpations: Abdomen is soft.      Tenderness: There is no abdominal tenderness. There is no right CVA tenderness, left CVA tenderness, guarding or rebound.   Musculoskeletal:         General: Normal range of motion.      Cervical back: Normal range of motion and neck supple. No tenderness.      Right lower leg: No edema.      Left lower leg: No edema.   Lymphadenopathy:      Cervical: No cervical adenopathy.   Skin:     General: Skin is warm and dry.   Neurological:      Mental Status: She is alert and oriented to person, place, and time. Mental status is at baseline.   Psychiatric:         Mood and Affect: Mood normal.         Behavior: Behavior normal.         Thought Content: Thought content normal.         Judgment: Judgment normal.

## 2024-12-18 ENCOUNTER — TELEPHONE (OUTPATIENT)
Dept: ADMINISTRATIVE | Facility: OTHER | Age: 89
End: 2024-12-18

## 2024-12-18 NOTE — TELEPHONE ENCOUNTER
----- Message from Faye MARIEE sent at 12/18/2024 10:35 AM EST -----  Regarding: Care Gap Request  12/18/24 10:35 AM    Hello, our patient attached above has had Diabetic Eye Exam completed/performed. Please assist in updating the patient chart by pulling the document from the Media Tab. The date of service is 10/30/23.     Thank you,  Faye Rainey [facility]  [facility] [place]

## 2024-12-18 NOTE — TELEPHONE ENCOUNTER
Upon review of the In Basket request we were able to locate, review, and update the patient chart as requested for Diabetic Eye Exam.    Any additional questions or concerns should be emailed to the Practice Liaisons via the appropriate education email address, please do not reply via In Basket.    Thank you  Kerry Farrell   [facility]

## 2024-12-26 ENCOUNTER — TELEPHONE (OUTPATIENT)
Age: 89
End: 2024-12-26

## 2024-12-26 NOTE — TELEPHONE ENCOUNTER
As per pcp , medication was discontinue due to it being a high out of pocket expense for pt.    As per pcp she can probably be prescribed jardiance or glipizide , must schedule an appt to discuss

## 2024-12-26 NOTE — TELEPHONE ENCOUNTER
Patient called in requesting a phone call from the PCP about medication problem patient is waiting for a call back at [phone] Thank You

## 2024-12-26 NOTE — TELEPHONE ENCOUNTER
Patient called in requesting a phone call from the PCP about medication problem patient is waiting for a call back at [phone] Thank You

## 2025-01-03 ENCOUNTER — TELEMEDICINE (OUTPATIENT)
Dept: FAMILY MEDICINE CLINIC | Facility: CLINIC | Age: 89
End: 2025-01-03
Payer: COMMERCIAL

## 2025-01-03 DIAGNOSIS — E11.29 MICROALBUMINURIA DUE TO TYPE 2 DIABETES MELLITUS  (HCC): ICD-10-CM

## 2025-01-03 DIAGNOSIS — E11.65 TYPE 2 DIABETES MELLITUS WITH HYPERGLYCEMIA, WITH LONG-TERM CURRENT USE OF INSULIN (HCC): Primary | ICD-10-CM

## 2025-01-03 DIAGNOSIS — Z79.4 TYPE 2 DIABETES MELLITUS WITH HYPERGLYCEMIA, WITH LONG-TERM CURRENT USE OF INSULIN (HCC): Primary | ICD-10-CM

## 2025-01-03 DIAGNOSIS — R80.9 MICROALBUMINURIA DUE TO TYPE 2 DIABETES MELLITUS  (HCC): ICD-10-CM

## 2025-01-03 PROCEDURE — G2211 COMPLEX E/M VISIT ADD ON: HCPCS | Performed by: FAMILY MEDICINE

## 2025-01-03 PROCEDURE — 99213 OFFICE O/P EST LOW 20 MIN: CPT | Performed by: FAMILY MEDICINE

## 2025-01-03 NOTE — PROGRESS NOTES
Virtual Brief Visit  Name: Jenny Hawkins      : 1935      MRN: [id number]  Encounter Provider: Federico Estrada DO  Encounter Date: 1/3/2025   Encounter department: [facility]    This Visit is being completed by telephone. The Patient is located at Home and in the following state in which I hold an active license [place]    The patient was identified by name and date of birth. Jenny Hawkins was informed that this is a telemedicine visit and that the visit is being conducted through the [employer] Teams platform. She agrees to proceed..  My office door was closed. No one else was in the room.  She acknowledged consent and understanding of privacy and security of the video platform. The patient has agreed to participate and understands they can discontinue the visit at any time.    Patient is aware this is a billable service.     :  Assessment & Plan  Type 2 diabetes mellitus with hyperglycemia, with long-term current use of insulin (formerly [facility])    Lab Results   Component Value Date    HGBA1C 7.6 (H) 10/22/2024     Orders:  •  Empagliflozin (JARDIANCE) 10 MG TABS tablet; Take 1 tablet (10 mg total) by mouth daily  •  Hemoglobin A1C; Future    Microalbuminuria due to type 2 diabetes mellitus  (formerly [facility])    Lab Results   Component Value Date    HGBA1C 7.6 (H) 10/22/2024     Orders:  •  Empagliflozin (JARDIANCE) 10 MG TABS tablet; Take 1 tablet (10 mg total) by mouth daily  •  Albumin / creatinine urine ratio; Future    Type 2 diabetes mellitus with hyperglycemia, with long-term current use of insulin (formerly [facility])    Lab Results   Component Value Date    HGBA1C 7.6 (H) 10/22/2024     Waxes and wanes  She can no longer offord trulicity (prices range 100-600$ per month.  Fbs range 130-140  Patient has history of microalbuminuria  Will stop trulicity and start jardiance 10mg  Patient will call office if jardiance is too expensive and will then try glipizide.  Due for repeat A1c and urine albumin in  1 month             History of Present Illness   HPI presents today to discuss type 2 diabetes regimen. She can no longer afford trulicity.     Visit Time  Total Visit Duration: 20

## 2025-01-03 NOTE — ASSESSMENT & PLAN NOTE
Lab Results   Component Value Date    HGBA1C 7.6 (H) 10/22/2024     Orders:  •  Empagliflozin (JARDIANCE) 10 MG TABS tablet; Take 1 tablet (10 mg total) by mouth daily  •  Hemoglobin A1C; Future

## 2025-01-06 DIAGNOSIS — E78.49 OTHER HYPERLIPIDEMIA: ICD-10-CM

## 2025-01-06 NOTE — TELEPHONE ENCOUNTER
Reason for call:   [x] Refill   [] Prior Auth  [] Other:     Office:   [x] PCP/Provider -   [] Specialty/Provider -     Medication:   - Simvastatin 20mg- take 1 tablet by mouth every day      [facility] [place]    Does the patient have enough for 3 days?   [x] Yes   [] No - Send as HP to POD

## 2025-01-07 RX ORDER — SIMVASTATIN 20 MG
20 TABLET ORAL DAILY
Qty: 90 TABLET | Refills: 1 | Status: SHIPPED | OUTPATIENT
Start: 2025-01-07

## 2025-01-09 ENCOUNTER — TELEPHONE (OUTPATIENT)
Age: 89
End: 2025-01-09

## 2025-01-09 NOTE — TELEPHONE ENCOUNTER
Patient called stating she has a question on the new medication, Empagliflozin (JARDIANCE) 10 MG TABS tablet.    Patient states she is also taking lantis and on the box for lantis, it states not to mix with other insulin.     Patient is questioning if it is safe to take lantis with Empagliflozin (JARDIANCE) 10 MG TABS tablet.    Please advise and return patients call at  [phone].

## 2025-01-27 ENCOUNTER — TELEPHONE (OUTPATIENT)
Age: 89
End: 2025-01-27

## 2025-01-27 NOTE — TELEPHONE ENCOUNTER
Last visit 01/03/2025  Next appt 02/03/2025    Patient is asking for her lab order to be faxed over to [facility] near your office. Please fax to number below. Patient will be going tomorrow at 7:30 am. Thank you.      [facility]  [address] [place] [address]  [employer] [address]  Ph: [phone]  Fax: [phone]

## 2025-01-29 ENCOUNTER — RA CDI HCC (OUTPATIENT)
Dept: OTHER | Facility: HOSPITAL | Age: 89
End: 2025-01-29

## 2025-01-29 NOTE — PROGRESS NOTES
HCC coding opportunities       Chart reviewed, no opportunity found: CHART REVIEWED, NO OPPORTUNITY FOUND        Patients Insurance     Medicare Insurance: [employer]          This is a reminder to assess all HCC (risk adjustment) codes for the year 2025 as patients COLETTE scores reset to zero with the New year.    Also, just a reminder to please review and assess all other chronic conditions for 2025.

## 2025-01-31 LAB
ALBUMIN/CREAT UR: 64.2
CREAT UR-MCNC: 65.4 MG/DL (ref 50–200)
EST. AVERAGE GLUCOSE BLD GHB EST-MCNC: 166 MG/DL
HBA1C MFR BLD: 7.4 %
MICROALBUMIN UR-MCNC: 4.2 MG/DL

## 2025-02-03 ENCOUNTER — OFFICE VISIT (OUTPATIENT)
Dept: FAMILY MEDICINE CLINIC | Facility: CLINIC | Age: 89
End: 2025-02-03
Payer: COMMERCIAL

## 2025-02-03 VITALS
TEMPERATURE: 97.9 F | BODY MASS INDEX: 24.63 KG/M2 | WEIGHT: 139 LBS | SYSTOLIC BLOOD PRESSURE: 146 MMHG | OXYGEN SATURATION: 96 % | HEIGHT: 63 IN | DIASTOLIC BLOOD PRESSURE: 80 MMHG | HEART RATE: 66 BPM

## 2025-02-03 DIAGNOSIS — I48.0 PAROXYSMAL ATRIAL FIBRILLATION (HCC): ICD-10-CM

## 2025-02-03 DIAGNOSIS — Z79.4 TYPE 2 DIABETES MELLITUS WITH STAGE 3A CHRONIC KIDNEY DISEASE, WITH LONG-TERM CURRENT USE OF INSULIN (HCC): Primary | ICD-10-CM

## 2025-02-03 DIAGNOSIS — E21.3 HYPERPARATHYROIDISM (HCC): ICD-10-CM

## 2025-02-03 DIAGNOSIS — M85.89 OSTEOPENIA OF MULTIPLE SITES: ICD-10-CM

## 2025-02-03 DIAGNOSIS — E11.22 TYPE 2 DIABETES MELLITUS WITH STAGE 3A CHRONIC KIDNEY DISEASE, WITH LONG-TERM CURRENT USE OF INSULIN (HCC): Primary | ICD-10-CM

## 2025-02-03 DIAGNOSIS — I72.8 SPLENIC ARTERY ANEURYSM (HCC): ICD-10-CM

## 2025-02-03 DIAGNOSIS — R80.9 ALBUMINURIA: ICD-10-CM

## 2025-02-03 DIAGNOSIS — N18.31 TYPE 2 DIABETES MELLITUS WITH STAGE 3A CHRONIC KIDNEY DISEASE, WITH LONG-TERM CURRENT USE OF INSULIN (HCC): Primary | ICD-10-CM

## 2025-02-03 PROCEDURE — G2211 COMPLEX E/M VISIT ADD ON: HCPCS | Performed by: FAMILY MEDICINE

## 2025-02-03 PROCEDURE — 99214 OFFICE O/P EST MOD 30 MIN: CPT | Performed by: FAMILY MEDICINE

## 2025-02-03 RX ORDER — DILTIAZEM HYDROCHLORIDE 120 MG/1
120 CAPSULE, COATED, EXTENDED RELEASE ORAL DAILY
Qty: 90 CAPSULE | Refills: 1 | Status: SHIPPED | OUTPATIENT
Start: 2025-02-03

## 2025-02-03 RX ORDER — INSULIN GLARGINE 100 [IU]/ML
10 INJECTION, SOLUTION SUBCUTANEOUS
Qty: 9 ML | Refills: 1 | Status: SHIPPED | OUTPATIENT
Start: 2025-02-03 | End: 2025-08-02

## 2025-02-03 NOTE — ASSESSMENT & PLAN NOTE
Lab Results   Component Value Date    HGBA1C 7.4 (H) 01/31/2025     Somewhat improving  Tolerating jardiance 10mg and lantus 10u qhs  Continue current regimen  Encouraged hydration in the setting of jardiance  Repeat A1c cmp and urine albumin at next visit  Orders:  •  Insulin Glargine Solostar (Lantus SoloStar) 100 UNIT/ML SOPN; Inject 0.1 mL (10 Units total) under the skin daily at bedtime  •  Hemoglobin A1C; Future  •  Comprehensive metabolic panel; Future

## 2025-02-03 NOTE — ASSESSMENT & PLAN NOTE
Stable  Anticoagulated on xarelto 20mg  Rate controlled on atenolol 50mg bid and cardizem 120mg  Under care of Dr. Kim CArdiology
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
## 2025-02-11 DIAGNOSIS — E11.65 TYPE 2 DIABETES MELLITUS WITH HYPERGLYCEMIA, WITH LONG-TERM CURRENT USE OF INSULIN (HCC): ICD-10-CM

## 2025-02-11 DIAGNOSIS — I48.91 ATRIAL FIBRILLATION, UNSPECIFIED TYPE (HCC): ICD-10-CM

## 2025-02-11 DIAGNOSIS — I10 BENIGN ESSENTIAL HYPERTENSION: ICD-10-CM

## 2025-02-11 DIAGNOSIS — Z79.4 TYPE 2 DIABETES MELLITUS WITH HYPERGLYCEMIA, WITH LONG-TERM CURRENT USE OF INSULIN (HCC): ICD-10-CM

## 2025-02-11 DIAGNOSIS — R80.9 MICROALBUMINURIA DUE TO TYPE 2 DIABETES MELLITUS  (HCC): ICD-10-CM

## 2025-02-11 DIAGNOSIS — E11.29 MICROALBUMINURIA DUE TO TYPE 2 DIABETES MELLITUS  (HCC): ICD-10-CM

## 2025-02-11 NOTE — TELEPHONE ENCOUNTER
Reason for call:   [x] Refill   [] Prior Auth  [x] Other: Not duplicates patient is requesting 90 days, and requesting ahead so the pharmacy has new scripts when they are due.    Office:   [x] PCP/Provider -   Ordering Department: [facility] [place]  Authorized By: Federico Estrada DO  [] Specialty/Provider -     Medication:  Empagliflozin (JARDIANCE) 10 MG TABS tablet  Dose/Frequency: Take 1 tablet (10 mg total) by mouth daily   Quantity: 30    Medication: lisinopril (ZESTRIL) 20 mg tablet   Dose/Frequency:  Take 1 tablet (20 mg total) by mouth daily,   Quantity: 90    Medication: rivaroxaban (Xarelto) 20 mg tablet   Dose/Frequency: rivaroxaban (Xarelto) 20 mg tablet   Quantity: 90    Pharmacy: [address] [phone]    Does the patient have enough for 3 days?   [x] Yes   [] No - Send as HP to POD

## 2025-02-12 RX ORDER — LISINOPRIL 20 MG/1
20 TABLET ORAL DAILY
Qty: 90 TABLET | Refills: 1 | Status: SHIPPED | OUTPATIENT
Start: 2025-02-12

## 2025-04-23 ENCOUNTER — TELEPHONE (OUTPATIENT)
Age: 89
End: 2025-04-23

## 2025-04-23 NOTE — TELEPHONE ENCOUNTER
Patient calls to report that she has a 6 month follow up appointment on 5/5/25. Patient will be going for her lab work on 5/1/25. Patient uses the [facility] on [facility]. Patient is asking if her lab slips can be faxed over to the [facility] lab so that she can have her lab work completed. If there are any questions the patient can be reached at [phone].

## 2025-04-30 DIAGNOSIS — I48.91 ATRIAL FIBRILLATION, UNSPECIFIED TYPE (HCC): ICD-10-CM

## 2025-04-30 NOTE — TELEPHONE ENCOUNTER
Reason for call:   [x] Refill   [] Prior Auth  [] Other:     Office:   [x] PCP/Provider - [facility] Estrada [facility]   [] Specialty/Provider -     Medication: Xarelto    Dose/Frequency: 20 mg     Quantity: #90    [facility] [place]    Local Pharmacy   Does the patient have enough for 3 days?   [x] Yes   [] No - Send as HP to POD    Mail Away Pharmacy   Does the patient have enough for 10 days?   [] Yes   [] No - Send as HP to POD

## 2025-05-05 ENCOUNTER — OFFICE VISIT (OUTPATIENT)
Dept: FAMILY MEDICINE CLINIC | Facility: CLINIC | Age: 89
End: 2025-05-05
Payer: COMMERCIAL

## 2025-05-05 VITALS
DIASTOLIC BLOOD PRESSURE: 80 MMHG | HEART RATE: 69 BPM | TEMPERATURE: 98.7 F | SYSTOLIC BLOOD PRESSURE: 138 MMHG | OXYGEN SATURATION: 97 % | HEIGHT: 63 IN | BODY MASS INDEX: 24.52 KG/M2 | WEIGHT: 138.4 LBS

## 2025-05-05 DIAGNOSIS — E78.2 MIXED HYPERLIPIDEMIA: ICD-10-CM

## 2025-05-05 DIAGNOSIS — Z79.4 TYPE 2 DIABETES MELLITUS WITH STAGE 3A CHRONIC KIDNEY DISEASE, WITH LONG-TERM CURRENT USE OF INSULIN (HCC): ICD-10-CM

## 2025-05-05 DIAGNOSIS — E11.9 TYPE 2 DIABETES MELLITUS WITHOUT COMPLICATION, UNSPECIFIED WHETHER LONG TERM INSULIN USE (HCC): ICD-10-CM

## 2025-05-05 DIAGNOSIS — R80.9 MICROALBUMINURIA DUE TO TYPE 2 DIABETES MELLITUS  (HCC): ICD-10-CM

## 2025-05-05 DIAGNOSIS — Z00.00 MEDICARE ANNUAL WELLNESS VISIT, SUBSEQUENT: Primary | ICD-10-CM

## 2025-05-05 DIAGNOSIS — E11.29 MICROALBUMINURIA DUE TO TYPE 2 DIABETES MELLITUS  (HCC): ICD-10-CM

## 2025-05-05 DIAGNOSIS — I48.0 PAROXYSMAL ATRIAL FIBRILLATION (HCC): ICD-10-CM

## 2025-05-05 DIAGNOSIS — K21.9 GERD WITHOUT ESOPHAGITIS: ICD-10-CM

## 2025-05-05 DIAGNOSIS — R80.9 MICROPROTEINURIA: ICD-10-CM

## 2025-05-05 DIAGNOSIS — I72.8 SPLENIC ARTERY ANEURYSM (HCC): ICD-10-CM

## 2025-05-05 DIAGNOSIS — E55.9 VITAMIN D DEFICIENCY: ICD-10-CM

## 2025-05-05 DIAGNOSIS — I10 BENIGN ESSENTIAL HYPERTENSION: ICD-10-CM

## 2025-05-05 DIAGNOSIS — N81.10 BADEN-WALKER GRADE 2 CYSTOCELE: ICD-10-CM

## 2025-05-05 DIAGNOSIS — E11.22 TYPE 2 DIABETES MELLITUS WITH STAGE 3A CHRONIC KIDNEY DISEASE, WITH LONG-TERM CURRENT USE OF INSULIN (HCC): ICD-10-CM

## 2025-05-05 DIAGNOSIS — F43.22 ADJUSTMENT DISORDER WITH ANXIOUS MOOD: ICD-10-CM

## 2025-05-05 DIAGNOSIS — N18.2 CHRONIC KIDNEY DISEASE, STAGE II (MILD): ICD-10-CM

## 2025-05-05 DIAGNOSIS — E11.65 TYPE 2 DIABETES MELLITUS WITH HYPERGLYCEMIA, WITH LONG-TERM CURRENT USE OF INSULIN (HCC): ICD-10-CM

## 2025-05-05 DIAGNOSIS — Z79.4 TYPE 2 DIABETES MELLITUS WITH HYPERGLYCEMIA, WITH LONG-TERM CURRENT USE OF INSULIN (HCC): ICD-10-CM

## 2025-05-05 DIAGNOSIS — E83.52 HYPERCALCEMIA: ICD-10-CM

## 2025-05-05 DIAGNOSIS — N18.31 TYPE 2 DIABETES MELLITUS WITH STAGE 3A CHRONIC KIDNEY DISEASE, WITH LONG-TERM CURRENT USE OF INSULIN (HCC): ICD-10-CM

## 2025-05-05 PROCEDURE — 99214 OFFICE O/P EST MOD 30 MIN: CPT | Performed by: FAMILY MEDICINE

## 2025-05-05 PROCEDURE — G2211 COMPLEX E/M VISIT ADD ON: HCPCS | Performed by: FAMILY MEDICINE

## 2025-05-05 PROCEDURE — G0439 PPPS, SUBSEQ VISIT: HCPCS | Performed by: FAMILY MEDICINE

## 2025-05-05 RX ORDER — LANCETS 33 GAUGE
EACH MISCELLANEOUS
Qty: 100 EACH | Refills: 3 | Status: SHIPPED | OUTPATIENT
Start: 2025-05-05

## 2025-05-05 NOTE — ASSESSMENT & PLAN NOTE
Lab Results   Component Value Date    EGFR 64 10/22/2024    EGFR 69 04/09/2024    EGFR 54 10/03/2023    CREATININE 0.87 10/22/2024    CREATININE 0.82 04/09/2024    CREATININE 0.95 10/03/2023   Stable  Due for repeat renal panel

## 2025-05-05 NOTE — ASSESSMENT & PLAN NOTE
Lab Results   Component Value Date    CREATININE 0.87 10/22/2024       Well controlled  Bp is at goal <140/90  Continue atenolol 50mg bid, cardizem 120mg  Renal function is stable

## 2025-05-05 NOTE — PROGRESS NOTES
Diabetic Foot Exam    Patient's shoes and socks removed.    Right Foot/Ankle   Right Foot Inspection  Skin Exam: skin normal, skin intact, dry skin, callus and callus. No warmth, no erythema, no maceration, no abnormal color, no pre-ulcer and no ulcer.     Toe Exam: ROM and strength within normal limits.     Sensory   Monofilament testing: intact    Vascular  Capillary refills: < 3 seconds  The right DP pulse is 2+. The right PT pulse is 2+.     Left Foot/Ankle  Left Foot Inspection  Skin Exam: skin normal, skin intact, dry skin and callus. No warmth, no erythema, no maceration, normal color, no pre-ulcer and no ulcer.     Toe Exam: ROM and strength within normal limits.     Sensory   Monofilament testing: intact    Vascular  Capillary refills: < 3 seconds  The left DP pulse is 2+. The left PT pulse is 2+.     Assign Risk Category  No deformity present  No loss of protective sensation  No weak pulses  Risk: 0

## 2025-05-05 NOTE — PROGRESS NOTES
Assessment and Plan:     Problem List Items Addressed This Visit        Cardiovascular and Mediastinum    Benign essential hypertension    Lab Results   Component Value Date    CREATININE 0.87 10/22/2024       Well controlled  Bp is at goal <140/90  Continue atenolol 50mg bid, cardizem 120mg  Renal function is stable           Relevant Orders    Ambulatory Referral to Social Work Care Management Program    Paroxysmal atrial fibrillation (HCC)    Well controlled  Rate controlled on atenolol and cardizem  Anticoagulated on xarelto   Cardiologist is retiring  Patient can follow with cardiology prn         Relevant Orders    Ambulatory Referral to Social Work Care Management Program    Splenic artery aneurysm (HCC)    Under care of vascular surgery            Digestive    GERD without esophagitis    Well controlled on dietary modifications            Endocrine    Type 2 diabetes mellitus with stage 3a chronic kidney disease, with long-term current use of insulin ([facility])    Relevant Medications    OneTouch Delica Lancets 33G MISC    Type 2 diabetes mellitus with hyperglycemia ([facility])      Lab Results   Component Value Date    HGBA1C 7.4 (H) 01/31/2025     Well controlled  Fbs 120-155, average 130's  Tolerating jardiance 10mg  Continue lantus 10u  Denies episodes of hypoglycemia         Relevant Medications    OneTouch Delica Lancets 33G MISC    Other Relevant Orders    Hemoglobin A1C    Ambulatory Referral to Social Work Care Management Program       Genitourinary    Chronic kidney disease, stage II (mild)    Lab Results   Component Value Date    EGFR 64 10/22/2024    EGFR 69 04/09/2024    EGFR 54 10/03/2023    CREATININE 0.87 10/22/2024    CREATININE 0.82 04/09/2024    CREATININE 0.95 10/03/2023   Stable  Due for repeat renal panel         Relevant Orders    Comprehensive metabolic panel    Ambulatory Referral to Social Work Care Management Program    [place]-Walker grade 2 cystocele    Follows with urogynecology             Behavioral Health    Adjustment disorder with anxious mood    Well controlled  No longer needs atarax 25mg         Relevant Orders    Ambulatory Referral to Social Work Care Management Program       Other    Hypercalcemia    Hx of parathyroidectomy  Due for repeat pth and Ical  If abnormal will refer to endocrinology         Microproteinuria    Mixed hyperlipidemia    Relevant Orders    Lipid Panel with Direct LDL reflex    Vitamin D deficiency    Relevant Orders    Vitamin D 25 hydroxy   Other Visit Diagnoses       Medicare annual wellness visit, subsequent    -  Primary    Relevant Orders    Ambulatory Referral to Social Work Care Management Program      Microalbuminuria due to type 2 diabetes mellitus  (HCC)          Type 2 diabetes mellitus without complication, unspecified whether long term insulin use (HCC)                Depression Screening and Follow-up Plan: Patient was screened for depression during today's encounter. They screened negative with a PHQ-2 score of 0.      Urinary Incontinence Plan of Care: counseling topics discussed: practice Kegel (pelvic floor strengthening) exercises, use restroom every 2 hours, limit alcohol, caffeine, spicy foods, and acidic foods, keeping a bladder diary and weight loss.       Preventive health issues were discussed with patient, and age appropriate screening tests were ordered as noted in patient's After Visit Summary.  Personalized health advice and appropriate referrals for health education or preventive services given if needed, as noted in patient's After Visit Summary.     History of Present Illness:     Patient presents for a Medicare Wellness Visit and routine follow up    HPI   Patient Care Team:  Federico Estrada DO as PCP - General (Family Medicine)  Shyam Whitney MD as PCP - [facility]  Rohan Kim MD (Cardiology)     Review of Systems:     Review of Systems   Constitutional:  Negative for activity change,  chills, diaphoresis and fever.   HENT:  Negative for ear pain, hearing loss, postnasal drip, rhinorrhea, sinus pressure, sinus pain, sneezing and sore throat.    Respiratory:  Negative for cough, chest tightness, shortness of breath and wheezing.    Cardiovascular:  Negative for chest pain, palpitations and leg swelling.   Gastrointestinal:  Negative for abdominal pain, blood in stool, constipation, diarrhea, nausea and vomiting.   Genitourinary:  Negative for dysuria, frequency, hematuria and urgency.   Musculoskeletal:  Negative for arthralgias and myalgias.   Neurological:  Negative for dizziness, syncope, weakness, light-headedness, numbness and headaches.      Problem List:     Patient Active Problem List   Diagnosis   • Benign essential hypertension   • Prolapse of anterior vaginal wall   • Fibrocystic breast disease   • Hypercalcemia   • Incontinence   • Kyphosis   • Microproteinuria   • Tinnitus   • Paroxysmal atrial fibrillation (HCC)   • Mixed hyperlipidemia   • Type 2 diabetes mellitus with stage 3a chronic kidney disease, with long-term current use of insulin (HCC)   • Hyperparathyroidism (HCC)   • Splenic artery aneurysm (HCC)   • Thyroid nodule   • Vitamin D deficiency   • BMI 28.0-28.9,adult   • Osteopenia of multiple sites   • Chronic kidney disease, stage II (mild)   • Dysequilibrium   • Anxiety   • Hypoalbuminemia   • Adjustment disorder with anxious mood   • [place]-Walker grade 2 cystocele   • Type 2 diabetes mellitus with hyperglycemia (HCC)   • GERD without esophagitis   • Urinary urgency   • Other constipation   • Dysuria      Past Medical and Surgical History:     Past Medical History:   Diagnosis Date   • Anemia     IRON DEF ANEMIA DUE TO MENORRHAGIA   • Diabetes mellitus (HCC)    • Hypercalcemia 10/06/2010    MILD  DECREASES HCTZ   • Hypertension    • Melanoma (HCC) 11/2011    IN SITU  MID BACK   • Palpitations 05/28/2018    SINUS TACHYCARDIA  TROPONIN SPLL   • Skin cancer 2017    NOSE      Past Surgical History:   Procedure Laterality Date   • GALLBLADDER SURGERY  1991   • HYSTERECTOMY  1995    UTERINE PROLAPSE VAGINAL POLYPS   • HYSTERECTOMY      IRON REPLACEMENT   • OTHER SURGICAL HISTORY  11/2011    EXCISION MELANOMA IN SITU MID BACK   • PARATHYROIDECTOMY  05/21/2018      Family History:     Family History   Problem Relation Age of Onset   • Other Mother         TESTED(-)   • Colon cancer Father 60   • Prostate cancer Father 70   • Factor V Leiden deficiency Daughter    • Factor V Leiden deficiency Daughter    • Breast cancer Sister         in her 60's   • No Known Problems Maternal Grandmother    • No Known Problems Maternal Grandfather    • No Known Problems Paternal Grandmother    • No Known Problems Paternal Grandfather    • No Known Problems Sister    • No Known Problems Sister    • No Known Problems Maternal Aunt    • No Known Problems Maternal Aunt    • No Known Problems Maternal Aunt    • No Known Problems Maternal Aunt    • No Known Problems Maternal Aunt    • No Known Problems Maternal Aunt    • No Known Problems Maternal Aunt    • No Known Problems Paternal Aunt    • No Known Problems Paternal Aunt    • No Known Problems Paternal Aunt       Social History:     Social History     Socioeconomic History   • Marital status: /Civil Union     Spouse name: None   • Number of children: None   • Years of education: None   • Highest education level: None   Occupational History   • None   Tobacco Use   • Smoking status: Never     Passive exposure: Never   • Smokeless tobacco: Never   Vaping Use   • Vaping status: Never Used   Substance and Sexual Activity   • Alcohol use: No   • Drug use: No   • Sexual activity: Not Currently     Partners: Male   Other Topics Concern   • None   Social History Narrative   • None     Social Drivers of Health     Financial Resource Strain: Low Risk  (1/11/2023)    Overall Financial Resource Strain (CARDIA)    • Difficulty of Paying Living Expenses: Not hard  at all   Food Insecurity: No Food Insecurity (5/5/2025)    Hunger Vital Sign    • Worried About Running Out of Food in the Last Year: Never true    • Ran Out of Food in the Last Year: Never true   Transportation Needs: No Transportation Needs (5/5/2025)    PRAPARE - Transportation    • Lack of Transportation (Medical): No    • Lack of Transportation (Non-Medical): No   Physical Activity: Not on file   Stress: Not on file   Social Connections: Not on file   Intimate Partner Violence: Not on file   Housing Stability: Unknown (5/5/2025)    Housing Stability Vital Sign    • Unable to Pay for Housing in the Last Year: No    • Number of Times Moved in the Last Year: Not on file    • Homeless in the Last Year: No      Medications and Allergies:     Current Outpatient Medications   Medication Sig Dispense Refill   • OneTouch Delica Lancets 33G MISC Check blood sugars once daily. Please substitute with appropriate alternative as covered by patient's insurance. Dx: E11.65 100 each 3   • atenolol (TENORMIN) 50 mg tablet Take 1 tablet (50 mg total) by mouth 2 (two) times a day 180 tablet 3   • BD Pen Needle Glory U/F 32G X 4 MM MISC Inject as directed daily Test as directed (Patient not taking: Reported on 10/28/2024) 100 each 3   • Blood Glucose Monitoring Suppl (OneTouch Verio Reflect) w/Device KIT Check blood sugars once daily. Please substitute with appropriate alternative as covered by patient's insurance. Dx: E11.65 (Patient not taking: Reported on 10/28/2024) 1 kit 0   • diltiazem (CARDIZEM CD) 120 mg 24 hr capsule TAKE 1 CAPSULE BY MOUTH EVERY DAY 90 capsule 1   • Empagliflozin (JARDIANCE) 10 MG TABS tablet Take 1 tablet (10 mg total) by mouth daily 90 tablet 1   • estradiol (ESTRACE) 0.1 mg/g vaginal cream Insert 2 g into the vagina daily     • Fiber Adult Gummies 2 g CHEW Chew 2 each in the morning 120 tablet 1   • Insulin Glargine Solostar (Lantus SoloStar) 100 UNIT/ML SOPN Inject 0.1 mL (10 Units total) under the  skin daily at bedtime 9 mL 1   • Lancets (accu-chek multiclix) lancets Use daily Use as instructed 100 each 3   • lisinopril (ZESTRIL) 20 mg tablet Take 1 tablet (20 mg total) by mouth daily 90 tablet 1   • MICROLET LANCETS MISC by Does not apply route daily 50 each 3   • OneTouch Verio test strip CHECK BLOOD SUGARS ONCE DAILY. DX: E11.65 100 strip 1   • rivaroxaban (Xarelto) 20 mg tablet Take 1 tablet (20 mg total) by mouth daily with breakfast 90 tablet 0   • simvastatin (ZOCOR) 20 mg tablet Take 1 tablet (20 mg total) by mouth daily 90 tablet 1   • Vitamin D, Cholecalciferol, 50 MCG (2000 UT) CAPS Take 1 capsule by mouth daily       No current facility-administered medications for this visit.     Allergies   Allergen Reactions   • No Active Allergies       Immunizations:     Immunization History   Administered Date(s) Administered   • COVID-19 MODERNA VACC 0.5 ML IM 01/14/2021, 02/11/2021, 10/27/2021, 04/05/2022, 05/24/2022, 10/10/2022   • H1N1, All Formulations 12/22/2009   • INFLUENZA 10/12/2007, 10/16/2008, 10/15/2009, 09/23/2015, 11/16/2016, 11/16/2016, 10/04/2017   • Influenza Split 10/06/2010, 10/11/2011, 10/27/2012, 10/08/2013   • Influenza Split High Dose Preservative Free IM 09/25/2014, 10/04/2017, 10/28/2024   • Influenza, high dose seasonal 0.7 mL 10/15/2018, 10/22/2019, 09/14/2020, 10/18/2021, 10/03/2022, 10/09/2023   • Influenza, seasonal, injectable 10/16/2008, 10/15/2009, 09/23/2015   • Pneumococcal Conjugate 13-Valent 03/30/2015   • Pneumococcal Polysaccharide PPV23 12/05/1997, 10/06/2010   • Td (adult), adsorbed 04/12/1984, 07/18/2011   • Tdap 03/10/2022   • Zoster 04/08/2009, 04/08/2009   • Zoster Vaccine Recombinant 06/25/2019, 09/30/2019      Health Maintenance:         Topic Date Due   • DXA SCAN  03/27/2025         Topic Date Due   • COVID-19 Vaccine (7 - 2024-25 season) 09/01/2024      Medicare Screening Tests and Risk Assessments:     Jenny is here for her Subsequent Wellness visit. Last  Medicare Wellness visit information reviewed, patient interviewed and updates made to the record today.      Health Risk Assessment:   Patient rates overall health as good. Patient feels that their physical health rating is slightly worse. Patient is satisfied with their life. Eyesight was rated as slightly worse. Hearing was rated as slightly worse. Patient feels that their emotional and mental health rating is slightly worse. Patients states they are sometimes angry. Patient states they are often unusually tired/fatigued. Pain experienced in the last 7 days has been some. Patient's pain rating has been 3/10. Patient states that she has experienced no weight loss or gain in last 6 months.     Depression Screening:   PHQ-2 Score: 0      Fall Risk Screening:   In the past year, patient has experienced: no history of falling in past year      Urinary Incontinence Screening:   Patient has leaked urine accidently in the last six months.     Home Safety:  Patient does not have trouble with stairs inside or outside of their home. Patient has working smoke alarms and has working carbon monoxide detector. Home safety hazards include: none.     Nutrition:   Current diet is Regular and Low Carb. LOW CARB DIET     Medications:   Patient is currently taking over-the-counter supplements. OTC medications include: see medication list. Patient is able to manage medications.     Activities of Daily Living (ADLs)/Instrumental Activities of Daily Living (IADLs):   Walk and transfer into and out of bed and chair?: Yes  Dress and groom yourself?: Yes    Bathe or shower yourself?: Yes    Feed yourself? Yes  Do your laundry/housekeeping?: Yes  Manage your money, pay your bills and track your expenses?: Yes  Make your own meals?: Yes    Do your own shopping?: Yes    Previous Hospitalizations:   Any hospitalizations or ED visits within the last 12 months?: No      Advance Care Planning:   Living will: Yes    Durable POA for healthcare: Yes   "  Advanced directive: Yes    Advanced directive counseling given: Yes    End of Life Decisions reviewed with patient: Yes    Provider agrees with end of life decisions: Yes      Cognitive Screening:   Provider or family/friend/caregiver concerned regarding cognition?: No    Preventive Screenings      Cardiovascular Screening:    General: Screening Not Indicated and History Lipid Disorder      Diabetes Screening:     General: Screening Not Indicated and History Diabetes      Colorectal Cancer Screening:     General: Screening Not Indicated      Breast Cancer Screening:     General: Screening Not Indicated      Cervical Cancer Screening:    General: Screening Not Indicated      Osteoporosis Screening:    General: Screening Current      Abdominal Aortic Aneurysm (AAA) Screening:        General: Screening Not Indicated      Lung Cancer Screening:     General: Screening Not Indicated      Hepatitis C Screening:    General: Screening Not Indicated    Screening, Brief Intervention, and Referral to Treatment (SBIRT)     Screening      Single Item Drug Screening:  How often have you used an illegal drug (including marijuana) or a prescription medication for non-medical reasons in the past year? never    Single Item Drug Screen Score: 0  Interpretation: Negative screen for possible drug use disorder    Other Counseling Topics:   Car/seat belt/driving safety, skin self-exam, sunscreen and calcium and vitamin D intake and regular weightbearing exercise.     No results found.     Physical Exam:     /80 (BP Location: Left arm, Patient Position: Sitting, Cuff Size: Adult)   Pulse 69   Temp 98.7 °F (37.1 °C) (Temporal)   Ht 5' 2.7\" (1.593 m)   Wt 62.8 kg (138 lb 6.4 oz)   SpO2 97%   BMI 24.75 kg/m²     Physical Exam  Constitutional:       General: She is not in acute distress.     Appearance: Normal appearance. She is well-developed. She is not diaphoretic.   HENT:      Head: Normocephalic and atraumatic.      Right " Ear: Tympanic membrane, ear canal and external ear normal. There is no impacted cerumen.      Left Ear: Tympanic membrane, ear canal and external ear normal. There is no impacted cerumen.      Nose: Nose normal. No congestion or rhinorrhea.      Mouth/Throat:      Mouth: Mucous membranes are moist.      Pharynx: Oropharynx is clear. No oropharyngeal exudate.   Eyes:      Conjunctiva/sclera: Conjunctivae normal.      Pupils: Pupils are equal, round, and reactive to light.   Neck:      Vascular: No JVD.   Cardiovascular:      Rate and Rhythm: Normal rate and regular rhythm.      Heart sounds: Normal heart sounds. No murmur heard.     No friction rub. No gallop.   Pulmonary:      Effort: Pulmonary effort is normal. No respiratory distress.      Breath sounds: Normal breath sounds. No wheezing or rales.   Chest:      Chest wall: No tenderness.   Abdominal:      General: Bowel sounds are normal. There is no distension.      Palpations: Abdomen is soft.      Tenderness: There is no abdominal tenderness. There is no right CVA tenderness, left CVA tenderness, guarding or rebound.   Musculoskeletal:         General: No tenderness. Normal range of motion.      Cervical back: No tenderness.   Lymphadenopathy:      Cervical: No cervical adenopathy.   Skin:     General: Skin is warm and dry.   Neurological:      Mental Status: She is alert and oriented to person, place, and time.      Cranial Nerves: No cranial nerve deficit.   Psychiatric:         Mood and Affect: Mood and affect normal.         Behavior: Behavior normal.          Federico Estrada DO

## 2025-05-05 NOTE — ASSESSMENT & PLAN NOTE
Well controlled  Rate controlled on atenolol and cardizem  Anticoagulated on xarelto   Cardiologist is retiring  Patient can follow with cardiology prn

## 2025-05-05 NOTE — ASSESSMENT & PLAN NOTE
Lab Results   Component Value Date    HGBA1C 7.4 (H) 01/31/2025     Well controlled  Fbs 120-155, average 130's  Tolerating jardiance 10mg  Continue lantus 10u  Denies episodes of hypoglycemia

## 2025-05-06 RX ORDER — PEN NEEDLE, DIABETIC 32GX 5/32"
NEEDLE, DISPOSABLE MISCELLANEOUS
Qty: 100 EACH | Refills: 1 | Status: SHIPPED | OUTPATIENT
Start: 2025-05-06

## 2025-05-07 ENCOUNTER — PATIENT OUTREACH (OUTPATIENT)
Dept: CASE MANAGEMENT | Facility: OTHER | Age: 89
End: 2025-05-07
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
## 2025-05-16 ENCOUNTER — TELEPHONE (OUTPATIENT)
Age: 89
End: 2025-05-16

## 2025-05-16 NOTE — TELEPHONE ENCOUNTER
Patient stated she has he blood work done at  [facility]  5/1/25. Please call [facility] on [address] to have them release her blood work results.

## 2025-05-19 DIAGNOSIS — E78.49 OTHER HYPERLIPIDEMIA: ICD-10-CM

## 2025-05-19 DIAGNOSIS — I10 BENIGN ESSENTIAL HYPERTENSION: ICD-10-CM

## 2025-05-20 RX ORDER — SIMVASTATIN 20 MG
20 TABLET ORAL DAILY
Qty: 90 TABLET | Refills: 1 | Status: SHIPPED | OUTPATIENT
Start: 2025-05-20

## 2025-05-20 RX ORDER — ATENOLOL 50 MG/1
50 TABLET ORAL 2 TIMES DAILY
Qty: 180 TABLET | Refills: 1 | Status: SHIPPED | OUTPATIENT
Start: 2025-05-20

## 2025-05-21 ENCOUNTER — RESULTS FOLLOW-UP (OUTPATIENT)
Dept: FAMILY MEDICINE CLINIC | Facility: CLINIC | Age: 89
End: 2025-05-21

## 2025-06-20 ENCOUNTER — TELEPHONE (OUTPATIENT)
Dept: CARDIOLOGY CLINIC | Facility: CLINIC | Age: 89
End: 2025-06-20

## 2025-08-01 DIAGNOSIS — I48.91 ATRIAL FIBRILLATION, UNSPECIFIED TYPE (HCC): ICD-10-CM

## 2025-08-01 DIAGNOSIS — R80.9 MICROALBUMINURIA DUE TO TYPE 2 DIABETES MELLITUS  (HCC): ICD-10-CM

## 2025-08-01 DIAGNOSIS — E11.65 TYPE 2 DIABETES MELLITUS WITH HYPERGLYCEMIA, WITH LONG-TERM CURRENT USE OF INSULIN (HCC): ICD-10-CM

## 2025-08-01 DIAGNOSIS — Z79.4 TYPE 2 DIABETES MELLITUS WITH HYPERGLYCEMIA, WITH LONG-TERM CURRENT USE OF INSULIN (HCC): ICD-10-CM

## 2025-08-01 DIAGNOSIS — E11.29 MICROALBUMINURIA DUE TO TYPE 2 DIABETES MELLITUS  (HCC): ICD-10-CM
